# Patient Record
Sex: MALE | Race: WHITE | NOT HISPANIC OR LATINO | Employment: UNEMPLOYED | URBAN - METROPOLITAN AREA
[De-identification: names, ages, dates, MRNs, and addresses within clinical notes are randomized per-mention and may not be internally consistent; named-entity substitution may affect disease eponyms.]

---

## 2014-07-25 LAB — CRC RECOMMENDATION EXT: NORMAL

## 2017-01-17 ENCOUNTER — HISTORICAL (OUTPATIENT)
Dept: LAB | Facility: HOSPITAL | Age: 62
End: 2017-01-17

## 2017-01-18 ENCOUNTER — HISTORICAL (OUTPATIENT)
Dept: ADMINISTRATIVE | Facility: HOSPITAL | Age: 62
End: 2017-01-18

## 2017-01-24 ENCOUNTER — HISTORICAL (OUTPATIENT)
Dept: CARDIOLOGY | Facility: CLINIC | Age: 62
End: 2017-01-24

## 2017-01-30 ENCOUNTER — HISTORICAL (OUTPATIENT)
Dept: RADIOLOGY | Facility: HOSPITAL | Age: 62
End: 2017-01-30

## 2017-03-04 ENCOUNTER — HISTORICAL (OUTPATIENT)
Dept: SLEEP MEDICINE | Facility: HOSPITAL | Age: 62
End: 2017-03-04

## 2017-03-31 ENCOUNTER — HISTORICAL (OUTPATIENT)
Dept: SLEEP MEDICINE | Facility: HOSPITAL | Age: 62
End: 2017-03-31

## 2017-04-06 ENCOUNTER — HISTORICAL (OUTPATIENT)
Dept: ADMINISTRATIVE | Facility: HOSPITAL | Age: 62
End: 2017-04-06

## 2017-10-16 ENCOUNTER — HISTORICAL (OUTPATIENT)
Dept: CARDIOLOGY | Facility: CLINIC | Age: 62
End: 2017-10-16

## 2017-10-16 LAB
ABS NEUT (OLG): 3.31 X10(3)/MCL (ref 2.1–9.2)
ALBUMIN SERPL-MCNC: 3.4 GM/DL (ref 3.4–5)
ALBUMIN/GLOB SERPL: 1 RATIO (ref 1–2)
ALP SERPL-CCNC: 73 UNIT/L (ref 45–117)
ALT SERPL-CCNC: 36 UNIT/L (ref 12–78)
AST SERPL-CCNC: 21 UNIT/L (ref 15–37)
BASOPHILS # BLD AUTO: 0.04 X10(3)/MCL
BASOPHILS NFR BLD AUTO: 1 % (ref 0–1)
BILIRUB SERPL-MCNC: 0.7 MG/DL (ref 0.2–1)
BILIRUBIN DIRECT+TOT PNL SERPL-MCNC: 0.2 MG/DL
BILIRUBIN DIRECT+TOT PNL SERPL-MCNC: 0.5 MG/DL
BUN SERPL-MCNC: 25 MG/DL (ref 7–18)
CALCIUM SERPL-MCNC: 8.9 MG/DL (ref 8.5–10.1)
CHLORIDE SERPL-SCNC: 108 MMOL/L (ref 98–107)
CHOLEST SERPL-MCNC: 117 MG/DL
CHOLEST/HDLC SERPL: 4 {RATIO} (ref 0–5)
CO2 SERPL-SCNC: 30 MMOL/L (ref 21–32)
CREAT SERPL-MCNC: 1.2 MG/DL (ref 0.6–1.3)
EOSINOPHIL # BLD AUTO: 0.2 X10(3)/MCL
EOSINOPHIL NFR BLD AUTO: 3 % (ref 0–5)
ERYTHROCYTE [DISTWIDTH] IN BLOOD BY AUTOMATED COUNT: 12.9 % (ref 11.5–14.5)
GLOBULIN SER-MCNC: 3.5 GM/ML (ref 2.3–3.5)
GLUCOSE SERPL-MCNC: 105 MG/DL (ref 74–106)
HCT VFR BLD AUTO: 45.1 % (ref 40–51)
HDLC SERPL-MCNC: 29 MG/DL
HGB BLD-MCNC: 15.8 GM/DL (ref 13.5–17.5)
IMM GRANULOCYTES # BLD AUTO: 0.03 10*3/UL
IMM GRANULOCYTES NFR BLD AUTO: 0 %
LDLC SERPL CALC-MCNC: 43 MG/DL (ref 0–130)
LYMPHOCYTES # BLD AUTO: 2.03 X10(3)/MCL
LYMPHOCYTES NFR BLD AUTO: 32 % (ref 15–40)
MCH RBC QN AUTO: 31 PG (ref 26–34)
MCHC RBC AUTO-ENTMCNC: 35 GM/DL (ref 31–37)
MCV RBC AUTO: 88.4 FL (ref 80–100)
MONOCYTES # BLD AUTO: 0.71 X10(3)/MCL
MONOCYTES NFR BLD AUTO: 11 % (ref 4–12)
NEUTROPHILS # BLD AUTO: 3.31 X10(3)/MCL
NEUTROPHILS NFR BLD AUTO: 52 X10(3)/MCL
PLATELET # BLD AUTO: 271 X10(3)/MCL (ref 130–400)
PMV BLD AUTO: 9.6 FL (ref 7.4–10.4)
POTASSIUM SERPL-SCNC: 4.2 MMOL/L (ref 3.5–5.1)
PROT SERPL-MCNC: 6.9 GM/DL (ref 6.4–8.2)
RBC # BLD AUTO: 5.1 X10(6)/MCL (ref 4.5–5.9)
SODIUM SERPL-SCNC: 140 MMOL/L (ref 136–145)
T4 FREE SERPL-MCNC: 1.22 NG/DL (ref 0.76–1.46)
TRIGL SERPL-MCNC: 224 MG/DL
TSH SERPL-ACNC: 1.9 MIU/L (ref 0.36–3.74)
VLDLC SERPL CALC-MCNC: 45 MG/DL
WBC # SPEC AUTO: 6.3 X10(3)/MCL (ref 4.5–11)

## 2017-11-14 ENCOUNTER — HISTORICAL (OUTPATIENT)
Dept: ADMINISTRATIVE | Facility: HOSPITAL | Age: 62
End: 2017-11-14

## 2017-11-14 LAB
BUN SERPL-MCNC: 17 MG/DL (ref 7–18)
CALCIUM SERPL-MCNC: 9.7 MG/DL (ref 8.5–10.1)
CHLORIDE SERPL-SCNC: 104 MMOL/L (ref 98–107)
CO2 SERPL-SCNC: 34 MMOL/L (ref 21–32)
CREAT SERPL-MCNC: 1.1 MG/DL (ref 0.6–1.3)
ERYTHROCYTE [DISTWIDTH] IN BLOOD BY AUTOMATED COUNT: 12.7 % (ref 11.5–14.5)
GLUCOSE SERPL-MCNC: 79 MG/DL (ref 74–106)
HCT VFR BLD AUTO: 48.2 % (ref 40–51)
HGB BLD-MCNC: 16.9 GM/DL (ref 13.5–17.5)
MCH RBC QN AUTO: 31 PG (ref 26–34)
MCHC RBC AUTO-ENTMCNC: 35.1 GM/DL (ref 31–37)
MCV RBC AUTO: 88.3 FL (ref 80–100)
PLATELET # BLD AUTO: 322 X10(3)/MCL (ref 130–400)
PMV BLD AUTO: 9.7 FL (ref 7.4–10.4)
POTASSIUM SERPL-SCNC: 4.5 MMOL/L (ref 3.5–5.1)
RBC # BLD AUTO: 5.46 X10(6)/MCL (ref 4.5–5.9)
SODIUM SERPL-SCNC: 143 MMOL/L (ref 136–145)
WBC # SPEC AUTO: 7.4 X10(3)/MCL (ref 4.5–11)

## 2017-11-17 ENCOUNTER — HISTORICAL (OUTPATIENT)
Dept: SURGERY | Facility: HOSPITAL | Age: 62
End: 2017-11-17

## 2017-11-17 LAB — POTASSIUM SERPL-SCNC: 3.8 MMOL/L (ref 3.5–5.1)

## 2018-01-22 ENCOUNTER — HISTORICAL (OUTPATIENT)
Dept: ADMINISTRATIVE | Facility: HOSPITAL | Age: 63
End: 2018-01-22

## 2018-01-22 LAB
APPEARANCE, UA: CLEAR
BACTERIA #/AREA URNS AUTO: ABNORMAL /[HPF]
BILIRUB UR QL STRIP: NEGATIVE
COLOR UR: ABNORMAL
GLUCOSE (UA): NORMAL
HGB UR QL STRIP: NEGATIVE
HYALINE CASTS #/AREA URNS LPF: ABNORMAL /[LPF]
KETONES UR QL STRIP: NEGATIVE
LEUKOCYTE ESTERASE UR QL STRIP: NEGATIVE
NITRITE UR QL STRIP: NEGATIVE
PH UR STRIP: 5.5 [PH] (ref 4.5–8)
PROT UR QL STRIP: NEGATIVE
PSA SERPL-MCNC: 0.9 NG/ML
RBC #/AREA URNS AUTO: ABNORMAL /[HPF]
SP GR UR STRIP: 1 (ref 1–1.03)
SQUAMOUS #/AREA URNS LPF: <1 /[LPF]
UROBILINOGEN UR STRIP-ACNC: NORMAL
WBC #/AREA URNS AUTO: ABNORMAL /HPF

## 2018-01-24 LAB — FINAL CULTURE: NO GROWTH

## 2018-02-21 ENCOUNTER — HISTORICAL (OUTPATIENT)
Dept: SURGERY | Facility: HOSPITAL | Age: 63
End: 2018-02-21

## 2018-02-21 LAB
BUN SERPL-MCNC: 16 MG/DL (ref 7–18)
CALCIUM SERPL-MCNC: 8.7 MG/DL (ref 8.5–10.1)
CHLORIDE SERPL-SCNC: 105 MMOL/L (ref 98–107)
CO2 SERPL-SCNC: 30 MMOL/L (ref 21–32)
CREAT SERPL-MCNC: 1.2 MG/DL (ref 0.6–1.3)
ERYTHROCYTE [DISTWIDTH] IN BLOOD BY AUTOMATED COUNT: 12.9 % (ref 11.5–14.5)
GLUCOSE SERPL-MCNC: 109 MG/DL (ref 74–106)
HCT VFR BLD AUTO: 44.9 % (ref 40–51)
HGB BLD-MCNC: 15.8 GM/DL (ref 13.5–17.5)
MCH RBC QN AUTO: 31 PG (ref 26–34)
MCHC RBC AUTO-ENTMCNC: 35.2 GM/DL (ref 31–37)
MCV RBC AUTO: 88 FL (ref 80–100)
PLATELET # BLD AUTO: 303 X10(3)/MCL (ref 130–400)
PMV BLD AUTO: 9.2 FL (ref 7.4–10.4)
POTASSIUM SERPL-SCNC: 3.9 MMOL/L (ref 3.5–5.1)
RBC # BLD AUTO: 5.1 X10(6)/MCL (ref 4.5–5.9)
SODIUM SERPL-SCNC: 141 MMOL/L (ref 136–145)
WBC # SPEC AUTO: 7.6 X10(3)/MCL (ref 4.5–11)

## 2018-02-26 ENCOUNTER — HISTORICAL (OUTPATIENT)
Dept: SURGERY | Facility: HOSPITAL | Age: 63
End: 2018-02-26

## 2018-02-26 LAB
BUN SERPL-MCNC: 15 MG/DL (ref 7–18)
CALCIUM SERPL-MCNC: 9.4 MG/DL (ref 8.5–10.1)
CHLORIDE SERPL-SCNC: 105 MMOL/L (ref 98–107)
CO2 SERPL-SCNC: 29 MMOL/L (ref 21–32)
CREAT SERPL-MCNC: 1.1 MG/DL (ref 0.6–1.3)
ERYTHROCYTE [DISTWIDTH] IN BLOOD BY AUTOMATED COUNT: 13.1 % (ref 11.5–14.5)
GLUCOSE SERPL-MCNC: 96 MG/DL (ref 74–106)
HCT VFR BLD AUTO: 45.4 % (ref 40–51)
HGB BLD-MCNC: 15.9 GM/DL (ref 13.5–17.5)
MCH RBC QN AUTO: 30.6 PG (ref 26–34)
MCHC RBC AUTO-ENTMCNC: 35 GM/DL (ref 31–37)
MCV RBC AUTO: 87.3 FL (ref 80–100)
PLATELET # BLD AUTO: 314 X10(3)/MCL (ref 130–400)
PMV BLD AUTO: 9.2 FL (ref 7.4–10.4)
POTASSIUM SERPL-SCNC: 3.9 MMOL/L (ref 3.5–5.1)
RBC # BLD AUTO: 5.2 X10(6)/MCL (ref 4.5–5.9)
SODIUM SERPL-SCNC: 141 MMOL/L (ref 136–145)
WBC # SPEC AUTO: 8.1 X10(3)/MCL (ref 4.5–11)

## 2018-03-27 ENCOUNTER — HISTORICAL (OUTPATIENT)
Dept: ADMINISTRATIVE | Facility: HOSPITAL | Age: 63
End: 2018-03-27

## 2018-03-28 ENCOUNTER — HISTORICAL (OUTPATIENT)
Dept: INTERNAL MEDICINE | Facility: CLINIC | Age: 63
End: 2018-03-28

## 2018-03-28 LAB
CHOLEST SERPL-MCNC: 80 MG/DL
CHOLEST/HDLC SERPL: 3.2 {RATIO} (ref 0–5)
ERYTHROCYTE [DISTWIDTH] IN BLOOD BY AUTOMATED COUNT: 12.7 % (ref 11.5–14.5)
HCT VFR BLD AUTO: 45.2 % (ref 40–51)
HDLC SERPL-MCNC: 25 MG/DL
HGB BLD-MCNC: 15.7 GM/DL (ref 13.5–17.5)
LDLC SERPL CALC-MCNC: 13 MG/DL (ref 0–130)
MCH RBC QN AUTO: 31.2 PG (ref 26–34)
MCHC RBC AUTO-ENTMCNC: 34.7 GM/DL (ref 31–37)
MCV RBC AUTO: 89.7 FL (ref 80–100)
PLATELET # BLD AUTO: 334 X10(3)/MCL (ref 130–400)
PMV BLD AUTO: 9.5 FL (ref 7.4–10.4)
RBC # BLD AUTO: 5.04 X10(6)/MCL (ref 4.5–5.9)
TRIGL SERPL-MCNC: 209 MG/DL
VLDLC SERPL CALC-MCNC: 42 MG/DL
WBC # SPEC AUTO: 7.6 X10(3)/MCL (ref 4.5–11)

## 2018-04-03 ENCOUNTER — HISTORICAL (OUTPATIENT)
Dept: SURGERY | Facility: HOSPITAL | Age: 63
End: 2018-04-03

## 2018-04-03 LAB — POTASSIUM SERPL-SCNC: 3.6 MMOL/L (ref 3.5–5.1)

## 2018-05-24 ENCOUNTER — HISTORICAL (OUTPATIENT)
Dept: ADMINISTRATIVE | Facility: HOSPITAL | Age: 63
End: 2018-05-24

## 2018-05-24 LAB
BUN SERPL-MCNC: 22 MG/DL (ref 7–18)
CALCIUM SERPL-MCNC: 9.1 MG/DL (ref 8.5–10.1)
CHLORIDE SERPL-SCNC: 102 MMOL/L (ref 98–107)
CO2 SERPL-SCNC: 30 MMOL/L (ref 21–32)
CREAT SERPL-MCNC: 1.2 MG/DL (ref 0.6–1.3)
CREAT/UREA NIT SERPL: 18
ERYTHROCYTE [DISTWIDTH] IN BLOOD BY AUTOMATED COUNT: 12.2 % (ref 11.5–14.5)
GLUCOSE SERPL-MCNC: 67 MG/DL (ref 74–106)
HCT VFR BLD AUTO: 49.7 % (ref 40–51)
HGB BLD-MCNC: 16.8 GM/DL (ref 13.5–17.5)
MCH RBC QN AUTO: 30.3 PG (ref 26–34)
MCHC RBC AUTO-ENTMCNC: 33.8 GM/DL (ref 31–37)
MCV RBC AUTO: 89.7 FL (ref 80–100)
PLATELET # BLD AUTO: 281 X10(3)/MCL (ref 130–400)
PMV BLD AUTO: 9.6 FL (ref 7.4–10.4)
POTASSIUM SERPL-SCNC: 3.7 MMOL/L (ref 3.5–5.1)
RBC # BLD AUTO: 5.54 X10(6)/MCL (ref 4.5–5.9)
SODIUM SERPL-SCNC: 140 MMOL/L (ref 136–145)
WBC # SPEC AUTO: 6.2 X10(3)/MCL (ref 4.5–11)

## 2018-05-29 ENCOUNTER — HISTORICAL (OUTPATIENT)
Dept: SURGERY | Facility: HOSPITAL | Age: 63
End: 2018-05-29

## 2018-05-29 LAB — POTASSIUM SERPL-SCNC: 4.5 MMOL/L (ref 3.5–5.1)

## 2018-07-27 ENCOUNTER — HISTORICAL (OUTPATIENT)
Dept: CARDIOLOGY | Facility: CLINIC | Age: 63
End: 2018-07-27

## 2018-07-27 LAB
ABS NEUT (OLG): 3.76 X10(3)/MCL (ref 2.1–9.2)
ALBUMIN SERPL-MCNC: 3.6 GM/DL (ref 3.4–5)
ALBUMIN/GLOB SERPL: 1 RATIO (ref 1–2)
ALP SERPL-CCNC: 81 UNIT/L (ref 45–117)
ALT SERPL-CCNC: 30 UNIT/L (ref 12–78)
AST SERPL-CCNC: 21 UNIT/L (ref 15–37)
BASOPHILS # BLD AUTO: 0.06 X10(3)/MCL
BASOPHILS NFR BLD AUTO: 1 %
BILIRUB SERPL-MCNC: 0.8 MG/DL (ref 0.2–1)
BILIRUBIN DIRECT+TOT PNL SERPL-MCNC: 0.2 MG/DL
BILIRUBIN DIRECT+TOT PNL SERPL-MCNC: 0.6 MG/DL
BUN SERPL-MCNC: 16 MG/DL (ref 7–18)
CALCIUM SERPL-MCNC: 9 MG/DL (ref 8.5–10.1)
CHLORIDE SERPL-SCNC: 102 MMOL/L (ref 98–107)
CHOLEST SERPL-MCNC: 86 MG/DL
CHOLEST/HDLC SERPL: 3.1 {RATIO} (ref 0–5)
CO2 SERPL-SCNC: 32 MMOL/L (ref 21–32)
CREAT SERPL-MCNC: 1.4 MG/DL (ref 0.6–1.3)
EOSINOPHIL # BLD AUTO: 0.28 X10(3)/MCL
EOSINOPHIL NFR BLD AUTO: 4 %
ERYTHROCYTE [DISTWIDTH] IN BLOOD BY AUTOMATED COUNT: 13.2 % (ref 11.5–14.5)
GLOBULIN SER-MCNC: 3.8 GM/ML (ref 2.3–3.5)
GLUCOSE SERPL-MCNC: 107 MG/DL (ref 74–106)
HCT VFR BLD AUTO: 48.4 % (ref 40–51)
HDLC SERPL-MCNC: 28 MG/DL
HGB BLD-MCNC: 16.7 GM/DL (ref 13.5–17.5)
IMM GRANULOCYTES # BLD AUTO: 0.01 10*3/UL
IMM GRANULOCYTES NFR BLD AUTO: 0 %
LDLC SERPL CALC-MCNC: 29 MG/DL (ref 0–130)
LYMPHOCYTES # BLD AUTO: 2.12 X10(3)/MCL
LYMPHOCYTES NFR BLD AUTO: 31 % (ref 13–40)
MCH RBC QN AUTO: 30.3 PG (ref 26–34)
MCHC RBC AUTO-ENTMCNC: 34.5 GM/DL (ref 31–37)
MCV RBC AUTO: 87.7 FL (ref 80–100)
MONOCYTES # BLD AUTO: 0.64 X10(3)/MCL
MONOCYTES NFR BLD AUTO: 9 % (ref 4–12)
NEUTROPHILS # BLD AUTO: 3.76 X10(3)/MCL
NEUTROPHILS NFR BLD AUTO: 55 X10(3)/MCL
PLATELET # BLD AUTO: 303 X10(3)/MCL (ref 130–400)
PMV BLD AUTO: 9.1 FL (ref 7.4–10.4)
POTASSIUM SERPL-SCNC: 4.3 MMOL/L (ref 3.5–5.1)
PROT SERPL-MCNC: 7.4 GM/DL (ref 6.4–8.2)
RBC # BLD AUTO: 5.52 X10(6)/MCL (ref 4.5–5.9)
SODIUM SERPL-SCNC: 138 MMOL/L (ref 136–145)
TRIGL SERPL-MCNC: 146 MG/DL
VLDLC SERPL CALC-MCNC: 29 MG/DL
WBC # SPEC AUTO: 6.9 X10(3)/MCL (ref 4.5–11)

## 2018-09-26 ENCOUNTER — HISTORICAL (OUTPATIENT)
Dept: CARDIOLOGY | Facility: CLINIC | Age: 63
End: 2018-09-26

## 2018-09-26 LAB
ALBUMIN SERPL-MCNC: 3.3 GM/DL (ref 3.4–5)
ALBUMIN/GLOB SERPL: 1 RATIO (ref 1–2)
ALP SERPL-CCNC: 82 UNIT/L (ref 45–117)
ALT SERPL-CCNC: 28 UNIT/L (ref 12–78)
AST SERPL-CCNC: 21 UNIT/L (ref 15–37)
BILIRUB SERPL-MCNC: 0.5 MG/DL (ref 0.2–1)
BILIRUBIN DIRECT+TOT PNL SERPL-MCNC: 0.1 MG/DL
BILIRUBIN DIRECT+TOT PNL SERPL-MCNC: 0.4 MG/DL
BUN SERPL-MCNC: 14 MG/DL (ref 7–18)
CALCIUM SERPL-MCNC: 8.6 MG/DL (ref 8.5–10.1)
CHLORIDE SERPL-SCNC: 101 MMOL/L (ref 98–107)
CHOLEST SERPL-MCNC: 89 MG/DL
CHOLEST/HDLC SERPL: 3.2 {RATIO} (ref 0–5)
CO2 SERPL-SCNC: 32 MMOL/L (ref 21–32)
CREAT SERPL-MCNC: 1.2 MG/DL (ref 0.6–1.3)
GLOBULIN SER-MCNC: 3.9 GM/ML (ref 2.3–3.5)
GLUCOSE SERPL-MCNC: 102 MG/DL (ref 74–106)
HDLC SERPL-MCNC: 28 MG/DL
LDLC SERPL CALC-MCNC: 8 MG/DL (ref 0–130)
POTASSIUM SERPL-SCNC: 4 MMOL/L (ref 3.5–5.1)
PROT SERPL-MCNC: 7.2 GM/DL (ref 6.4–8.2)
SODIUM SERPL-SCNC: 137 MMOL/L (ref 136–145)
TRIGL SERPL-MCNC: 267 MG/DL
VLDLC SERPL CALC-MCNC: 53 MG/DL

## 2019-02-06 ENCOUNTER — HISTORICAL (OUTPATIENT)
Dept: ADMINISTRATIVE | Facility: HOSPITAL | Age: 64
End: 2019-02-06

## 2019-02-06 LAB
BUN SERPL-MCNC: 19 MG/DL (ref 7–18)
CALCIUM SERPL-MCNC: 9.2 MG/DL (ref 8.5–10.1)
CHLORIDE SERPL-SCNC: 104 MMOL/L (ref 98–107)
CO2 SERPL-SCNC: 29 MMOL/L (ref 21–32)
CREAT SERPL-MCNC: 1 MG/DL (ref 0.6–1.3)
CREAT/UREA NIT SERPL: 19
GLUCOSE SERPL-MCNC: 94 MG/DL (ref 74–106)
POTASSIUM SERPL-SCNC: 4.4 MMOL/L (ref 3.5–5.1)
SODIUM SERPL-SCNC: 138 MMOL/L (ref 136–145)

## 2019-02-21 ENCOUNTER — HISTORICAL (OUTPATIENT)
Dept: RADIOLOGY | Facility: HOSPITAL | Age: 64
End: 2019-02-21

## 2019-03-25 ENCOUNTER — HISTORICAL (OUTPATIENT)
Dept: CARDIOLOGY | Facility: CLINIC | Age: 64
End: 2019-03-25

## 2019-03-25 LAB
ALBUMIN SERPL-MCNC: 3.7 GM/DL (ref 3.4–5)
ALBUMIN/GLOB SERPL: 1 RATIO (ref 1.1–2)
ALP SERPL-CCNC: 82 UNIT/L (ref 45–117)
ALT SERPL-CCNC: 43 UNIT/L (ref 12–78)
AST SERPL-CCNC: 42 UNIT/L (ref 15–37)
BILIRUB SERPL-MCNC: 0.6 MG/DL (ref 0.2–1)
BILIRUBIN DIRECT+TOT PNL SERPL-MCNC: 0.2 MG/DL
BILIRUBIN DIRECT+TOT PNL SERPL-MCNC: 0.4 MG/DL
BUN SERPL-MCNC: 14 MG/DL (ref 7–18)
CALCIUM SERPL-MCNC: 9 MG/DL (ref 8.5–10.1)
CHLORIDE SERPL-SCNC: 110 MMOL/L (ref 98–107)
CHOLEST SERPL-MCNC: 92 MG/DL
CHOLEST/HDLC SERPL: 3.1 {RATIO} (ref 0–5)
CO2 SERPL-SCNC: 27 MMOL/L (ref 21–32)
CREAT SERPL-MCNC: 1.1 MG/DL (ref 0.6–1.3)
GLOBULIN SER-MCNC: 3.7 GM/ML (ref 2.3–3.5)
GLUCOSE SERPL-MCNC: 111 MG/DL (ref 74–106)
HDLC SERPL-MCNC: 30 MG/DL
LDLC SERPL CALC-MCNC: 29 MG/DL (ref 0–130)
POTASSIUM SERPL-SCNC: 4.1 MMOL/L (ref 3.5–5.1)
PROT SERPL-MCNC: 7.4 GM/DL (ref 6.4–8.2)
SODIUM SERPL-SCNC: 142 MMOL/L (ref 136–145)
TRIGL SERPL-MCNC: 163 MG/DL
VLDLC SERPL CALC-MCNC: 33 MG/DL

## 2019-08-05 ENCOUNTER — HISTORICAL (OUTPATIENT)
Dept: ADMINISTRATIVE | Facility: HOSPITAL | Age: 64
End: 2019-08-05

## 2019-08-05 LAB
ABS NEUT (OLG): 3.6 X10(3)/MCL (ref 2.1–9.2)
ALBUMIN SERPL-MCNC: 3.4 GM/DL (ref 3.4–5)
ALBUMIN/GLOB SERPL: 0.9 RATIO (ref 1.1–2)
ALP SERPL-CCNC: 87 UNIT/L (ref 45–117)
ALT SERPL-CCNC: 32 UNIT/L (ref 12–78)
AST SERPL-CCNC: 27 UNIT/L (ref 15–37)
BASOPHILS # BLD AUTO: 0.05 X10(3)/MCL
BASOPHILS NFR BLD AUTO: 1 %
BILIRUB SERPL-MCNC: 0.9 MG/DL (ref 0.2–1)
BILIRUBIN DIRECT+TOT PNL SERPL-MCNC: 0.2 MG/DL
BILIRUBIN DIRECT+TOT PNL SERPL-MCNC: 0.7 MG/DL
BUN SERPL-MCNC: 17 MG/DL (ref 7–18)
CALCIUM SERPL-MCNC: 8.6 MG/DL (ref 8.5–10.1)
CHLORIDE SERPL-SCNC: 108 MMOL/L (ref 98–107)
CHOLEST SERPL-MCNC: 91 MG/DL
CHOLEST/HDLC SERPL: 3 {RATIO} (ref 0–5)
CO2 SERPL-SCNC: 27 MMOL/L (ref 21–32)
CREAT SERPL-MCNC: 1.3 MG/DL (ref 0.6–1.3)
EOSINOPHIL # BLD AUTO: 0.32 X10(3)/MCL
EOSINOPHIL NFR BLD AUTO: 4 %
ERYTHROCYTE [DISTWIDTH] IN BLOOD BY AUTOMATED COUNT: 12.7 % (ref 11.5–14.5)
GLOBULIN SER-MCNC: 3.7 GM/ML (ref 2.3–3.5)
GLUCOSE SERPL-MCNC: 102 MG/DL (ref 74–106)
HCT VFR BLD AUTO: 47.1 % (ref 40–51)
HDLC SERPL-MCNC: 30 MG/DL
HGB BLD-MCNC: 16 GM/DL (ref 13.5–17.5)
IMM GRANULOCYTES # BLD AUTO: 0.01 10*3/UL
IMM GRANULOCYTES NFR BLD AUTO: 0 %
LDLC SERPL CALC-MCNC: 19 MG/DL (ref 0–130)
LYMPHOCYTES # BLD AUTO: 2.7 X10(3)/MCL
LYMPHOCYTES NFR BLD AUTO: 35 % (ref 13–40)
MCH RBC QN AUTO: 30.1 PG (ref 26–34)
MCHC RBC AUTO-ENTMCNC: 34 GM/DL (ref 31–37)
MCV RBC AUTO: 88.7 FL (ref 80–100)
MONOCYTES # BLD AUTO: 1.05 X10(3)/MCL
MONOCYTES NFR BLD AUTO: 14 % (ref 0–24)
NEUTROPHILS # BLD AUTO: 3.6 X10(3)/MCL
NEUTROPHILS NFR BLD AUTO: 47 X10(3)/MCL
PLATELET # BLD AUTO: 280 X10(3)/MCL (ref 130–400)
PMV BLD AUTO: 8.9 FL (ref 7.4–10.4)
POTASSIUM SERPL-SCNC: 3.9 MMOL/L (ref 3.5–5.1)
PROT SERPL-MCNC: 7.1 GM/DL (ref 6.4–8.2)
RBC # BLD AUTO: 5.31 X10(6)/MCL (ref 4.5–5.9)
SODIUM SERPL-SCNC: 139 MMOL/L (ref 136–145)
TRIGL SERPL-MCNC: 208 MG/DL
VLDLC SERPL CALC-MCNC: 42 MG/DL
WBC # SPEC AUTO: 7.7 X10(3)/MCL (ref 4.5–11)

## 2020-10-27 ENCOUNTER — HISTORICAL (OUTPATIENT)
Dept: ADMINISTRATIVE | Facility: HOSPITAL | Age: 65
End: 2020-10-27

## 2020-10-27 LAB
BUN SERPL-MCNC: 12 MG/DL (ref 8.4–25.7)
CALCIUM SERPL-MCNC: 9.5 MG/DL (ref 8.8–10)
CHLORIDE SERPL-SCNC: 104 MMOL/L (ref 98–107)
CO2 SERPL-SCNC: 30 MMOL/L (ref 23–31)
CREAT SERPL-MCNC: 0.99 MG/DL (ref 0.73–1.18)
CREAT/UREA NIT SERPL: 12
GLUCOSE SERPL-MCNC: 84 MG/DL (ref 82–115)
POTASSIUM SERPL-SCNC: 3.9 MMOL/L (ref 3.5–5.1)
SODIUM SERPL-SCNC: 138 MMOL/L (ref 136–145)

## 2020-10-29 ENCOUNTER — HISTORICAL (OUTPATIENT)
Dept: SURGERY | Facility: HOSPITAL | Age: 65
End: 2020-10-29

## 2021-02-25 ENCOUNTER — HISTORICAL (OUTPATIENT)
Dept: RADIOLOGY | Facility: HOSPITAL | Age: 66
End: 2021-02-25

## 2021-03-02 ENCOUNTER — HISTORICAL (OUTPATIENT)
Dept: ADMINISTRATIVE | Facility: HOSPITAL | Age: 66
End: 2021-03-02

## 2021-03-02 LAB
ABS NEUT (OLG): 3.22 X10(3)/MCL (ref 2.1–9.2)
APTT PPP: 28.9 SECOND(S) (ref 23.3–37)
BASOPHILS # BLD AUTO: 0 X10(3)/MCL (ref 0–0.2)
BASOPHILS NFR BLD AUTO: 0 %
BUN SERPL-MCNC: 9.3 MG/DL (ref 8.4–25.7)
CALCIUM SERPL-MCNC: 9.2 MG/DL (ref 8.8–10)
CHLORIDE SERPL-SCNC: 103 MMOL/L (ref 98–107)
CO2 SERPL-SCNC: 28 MMOL/L (ref 23–31)
CREAT SERPL-MCNC: 0.88 MG/DL (ref 0.73–1.18)
CREAT/UREA NIT SERPL: 11
EOSINOPHIL # BLD AUTO: 0.2 X10(3)/MCL (ref 0–0.9)
EOSINOPHIL NFR BLD AUTO: 4 %
ERYTHROCYTE [DISTWIDTH] IN BLOOD BY AUTOMATED COUNT: 13.9 % (ref 11.5–14.5)
GLUCOSE SERPL-MCNC: 144 MG/DL (ref 82–115)
HCT VFR BLD AUTO: 45.3 % (ref 40–51)
HGB BLD-MCNC: 15.1 GM/DL (ref 13.5–17.5)
IMM GRANULOCYTES # BLD AUTO: 0.02 10*3/UL
IMM GRANULOCYTES NFR BLD AUTO: 0 %
INR PPP: 1.03 (ref 0.9–1.2)
LYMPHOCYTES # BLD AUTO: 2.7 X10(3)/MCL (ref 0.6–4.6)
LYMPHOCYTES NFR BLD AUTO: 38 %
MCH RBC QN AUTO: 30.7 PG (ref 26–34)
MCHC RBC AUTO-ENTMCNC: 33.3 GM/DL (ref 31–37)
MCV RBC AUTO: 92.1 FL (ref 80–100)
MONOCYTES # BLD AUTO: 0.8 X10(3)/MCL (ref 0.1–1.3)
MONOCYTES NFR BLD AUTO: 11 %
NEUTROPHILS # BLD AUTO: 3.22 X10(3)/MCL (ref 2.1–9.2)
NEUTROPHILS NFR BLD AUTO: 46 %
PLATELET # BLD AUTO: 285 X10(3)/MCL (ref 130–400)
PMV BLD AUTO: 9.5 FL (ref 7.4–10.4)
POTASSIUM SERPL-SCNC: 4.3 MMOL/L (ref 3.5–5.1)
PROTHROMBIN TIME: 13.1 SECOND(S) (ref 11.9–14.4)
RBC # BLD AUTO: 4.92 X10(6)/MCL (ref 4.5–5.9)
SODIUM SERPL-SCNC: 141 MMOL/L (ref 136–145)
WBC # SPEC AUTO: 6.9 X10(3)/MCL (ref 4.5–11)

## 2021-03-15 ENCOUNTER — HISTORICAL (OUTPATIENT)
Dept: CARDIOLOGY | Facility: HOSPITAL | Age: 66
End: 2021-03-15

## 2021-03-15 LAB
ALBUMIN SERPL-MCNC: 3.6 GM/DL (ref 3.4–4.8)
ALBUMIN/GLOB SERPL: 1.2 RATIO (ref 1.1–2)
ALP SERPL-CCNC: 63 UNIT/L (ref 40–150)
ALT SERPL-CCNC: 33 UNIT/L (ref 0–55)
AST SERPL-CCNC: 31 UNIT/L (ref 5–34)
BILIRUB SERPL-MCNC: 0.6 MG/DL
BILIRUBIN DIRECT+TOT PNL SERPL-MCNC: 0.3 MG/DL (ref 0–0.5)
BILIRUBIN DIRECT+TOT PNL SERPL-MCNC: 0.3 MG/DL (ref 0–0.8)
BUN SERPL-MCNC: 12.6 MG/DL (ref 8.4–25.7)
CALCIUM SERPL-MCNC: 8.8 MG/DL (ref 8.8–10)
CHLORIDE SERPL-SCNC: 104 MMOL/L (ref 98–107)
CHOLEST SERPL-MCNC: 92 MG/DL
CHOLEST/HDLC SERPL: 4 {RATIO} (ref 0–5)
CO2 SERPL-SCNC: 30 MMOL/L (ref 23–31)
CREAT SERPL-MCNC: 0.89 MG/DL (ref 0.73–1.18)
EST CREAT CLEARANCE SER (OHS): 82.34 ML/MIN
GLOBULIN SER-MCNC: 3.1 GM/DL (ref 2.4–3.5)
GLUCOSE SERPL-MCNC: 109 MG/DL (ref 82–115)
HDLC SERPL-MCNC: 26 MG/DL (ref 35–60)
LDLC SERPL CALC-MCNC: 38 MG/DL (ref 50–140)
POTASSIUM SERPL-SCNC: 4.4 MMOL/L (ref 3.5–5.1)
PROT SERPL-MCNC: 6.7 GM/DL (ref 5.8–7.6)
SODIUM SERPL-SCNC: 141 MMOL/L (ref 136–145)
TRIGL SERPL-MCNC: 140 MG/DL (ref 34–140)
VLDLC SERPL CALC-MCNC: 28 MG/DL

## 2021-04-14 ENCOUNTER — HISTORICAL (OUTPATIENT)
Dept: RADIOLOGY | Facility: HOSPITAL | Age: 66
End: 2021-04-14

## 2021-07-14 ENCOUNTER — HISTORICAL (OUTPATIENT)
Dept: RESPIRATORY THERAPY | Facility: HOSPITAL | Age: 66
End: 2021-07-14

## 2022-02-28 ENCOUNTER — HISTORICAL (OUTPATIENT)
Dept: OPHTHALMOLOGY | Facility: CLINIC | Age: 67
End: 2022-02-28

## 2022-02-28 LAB
CHOLEST SERPL-MCNC: 112 MG/DL
CHOLEST/HDLC SERPL: 3 {RATIO} (ref 0–5)
HDLC SERPL-MCNC: 36 MG/DL (ref 35–60)
LDLC SERPL CALC-MCNC: 40 MG/DL (ref 50–140)
TRIGL SERPL-MCNC: 180 MG/DL (ref 34–140)
VLDLC SERPL CALC-MCNC: 36 MG/DL

## 2022-04-10 ENCOUNTER — HISTORICAL (OUTPATIENT)
Dept: ADMINISTRATIVE | Facility: HOSPITAL | Age: 67
End: 2022-04-10

## 2022-04-25 VITALS
SYSTOLIC BLOOD PRESSURE: 136 MMHG | BODY MASS INDEX: 36.85 KG/M2 | DIASTOLIC BLOOD PRESSURE: 75 MMHG | OXYGEN SATURATION: 97 % | WEIGHT: 263.25 LBS | HEIGHT: 71 IN

## 2022-04-30 NOTE — OP NOTE
* Final Report *  Document Contains Addenda  Operative Report OS     Patient:   Doug Faye             MRN: [id number]            FIN: [id number]               Age:   62 years     Sex:  Male     :  1955   Associated Diagnoses:   None   Author:   Olaf Decker MD      DATE OF SURGERY:19    PRIMARY SURGEON: Olaf Decker MD    STAFF SURGEON:  Jcarlos Siddiqui MD    PREOPERATIVE DIAGNOSIS: Occult Bleb Leak, left eye    POSTOPERATIVE DIAGNOSIS:  Same status post procedure.    OPERATION: Bleb Revision OS    Anesthesia:  General IV.  Complications:  None.  Estimated blood loss:  Less than 1 cc.    INDICATION FOR PROCEDURE:  The patient was evaluated in clinic and noted to have decreased intraocular pressure with hypotony maculopathy.  Options were discussed with the patient and family and patient elected for surgical intervention.  Risks, benefits and alternatives were explained.  Informed consent was obtained from the patient in clinic.    PROCEDURE IN DETAIL:  The patient was brought into the OR and laid in supine position.  General IV anesthesia was undertaken without complication.  The lids and the eye were prepped in usual manner for intraocular surgery.  A wire lid speculum was placed in the operative eye for adequate exposure to the surgical site.      The superior limbal border of the conjunctival bleb was noted to be pallorous and thin. A paracentesis was made temporally and Healon injected into the anterior chamber to deepen and stabilize the anterior chamber. Blunt wescotts with 0.12 foreceps were then used to do a limited peritomy superiorly. The flap was inspected and noted to have two loose temporal nylon sutures. The two sutures were incised and removed, and two replacement tight 10-0 nylon sutures were placed in simple interrupted fashion. Next the conjunctiva was anchored at the two peritomy borders with 8-0 vicryl simple intterupted sutures, one nasally and one temporally,  with good conjunctival approximation. Next, two 8-0 vicryl sutures placed in horizontal mattress fashion were placed at the limbus to reapproximate conjunctiva and limbs.  The IOP was judged to be physiologic.  The wounds were watertight. Vigamox and pred forte gtts placed into eye, and pressure patch and shield placed.  The patient left the operating room in stable condition, having tolerated the surgery well.        Addendum by Jcarlos Siddiqui MD on February 21, 2018 14:26 CST (Verified)  I was scrubbed in and performed parts of the procedure myself.  CPT 71834    Result type: Ophthalmology Office/Clinic Note  Result date: February 21, 2018 14:11 CST  Result status: Modified  Result title: Operative Report OS  Performed by: Olaf Decker MD on February 21, 2018 14:19 CST  Verified by: Olaf Decker MD on February 21, 2018 14:19 CST  Encounter info: [id number], [facility], Day Surgery, 2/21/2018 - 2/21/2018    Doc has been moved by HIM specialist to the correct doc type.

## 2022-04-30 NOTE — H&P
* Final Report *  Document Contains Addenda  Pre OP H&P     Patient:   Doug Faye             MRN: [id number]            FIN: [id number]               Age:   62 years     Sex:  Male     :  1955   Associated Diagnoses:   None   Author:   Olaf Decker MD      HPI: Patient presents with low IOP in the left eye    ROS:   Constitutional negative  Eye see HPI  ENMT negative  Respiratory negative  Cardiovascular negative  Gastrointestinal negative  Genitourinary negative  Hema/Lymph negative  Endocrine negative  Immunologic negative  Musculoskeletal negative  Integumentary negative  Neurologic negative  All Other ROS negative     PE:  General NAD  Eye per HP  Neck normal ROM  Respiratory normal RR  Cardiovascular Dual HR, normal rate and rhythm  Gastrointestinal no tenderness to palpation  Musculoskeletal normal ROM  Neurologic no focal neuro deficits    A/P:  Hypotony Maculopathy  - POM#3 S/P Trab OS (2017)  - Patient needs bleb revision due to excessive filtration causing hyoptony maculopathy  - Plan: to OR today 18 for bleb revision OS  - discussed risks of vision loss and no improvement of vision with patient. Wishes to proceed.          Addendum by Jcarlos Siddiqui MD on 2018 13:45 CST (Verified)  patient for trab revision OS today    Result type: Ophthalmology Office/Clinic Note  Result date: 2018 12:21 CST  Result status: Modified  Result title: Pre OP H&P  Performed by: Olaf Decker MD on 2018 12:22 CST  Verified by: Olaf Decker MD on 2018 12:22 CST  Encounter info: [id number], [facility], Day Surgery, 2018 - 2018    Doc has been moved by HIM specialist to the correct doc type.

## 2022-04-30 NOTE — PROGRESS NOTES
Patient:   Doug Faye             MRN: [id number]            FIN: [id number]               Age:   64 years     Sex:  Male     :  1955   Associated Diagnoses:   None   Author:   Jcarlos Siddiqui MD      Surgeon: Artur  Assistant: Or   Date: 10/29/2020  Preop dx: 1. visually significant cataract 2. COAG OS  Postop dx: same  Procedure: 1. CE c IOL 2. Ahmed valve (FP-8 inferiorly) OS  Anesthesia: LMA  EBL: none  Complications: none  Detail:    After informed consent was obtained, all risks and benefits were explained in full detail and the patient was  taken to the operative suite and placed in supine position on the operative table.  The eye was prepped and  draped and the speculum placed.    A paracentesis was created on the left and right hand side.  Lidocaine 1% MPF was injected into the anterior chamber.  Amvisc was instilled into the anterior chamber and the 2.5 mm keratome was used to create a temporal incision.  The cystotome needle was used to create a capsulorrhexis and it was completed using the utrata forceps.  BSS  was then used to hydrodissect and hydrodilineate the nucleus.  Once the nucleus was mobile it was removed   using the phacoemulsification handpiece.  The cortex was then removed with the I/A handpiece.  Amvisc was   re-instilled into the anterior chamber and the B&L envista IOL was injected into the capuslar bag and   dialed into place.  A 10-0 nylon suture was placed at the wound and the knot buried.    At this point, a 6-0 vicryl traction suture was placed inferiorly and the eye supraducted.  A peritomy was carried out   over the infero/temporal conjunctiva.  Cautery was maintained with bipolar.  A scleral flap was fashioned and the  valve was primed and placed into position.  The butterfly needle was placed under the flap and the tube advanced  into the anterior chamber.  The scleral flap was closed over the tube with two 10-0 nylon sutures and the tube was  secured to the  sclera with another 10-0 nylon suture.  The conjunctiva was then closed using interrupted 8-0 vicryl  sutures.      There were no complications and the patient tolerated the procedure well.  The speculum was removed and a   patch and shield were applied.  The patient was then taken to the recovery room in stable condition.    I performed the entire procedure personally.    CPT: 77112, 12010

## 2022-04-30 NOTE — H&P
Patient:   Doug Faye             MRN: [id number]            FIN: [id number]               Age:   62 years     Sex:  Male     :  1955   Associated Diagnoses:   None   Author:   Octavio Gill MD      HPI: PT here for revision of Trabeculectomy OS. Denies changes in health or medications since the patient was last seen in clinic.     ROS: Negative x 10   Gen: AxOx3  CV: RRR  Lungs: CTAB  GI: Soft, normal bowel sounds  Skin: Warm and dry    VAsc: OS CF  Ta: 3 (last clinic note)    SLE  L/L: MGD OU, ptosis left upper lid  C/S: OS trace injection, sutures superiorly, leaking bleb superiorly, nylon suture intact  K: PEEs OU  AC: OS formed, no IK touch centrally, quiet  I: no NVI   L: 2+NSC with 1-2+ CC changes    See previous notes for prior glaucoma testing    Assessment/Plan  1. s/p trab revision OS with scleral patch graft 4/3/18  - Due to persistent bleb leak with resultant hypotony with maculopathy  - s/p trab (17), trab revision x3 (18, 18, 4/3/18)  - s/p AC re-inflation with Healon at slit lamp 18  - S/p Low temp cautery to site at slit lamp with placement of 6-0 nylon suture at slit lamp with pressure patch 2 d ago  - No B/L/S, no eye rubbing, eye shield while asleep  - Will need refraction when vision/pressure stable  - Plan to OR for Trabeculectomy Revision today, 18.  RBA discussed.  Consent reviewed.        RTC POD #1

## 2022-04-30 NOTE — OP NOTE
Patient:   Doug Faye             MRN: [id number]            FIN: [id number]               Age:   62 years     Sex:  Male     :  1955   Associated Diagnoses:   None   Author:   Lisa Curtis MD      SURGEON: Lisa Curtis MD    ASSISTANT: Jcarols Siddiqui MD    ATTENDING: Jcarlos Siddiqui MD    DATE OF SURGERY: 4/3/18    PROCEDURES: Trabeculectomy revision with scleral patch graft, left eye    IMPLANT: Scleral patch graft    ANESTHESIA: MAC with topical tetracaine, subconjunctival lidocaine with epinephrine, and retrobulbar block    COMPLICATIONS: None    ESTIMATED BLOOD LOSS: None    INDICATIONS:  The patient has a history of painless progressive visual loss secondary to hypotony follow trabeculectomy for glaucoma. After discussion of the risks, benefits and alternatives to cataract surgery, including, but not limited to, the risk of infection, retinal detachment, need for additional surgery, loss of vision and loss of the eye, the patient voiced good understanding and wished to proceed. Informed consent was obtained.    DESCRIPTION OF PROCEDURE:  The patient was brought to the operating room and placed in supine position. After adequate anestheia was achieved, a retrobulbar block with bupivacaine 0.75% and lidocaine without ephinephrine 2% was administered. The eye was prepped and draped in sterile fashion using 5% Betadine. A sterile lid speculum placed in the palpebral fissure. A 6-0 Vicryl traction suture was placed through the superior cornea, and the eye was infraducted and the suture tails clamped to the drape. A paracentesis incision was created, through which viscoelastic was used to fill the anterior chamber. Alphonse scissors were used to release the bleb at the superior limbus. Hemostasis was maintained with bipolar electrocautery. 10-0 nylon suture in a figure 8 configuration was used to secure the scleral patch graft over the existing scleral window.. The conjunctiva was closed  securely using simple interrupted 8-0 Vicryl sutures. The traction suture was cut arnd removed. The lid speculum was removed under direct visualization. Drops of Vigamox and Pred-Forte were instilled, and an eye patch and shield were placed over the eye. The patient tolerated the procedure well without complications. He was taken to the recovery room in stable condition and instructed to follow up in the eye clinic the following morning.

## 2022-04-30 NOTE — H&P
* Final Report *  Document Contains Addenda  Ophthalmic Examination Visit *     Patient:   Doug Faye             MRN: [id number]            FIN: [id number]               Age:   62 years     Sex:  Male     :  1955   Associated Diagnoses:   None   Author:   Devaughn Whaley MD      Chief Complaint   CC: follow-up      History of Present Illness   Patient here for follow-up examination.      Review of Systems   Constitutional:  No fever, No chills, No sweats.    Eye:  No icterus, No blurring, No double vision.    Respiratory:  No shortness of breath, No cough, No sputum production.    Cardiovascular:  No chest pain, No palpitations, No syncope.    Gastrointestinal:  No nausea, No vomiting, No abdominal pain.    Genitourinary:  No dysuria, No hematuria, No urethral discharge.    Immunologic:  Not immunocompromised, No recurrent fevers, No malaise.    Integumentary:  No rash, No pruritus, No breakdown.    Neurologic:  Alert and oriented X4.    Psychiatric:  No anxiety, No depression, No hallucinations.    ROS reviewed as documented in chart      Health Status   Allergies:    Allergic Reactions (All)  Severity Not Documented  Penicillins- Passed out.  Canceled/Inactive Reactions (All)  No Known Medication Allergies,    Allergies (1) Active Reaction  penicillins Passed out     Current medications:  (Selected)   Outpatient Medications  Ordered  Versed: 5 mg, form: Soln, IV Slow, Titrate, first dose 14 10:09:00 CDT, Titrated slowly as instructed by procedure MD, to provide conscious sedation., 1,023,846  meperidine: 50 mg, form: Injection, IV Slow, Titrate, first dose 14 10:09:00 CDT, titrated slowly as instructed by procedure MD, to provide conscious sedation., 1,023,846  mitomycin 0.4 mg/mL Ophth. Soln. (0.04%): 5 mg, form: Inj-Ophth, Intraocular, Once, first dose 17 6:00:00 CST, stop date 17 6:00:00 CST, 1,023,851  Prescriptions  Prescribed  Coreg 12.5 mg oral tablet: 12.5 mg = 1  tab(s), Oral, BID, # 60 tab(s), 6 Refill(s), Pharmacy: [id number]  Pred Forte 1% ophthalmic suspension: 1 drop(s), Eye-Left, QID, X 30 day(s), # 10 mL, 1 Refill(s), Pharmacy: [facility] [id number]  amlodipine 5 mg oral tablet: 5 mg = 1 tab(s), Oral, Daily, # 30 tab(s), 6 Refill(s), Pharmacy: [facility] [address]  aspirin 325 mg oral tablet: 325 mg = 1 tab(s), Oral, Daily, # 30 tab(s), 6 Refill(s), other reason (Rx)  atorvastatin 20 mg oral tablet: 20 mg = 1 tab(s), Oral, Daily, # 30 tab(s), 6 Refill(s), Pharmacy: [facility] [address]  brimonidine ophthalmic 0.15% solution: 1 drop(s), Eye-Both, q8hr, # 15 mL, 11 Refill(s)  brinzolamide 1% ophthalmic suspension: 1 drop(s), OPTH, TID, # 10 mL, 11 Refill(s)  dorzolamide 2% ophthalmic solution: 1 drop(s), Eye-Left, TID, # 10 mL, 3 Refill(s)  latanoprost 0.005% ophthalmic solution: 1 drop(s), Eye-Both, Once a day (at bedtime), # 2.5 mL, 11 Refill(s)  prednisoLONE acetate 1% ophthalmic suspension: See Instructions, 1 drop(s) Eye-Left times a day, # 10 mL, 1 Refill(s), Pharmacy: [facility]  Documented Medications  Documented  ALBUTEROL    NEB 0.083%:   ATROPINE SUL SOL 1% OP:   AZOPT        JAZMIN 1% OP: 1 drop(s), OPTH, BID  Dulcolax Stool Softener 100 mg oral capsule: 100 mg = 1 cap(s), Oral, Daily, PRN PRN for constipation, 0 Refill(s)  Fish Oil oral capsule: 2 cap(s), Oral, Daily, 0 Refill(s)  KETOROLAC    SOL 0.5%:   LISINOP/HCTZ TAB 20-12.5:   PREDNISONE   TAB 20M mg = 1 tab(s), Oral, Daily  PREDNISONE   TAB 5MG:   RANITIDINE   TAB 150M mg = 1 tab(s), Oral, BID  Sandoz Lisinopril HCT 12.5 mg-20 mg oral tablet: 2 tablets, Oral, Daily, 0 Refill(s)  Tobradex 0.3%-0.1% ophthalmic ointment: 0.5 in, Eye-Left, Once a day (at bedtime), 0 Refill(s)  Vitamin D3 2000 intl units oral tablet: 2,000 IntUnit = 1 tab(s), Oral, Daily, 0 Refill(s)  moxifloxacin 0.5% ophthalmic solution: 1 drop(s), Eye-Left, QID, # 3 mL, 0 Refill(s)  omeprazole 20 mg  oral DR capsule: 20 mg = 1 cap(s), Oral, Daily, # 30 cap(s), 0 Refill(s),    Home Medications (25) Active  ALBUTEROL    NEB 0.083%   amlodipine 5 mg oral tablet 5 mg = 1 tab(s), Oral, Daily  aspirin 325 mg oral tablet 325 mg = 1 tab(s), Oral, Daily  atorvastatin 20 mg oral tablet 20 mg = 1 tab(s), Oral, Daily  ATROPINE SUL SOL 1% OP   AZOPT        JAZMIN 1% OP 1 drop(s), OPTH, BID  brimonidine ophthalmic 0.15% solution 1 drop(s), Eye-Both, q8hr  brinzolamide 1% ophthalmic suspension 1 drop(s), OPTH, TID  Coreg 12.5 mg oral tablet 12.5 mg = 1 tab(s), Oral, BID  dorzolamide 2% ophthalmic solution 1 drop(s), Eye-Left, TID  Dulcolax Stool Softener 100 mg oral capsule 100 mg = 1 cap(s), PRN, Oral, Daily  Fish Oil oral capsule 2 cap(s), Oral, Daily  KETOROLAC    SOL 0.5%   latanoprost 0.005% ophthalmic solution 1 drop(s), Eye-Both, Once a day (at bedtime)  LISINOP/HCTZ TAB 20-12.5   moxifloxacin 0.5% ophthalmic solution 1 drop(s), Eye-Left, QID  omeprazole 20 mg oral DR capsule 20 mg = 1 cap(s), Oral, Daily  Pred Forte 1% ophthalmic suspension 1 drop(s), Eye-Left, QID  prednisoLONE acetate 1% ophthalmic suspension See Instructions  PREDNISONE   TAB 20MG 20 mg = 1 tab(s), Oral, Daily  PREDNISONE   TAB 5MG   RANITIDINE   TAB 150MG 150 mg = 1 tab(s), Oral, BID  Sandoz Lisinopril HCT 12.5 mg-20 mg oral tablet 2 tablets, Oral, Daily  Tobradex 0.3%-0.1% ophthalmic ointment 0.5 in, Eye-Left, Once a day (at bedtime)  Vitamin D3 2000 intl units oral tablet 2,000 IntUnit = 1 tab(s), Oral, Daily     Problem list:    All Problems  Acid reflux / SNOMED CT 4260132199 / Confirmed  Anxiety / SNOMED CT 19634300 / Confirmed  CAD (coronary atherosclerotic disease) / SNOMED CT [id number] / Confirmed  Cane / SNOMED CT 709027070 / Confirmed  Cataracts, bilateral / SNOMED CT [id number] / Confirmed  Chronic back pain / SNOMED CT 548575895 / Confirmed  Intervertebral disc disorder with  radiculopathy of lumbar region / SNOMED CT 7923296394 / Confirmed  Glaucoma / SNOMED CT 58349187 / Confirmed  Glaucoma / SNOMED CT 52549713 / Confirmed  Hyperlipidaemia / SNOMED CT 965049175 / Confirmed  Hypertension / SNOMED CT YA04E9J9--[id number] / Confirmed  Lumbar radiculopathy / SNOMED CT 785375033 / Confirmed  Obesity / SNOMED CT 5692804585 / Probable  Panic attacks / SNOMED CT [id number]-LJ0LH127NNB1 / Confirmed  Lumbar herniated disc / SNOMED CT 741393745 / Confirmed  Seasonal allergies / SNOMED CT [id number] / Confirmed  Sleep apnea / SNOMED CT 285509112 / Confirmed  SOB - Shortness of breath / SNOMED CT 465851288 / Confirmed  Lumbar spinal stenosis / SNOMED CT 26993813 / Confirmed  Vision impairment / SNOMED CT G647G6IQ-[id number]-[id number] / Confirmed  Wears glasses / SNOMED CT 116984828 / Confirmed  Resolved: Paroxysmal atrial fibrillation / SNOMED CT 649250453  Canceled: Hyperlipidemia / SNOMED CT 21464248  Canceled: Hypertension / SNOMED CT CR11R7O8--H7X7-D1OC1TH23F08  Canceled: Paroxysmal atrial fibrillation / SNOMED CT 4954794329,    Active Problems (21)  Acid reflux   Anxiety   CAD (coronary atherosclerotic disease)   Cane   Cataracts, bilateral   Chronic back pain   Glaucoma   Glaucoma   Hyperlipidaemia   Hypertension   Intervertebral disc disorder with radiculopathy of lumbar region   Lumbar herniated disc   Lumbar radiculopathy   Lumbar spinal stenosis   Obesity   Panic attacks   Seasonal allergies   Sleep apnea   SOB - Shortness of breath   Vision impairment   Wears glasses         Histories   Past Medical History:    Active  Vision impairment ([id number])  Cataracts, bilateral ([id number])  Hypertension (XZ46L4Q9--J0U4-J4MA1JH99Q02)  CAD (coronary atherosclerotic disease) ([id number])  Glaucoma (73128615)  Wears glasses  (285195325)  Seasonal allergies ([id number])  Sleep apnea (156770427)  SOB - Shortness of breath (280393846)  Acid reflux (1666796587)  Cane (879050658)  Chronic back pain (461402783)  Anxiety (69939429)  Panic attacks (64PG57P7-8U06-2OK5-649C-HD9BE690ZST8)  Resolved  Paroxysmal atrial fibrillation (361622691):  Resolved.   Family History:    Open heart surgery  Father  Metastatic cancer  Sister (sister, )  Hypertension.  Father  Mother  Heart disease.  Father    Father  Mother     Procedure history:    Blebectomy (Left) on 4/3/2018 at 62 Years.  Comments:  4/3/2018 17:55 - Humera Burgess RN  auto-populated from documented surgical case  Blebectomy (Left) on 2018 at 62 Years.  Comments:  2018 15:30 - Tessie Francisco RN  auto-populated from documented surgical case  Trabeculectomy (Left) on 2018 at 62 Years.  Comments:  2018 13:55 - Ml Urrutia RN  auto-populated from documented surgical case  Trabeculectomy (Left) on 2017 at 61 Years.  Comments:  2017 08:43 - Tessie Francisco RN  auto-populated from documented surgical case  34153 - INJ FORAMEN EPIDURAL LUM / SAC 1 LEVEL (Right) on 2017 at 61 Years.  Comments:  2017 10:18 - Aidan Lange RN  auto-populated from documented surgical case  59926 - INJ FORAMEN EPIDURAL LUM / SAC EA ADD'L LEVEL (Right) on 2017 at 61 Years.  Comments:  2017 10:18 - Aidan Lange RN  auto-populated from documented surgical case  Colonoscopy on 2014 at 58 Years.  Comments:  2014 09:47 - Dalia Doyle RN  auto-populated from documented surgical case  Cataract (060514762).  Hernia repair (15988160).  Appendectomy (781564933).   Social History        Social & Psychosocial Habits    Alcohol  2014  Use: Current    Type: Beer    Frequency: 1-2 times per year    Employment/School  2017  Status: Unemployed    Exercise  2014 Risk Assessment: Does not exercise       Comment: None - 03/20/2017 09:59 - Amalia Garcia    Home/Environment  10/21/2016  Lives with: GRAND DAUGTHER, Spouse    Living situation: Home/Independent    Alcohol abuse in household: No    Substance abuse in household: No    Smoker in household: No    Feels unsafe at home: No    Safe place to go: Yes    Family/Friends available to help: Yes    Concern for family members at home: No    Major illness in household: No    Nutrition/Health  10/21/2016  Type of diet: Regular    Caffeine intake amount: COFFEE    Wants to lose weight: No    Sleeping concerns: No    Feels highly stressed: No    Substance Abuse  07/02/2014 Risk Assessment: Denies Substance Abuse    05/22/2015  Use: Never    Tobacco  07/02/2014 Risk Assessment: Denies Tobacco Use    05/22/2015  Use: Never smoker  .        Physical Examination   VS/Measurements      Impression and Plan   VAsc: OS CF  Ta: 3    SLE  L/L: MGD OU, ptosis left upper lid  C/S: OS trace injection, sutures superiorly, leaking bleb superiorly, nylon suture intact  K: PEEs OU  AC: OS formed, no IK touch centrally, quiet  I: no NVI   L: 2+NSC with 1-2+ CC changes    See previous notes for prior glaucoma testing    Assessment/Plan  1. s/p trab revision OS with scleral patch graft 4/3/18  - Due to persistent bleb leak with resultant hypotony with maculopathy  - s/p trab (11/18/17), trab revision x3 (2/21/18, 2/26/18, 4/3/18)  - s/p AC re-inflation with Healon at slit lamp 4/16/18  - S/p Low temp cautery to site at slit lamp with placement of 6-0 nylon suture at slit lamp with pressure patch 2 d ago  - No B/L/S, no eye rubbing, eye shield while asleep  - Will need refraction when vision/pressure stable  - Will schedule for Trabeculectomy Revision on 5/29/18.  RBA discussed.  Consent obtained.    2. Primary Open Angle Glaucoma OD  - IOP stable  - Continue Azopt TID OD, Alphagan P TID OD, latanoprost QHS OD    3. Dry Eye Syndrome  - pt is very symptomatic (describes intermittent  blurred vision that is very bothersome)  - pt has scleral show, recommended AT jean QHS again  - Address again when (1) stable    4. PCIOL OD  - PCO may be VS - pt reports glare while driving at night. Will address after (1) stable    6. NSC OS  - NVS currently -- monitor    7. Ptosis OS  - pt states that he only has noticed it since the surgery  - MRD1 0.5 OS    RTC POD #1     Addendum by Jcarlos Siddiqui MD on May 22, 2018 8:41 CDT (Verified)  patient seen/discussed  needs revision again for persistent leak  all rba explained in full  VERY difficult eye to correct due to original surgery    Result type: Ophthalmology Office/Clinic Note  Result date: May 22, 2018 8:11 CDT  Result status: Modified  Result title: Ophthalmic Examination Visit *  Performed by: Devaughn Whaley MD on May 22, 2018 8:40 CDT  Verified by: Devaughn Whaley MD on May 22, 2018 8:40 CDT  Encounter info: [id number], [facility], Clinic Visit, 5/22/2018 - 5/22/2018    Doc has been moved by HIM specialist.

## 2022-04-30 NOTE — H&P
* Final Report *  Document Contains Addenda  Pre-Op H&P     Patient:   Doug Faye             MRN: [id number]            FIN: [id number]               Age:   62 years     Sex:  Male     :  1955   Associated Diagnoses:   None   Author:   Tereso Manzano MD      Chief Complaint   2018 8:29 CST       s/p Bleb revision OS, c/o burning scratching and tearing OS        History of Present Illness   HPI: Pt states that he is feeling well.      Health Status   Allergies:    Allergic Reactions (All)  Severity Not Documented  Penicillins- Passed out.  Canceled/Inactive Reactions (All)  No Known Medication Allergies,    Allergies (1) Active Reaction  penicillins Passed out     Current medications:  (Selected)   Inpatient Medications  Ordered  Valium 5 mg oral tablet: 10 mg, form: Tab, Oral, Once-Unscheduled, first dose 18 11:43:00 CST  Versed: 5 mg, form: Soln, IV Slow, Titrate, first dose 14 10:09:00 CDT, Titrated slowly as instructed by procedure MD, to provide conscious sedation., [phone]  meperidine: 50 mg, form: Injection, IV Slow, Titrate, first dose 14 10:09:00 CDT, titrated slowly as instructed by procedure MD, to provide conscious sedation., 1,023,846  mitomycin 0.4 mg/mL Ophth. Soln. (0.04%): 5 mg, form: Inj-Ophth, Intraocular, Once, first dose 17 6:00:00 CST, stop date 17 6:00:00 CST, 1,023,851  Prescriptions  Prescribed  Coreg 12.5 mg oral tablet: 12.5 mg = 1 tab(s), Oral, BID, # 60 tab(s), 6 Refill(s), Pharmacy: [id number]  Pred Forte 1% ophthalmic suspension: 1 drop(s), Eye-Left, QID, # 10 mL, 0 Refill(s), Pharmacy: [facility]  Vascepa 1 g oral capsule: 2 gm = 2 cap(s), Oral, BID, # 60 cap(s), 6 Refill(s), Pharmacy: [facility]  amlodipine 5 mg oral tablet: 5 mg = 1 tab(s), Oral, Daily, # 30 tab(s), 6 Refill(s), Pharmacy: [facility]  aspirin 325 mg oral tablet: 325 mg = 1 tab(s), Oral, Daily, # 30 tab(s), 6  Refill(s), other reason (Rx)  atorvastatin 20 mg oral tablet: 20 mg = 1 tab(s), Oral, Daily, # 30 tab(s), 6 Refill(s), Pharmacy: [facility]  atropine 1% ophthalmic solution: 1 drop(s), Eye-Left, QID, X 10 day(s), # 5 mL, 3 Refill(s), Pharmacy: [facility]  atropine 1% ophthalmic solution: 1 drop(s), Eye-Left, TID, X 10 day(s), # 5 mL, 1 Refill(s), Pharmacy: [facility]  brimonidine ophthalmic 0.15% solution: 1 drop(s), Eye-Both, q8hr, # 15 mL, 11 Refill(s)  dorzolamide 2% ophthalmic solution: 1 drop(s), Eye-Left, TID, # 10 mL, 3 Refill(s)  ketorolac 10 mg oral tablet: See Instructions, TAKE ONE TABLET BY MOUTH FOUR TIMES A DAY AS NEEDED FOR PAIN, # 50 tab(s), eRx: [facility]  latanoprost 0.005% ophthalmic solution: 1 drop(s), Eye-Both, Once a day (at bedtime), # 2.5 mL, 11 Refill(s)  methocarbamol 750 mg oral tablet: See Instructions, TAKE ONE TABLET BY MOUTH THREE TIMES A DAY FOR 14 DAYS, # 42 tab(s), eRx: [facility] [address] [facility]  prednisONE 20 mg oral tablet: 20 mg = 1 tab(s), Oral, Daily, # 30 tab(s), 0 Refill(s), Pharmacy: [facility]  prednisONE 5 mg oral tablet: 10 mg = 2 tab(s), Oral, Daily, Please take 10 mg (2 pills) daily for 1 week, then take 5 mg (1 pill) daily for 1 week, then stop, # 21 tab(s), 0 Refill(s), Pharmacy: [facility], Patient Verbalizes Understanding  prednisoLONE acetate 1% ophthalmic suspension: See Instructions, 1 drop in the left eye 8 times a day, # 10 mL, 3 Refill(s), Pharmacy: [facility]  traMADol 50 mg oral tablet: 50 mg = 1 tab(s), Oral, q4hr, PRN PRN as needed for pain, # 30 tab(s), 0 Refill(s), called to pharmacy (Rx)  Documented Medications  Documented  Dulcolax Stool Softener 100 mg oral capsule: 100 mg = 1 cap(s), Oral, Daily, PRN PRN for constipation, 0 Refill(s)  Fish Oil oral capsule: 2 cap(s), Oral, Daily, 0 Refill(s)  Sandoz Lisinopril HCT 12.5 mg-20 mg oral tablet: 2 tablets, Oral, Daily, 0  Refill(s)  Tobradex 0.3%-0.1% ophthalmic ointment: 0.5 in, Eye-Left, Once a day (at bedtime), 0 Refill(s)  Vitamin D3 2000 intl units oral tablet: 2,000 IntUnit = 1 tab(s), Oral, Daily, 0 Refill(s)  moxifloxacin 0.5% ophthalmic solution: 1 drop(s), Eye-Left, QID, # 3 mL, 0 Refill(s)  omeprazole 20 mg oral DR capsule: 20 mg = 1 cap(s), Oral, Daily, # 30 cap(s), 0 Refill(s),    Home Medications (24) Active  amlodipine 5 mg oral tablet 5 mg = 1 tab(s), Oral, Daily  aspirin 325 mg oral tablet 325 mg = 1 tab(s), Oral, Daily  atorvastatin 20 mg oral tablet 20 mg = 1 tab(s), Oral, Daily  atropine 1% ophthalmic solution 1 drop(s), Eye-Left, TID  atropine 1% ophthalmic solution 1 drop(s), Eye-Left, QID  brimonidine ophthalmic 0.15% solution 1 drop(s), Eye-Both, q8hr  Coreg 12.5 mg oral tablet 12.5 mg = 1 tab(s), Oral, BID  dorzolamide 2% ophthalmic solution 1 drop(s), Eye-Left, TID  Dulcolax Stool Softener 100 mg oral capsule 100 mg = 1 cap(s), PRN, Oral, Daily  Fish Oil oral capsule 2 cap(s), Oral, Daily  ketorolac 10 mg oral tablet See Instructions  latanoprost 0.005% ophthalmic solution 1 drop(s), Eye-Both, Once a day (at bedtime)  methocarbamol 750 mg oral tablet See Instructions  moxifloxacin 0.5% ophthalmic solution 1 drop(s), Eye-Left, QID  omeprazole 20 mg oral DR capsule 20 mg = 1 cap(s), Oral, Daily  Pred Forte 1% ophthalmic suspension 1 drop(s), Eye-Left, QID  prednisoLONE acetate 1% ophthalmic suspension See Instructions  prednisONE 20 mg oral tablet 20 mg = 1 tab(s), Oral, Daily  prednisONE 5 mg oral tablet 10 mg = 2 tab(s), Oral, Daily  Sandoz Lisinopril HCT 12.5 mg-20 mg oral tablet 2 tablets, Oral, Daily  Tobradex 0.3%-0.1% ophthalmic ointment 0.5 in, Eye-Left, Once a day (at bedtime)  traMADol 50 mg oral tablet 50 mg = 1 tab(s), PRN, Oral, q4hr  Vascepa 1 g oral capsule 2 gm = 2 cap(s), Oral, BID  Vitamin D3 2000 intl units oral tablet 2,000 IntUnit = 1 tab(s), Oral, Daily        Histories   Past Medical  History:    Active  Vision impairment ([id number])  Cataracts, bilateral ([id number])  Hypertension ([id number])  CAD (coronary atherosclerotic disease) ([id number])  Glaucoma (79832219)  Wears glasses (140800915)  Seasonal allergies ([id number])  Sleep apnea (445256784)  SOB - Shortness of breath (235015352)  Acid reflux (0686320643)  Cane (038953613)  Chronic back pain (376967525)  Anxiety (01304450)  Panic attacks ([id number])  Resolved  Paroxysmal atrial fibrillation (726246290):  Resolved.   Family History:    Open heart surgery  Father  Metastatic cancer  Sister (sister, )  Hypertension.  Father  Mother  Heart disease.  Father    Father  Mother     Procedure history:    Trabeculectomy (Left) on 2018 at 62 Years.  Comments:  2018 13:55 - Ml Urrutia RN  auto-populated from documented surgical case  Trabeculectomy (Left) on 2017 at 61 Years.  Comments:  2017 08:43 - Tessie Frnacisco RN  auto-populated from documented surgical case  65117 - INJ FORAMEN EPIDURAL LUM / SAC 1 LEVEL (Right) on 2017 at 61 Years.  Comments:  2017 10:18 - Aidan Lange RN  auto-populated from documented surgical case  94364 - INJ FORAMEN EPIDURAL LUM / SAC EA ADD'L LEVEL (Right) on 2017 at 61 Years.  Comments:  2017 10:18 - Aidan Lange RN  auto-populated from documented surgical case  Colonoscopy on 2014 at 58 Years.  Comments:  2014 09:47 - Dalia Doyle RN  auto-populated from documented surgical case  Cataract ([id number]).  Hernia repair (40967175).  Appendectomy (903772528).   Social History        Social & Psychosocial Habits    Alcohol  2014  Use: Current    Type: Beer    Frequency: 1-2 times per year    Employment/School  2017  Status: Unemployed    Exercise  2014 Risk Assessment:  Does not exercise      Comment: None - 03/20/2017 09:59 - Amalia Garcia    Home/Environment  10/21/2016  Lives with: GRAND DAUGTHER, Spouse    Living situation: Home/Independent    Alcohol abuse in household: No    Substance abuse in household: No    Smoker in household: No    Feels unsafe at home: No    Safe place to go: Yes    Family/Friends available to help: Yes    Concern for family members at home: No    Major illness in household: No    Nutrition/Health  10/21/2016  Type of diet: Regular    Caffeine intake amount: COFFEE    Wants to lose weight: No    Sleeping concerns: No    Feels highly stressed: No    Substance Abuse  07/02/2014 Risk Assessment: Denies Substance Abuse    05/22/2015  Use: Never    Tobacco  07/02/2014 Risk Assessment: Denies Tobacco Use    05/22/2015  Use: Never smoker  .        Physical Examination   Vital Signs   2/26/2018 13:09 CST      24 HR Intake Totals       0 mL    2/26/2018 12:10 CST      Temperature Oral          36.7 DegC                             Temperature Oral (calculated)             98.06 DegF                             Peripheral Pulse Rate     68 bpm                             Respiratory Rate          18 br/min                             SpO2                      96 %                             Systolic Blood Pressure   147 mmHg  HI                             Diastolic Blood Pressure  89 mmHg                             Mean Arterial Pressure, Cuff              108 mmHg                             Blood Pressure Location   Left arm                             Blood Pressure Cuff Size  Adult long        Health Maintenance      Health Maintenance     Pending (in the next year)        OverDue           Aspirin Therapy for CVD Prevention due  07/18/17  and every 1  year(s)        Due            Tetanus Vaccine due  02/26/18  and every 10  year(s)           Zoster Vaccine due  02/26/18  and every 100  year(s)        Postponed            Influenza Vaccine due   09/22/18  and every 1  year(s)        Due In Future            Depression Screening not due until  12/12/18  and every 1  year(s)           Alcohol Misuse Screening not due until  01/22/19  and every 1  year(s)     Satisfied (in the past 1 year)        Satisfied            Alcohol Misuse Screening on  01/22/18.  Satisfied by Destiny Hammond LPN           Blood Pressure Screening on  02/26/18.  Satisfied by Twin Pool RN           Body Mass Index Check on  02/26/18.  Satisfied by Cinthia Ahumada LPN           Depression Screening on  12/12/17.  Satisfied by Mary Sullivan LPN           Diabetes Screening on  02/26/18.  Satisfied by Kaur Timmons           Lipid Screening on  11/29/17.  Satisfied by Ashwin Moulton           Obesity Screening on  02/26/18.  Satisfied by Cinthia Ahumada LPN        Postponed            Influenza Vaccine until  01/22/18.  Recorded for Destiny Hammond LPN  Reason: Postpone due to refusal       PE  Gen: AOx3  HENT: mmm  Eye: bleb leak OS  CV: no SOB  Chest: no increased work of breathing  Abd: soft  Ext: full ROM  Neuro: grossly normal      Impression and Plan   A/P:  --pt seen and examined  -- to OR for bleb revision OS     Addendum by Jcarlos Siddiqui MD on February 26, 2018 14:37 CST (Verified)  patient seen/examined  small buttonhole leak to bleb OS  needs immediate repair    Result type: Ophthalmology Office/Clinic Note  Result date: February 26, 2018 13:29 CST  Result status: Modified  Result title: Pre-Op H&P  Performed by: Tereso Manzano MD on February 26, 2018 13:32 CST  Verified by: Tereso Manzano MD on February 26, 2018 13:32 CST  Encounter info: [id number], [facility], Day Surgery, 2/26/2018 - 2/26/2018    Doc has been moved by HIM specialist to the correct doc type.

## 2022-04-30 NOTE — PROGRESS NOTES
Patient:   Doug Faye             MRN: [id number]            FIN: [id number]               Age:   62 years     Sex:  Male     :  1955   Associated Diagnoses:   None   Author:   Ritika HAMMER, Emily Paredes reviewed: Will discuss with patient during follow up appointment on  2018 at 7:30am.

## 2022-04-30 NOTE — OP NOTE
DATE OF SURGERY:11-17-17    PRIMARY SURGEON: Eyad Philippe MD    STAFF SURGEON:  Halima Mills MD    PREOPERATIVE DIAGNOSIS: Open angle glaucoma left eye    POSTOPERATIVE DIAGNOSIS:  Same status post procedure.    OPERATION: Trabeculectomy with Mitomycin-C left eye    Anesthesia:  General IV.  Complications:  None.  Estimated blood loss:  Less than 1 cc.    INDICATION FOR PROCEDURE:  The patient was evaluated in clinic and noted to have elevated intraocular pressure and progression of visual field despite maximum medical therapy.  Options were discussed with the patient and family and patient elected for surgical intervention.  Risks, benefits and alternatives were explained.  Informed consent was obtained from the patient in clinic.    PROCEDURE IN DETAIL:  The patient was brought into the OR and laid in supine position.  General IV anesthesia was undertaken without complication.  The lids and the eye were prepped in usual manner for intraocular surgery.  A wire lid speculum was placed in the operative eye for adequate exposure to the surgical site.      A 7-0 Vicryl suture was placed in the cornea superiorly and the eye moved inferiorly for exposure to the surgical site.  A small peritomy was made superiorly and a mixture of preservative free lidocaine and epinephrine was injected into the sub Tenon's space for further anesthesia.  The incision was then continued nasally for approximately 5 mm.  The conjunctiva was undermined posteriorly with blunt dissection.  Wet-Field cautery was used for hemostasis.  A 4 x 3 mm partial thickness flap was made in the sclera using a crescent blade.    A paracentesis was made temporally and a small amount of Healon was injected into the anterior chamber. A 25 gauge needle was used to make an entry under the flap. An Express shunt was placed, but did not sit in good position. It was removed and a paracentesis blade used to make a sclerotomy. The Kellp punch was used and  and small amount of iris removed for a surgical iridectomy.   The flap was secured with 4 10-0 nylon sutures. The AC was reinflated with BSS in stable form.  There was a small amount of fluid leakage from the wound which was ideal for this surgery.  The conjunctiva was closed with interrupted and mattress 10-0 nylon suture.  BSS was injected AC and the fluid was noted to efflux into the conjunctiva.  The IOP was judged to be physiologic.  The wounds were watertight.  0.1 cc of MMC (0.4 mg/ml) was injected under the conjunctiva. Postop injections were given and wire lid speculum was removed, TobraDex ointment applied to the operative eye followed by a     pressure patch and shield.  The patient left the operating room in stable condition, having tolerated the surgery well.

## 2022-04-30 NOTE — OP NOTE
Patient:   Doug Faye             MRN: [id number]            FIN: [id number]               Age:   62 years     Sex:  Male     :  1955   Associated Diagnoses:   None   Author:   Devaughn Whaley MD      Preop dx: Bleb leak OS  Postop dx: same  Procedure: Bleb/Trab revision OS, CPT: 99724  Staff: Dr. Siddiqui  Primary Surgeon: Dr. Siddiqui  Assistant: Dr. Whaley  Anesth: General  EBL: <5 cc  Complications: none  Detail:    After informed consent was obtained, the patient was taken to the operative suite and placed in supine  position on the operative table.  General intubation anesthesia was achieved by the Anesthesiology Team.   Attention was turned to the left eye where it was prepped and draped in sterile ophthalmic fashion and speculum was placed.   Fluorescein was instilled into the eye and the conjunctiva was checked for any leaks. The area of previously noted leakage was found and a partial peritomy was done to expose the scleral graft superiorly. The multiple interuppted 10-0 Nylon sutures were utilized to close the sclera overlying the bleb. Additional 8-0 Nylon was used to reapproximate the conjunctiva. Fluorescein strips were utilized to evaluate the eye for bleb leak. Additional 8-0 Nylon and 7-0 Vicryl sutures were utilized until no leak was noted (som negative). The BSS was injected into the AC and the wound was re-evalauted to ensure it was som negative at the end of the case. No other sites of leakage were noted. Viagmox and pred forte drops were instilled into the eye. The patient tolerated the procedure well, no complications ocurred. The patient was extubated and awoken by the Anesthesiology Team. The patient was instructed to follow-up the following day in clinic.

## 2022-04-30 NOTE — OP NOTE
* Final Report *  Document Contains Addenda  Op Note     Patient:   Doug Faye             MRN: [id number]            FIN: [id number]               Age:   62 years     Sex:  Male     :  1955   Associated Diagnoses:   None   Author:   Tereso Manzano MD      Preop dx: Bleb leak OS  Postop dx: same  Procedure: Bleb revision OS  Anesth: local/MAC  EBL: <5 cc  Complications: none  Detail:    After informed consent was obtained, the patient was taken to the operative suite and placed in supine  position on the operative table.  Attention was turned to the left eye where it was prepped and draped in  sterile ophthalmic fashion and speculum was placed. Fluorescein was instilled into the eye and the conjunctiva was checked for any leaks. The area of previously noted leakage was found and the conjunctiva was close with 11-0 nylon. No other sites of leakage were noted. Tobradex jean was placed into the eye. The pt tolerated the procedure well, no complications ocurred. The pt was instructed to follow-up the following do in clinic.     Addendum by Jcarlos Siddiqui MD on 2018 13:48 CST (Verified)  I was scrubbed in for the entire procedure    CPT: 41143    Result type: Ophthalmology Office/Clinic Note  Result date: 2018 13:43 CST  Result status: Modified  Result title: Op Note  Performed by: Tereso Manzano MD on 2018 13:46 CST  Verified by: Tereso Manzano MD on 2018 13:46 CST  Encounter info: [id number], [facility], Clinic Visit, 2018 - 2018    Doc has been moved by HIM specialist to the correct encounter.

## 2022-04-30 NOTE — H&P
Patient:   Doug Faye             MRN: [id number]            FIN: [id number]               Age:   61 years     Sex:  Male     :  1955   Associated Diagnoses:   None   Author:   Eyad Philippe MD      HPI: Patient presents with elevated IOP in the left eye    ROS:   Constitutional negative  Eye see HPI  ENMT negative  Respiratory negative  Cardiovascular negative  Gastrointestinal negative  Genitourinary negative  Hema/Lymph negative  Endocrine negative  Immunologic negative  Musculoskeletal negative  Integumentary negative  Neurologic negative  All Other ROS negative     PE:  General NAD  Eye per HP  Neck normal ROM  Respiratory normal RR  Cardiovascular Dual HR, normal rate and rhythm  Gastrointestinal no tenderness to palpation  Musculoskeletal normal ROM  Neurologic no focal neuro deficits    A/P:  Glaucoma  Plan for surgical correction left eye.   R/B/A explained to patient, all questions answered.

## 2022-05-03 NOTE — HISTORICAL [facility]ER
This is a historical note converted from [facility]. Formatting and pictures may have been removed.  Please reference [facility] for original formatting and attached multimedia. Chief Complaint  Follow up appt  History of Present Illness  62-year-old white male presents for follow-up appointment.? 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 Patient states he is recovered well from these procedures. ?However, today he complains of a three-week history of weak urinary stream, mostly in the mornings. ?However, denies dysuria, hematuria, pelvic pain, fever, chills, nausea, vomiting, etc.  States he has not received his BPA?machine yet.P  He does not need any refills today.  Review of Systems  ????Constitutional: No fever, No chills, No weakness, No fatigue.  ?????Eye: No recent visual problem.  ?????Respiratory: No shortness of breath, No cough, No sputum production, No wheezing.  ?????Cardiovascular: No chest pain, No palpitations, No peripheral edema.  ?????Gastrointestinal: No nausea, No vomiting, No diarrhea, No constipation, No heartburn, No abdominal pain.  ?????Genitourinary: No dysuria.? Weak urinary stream.  ?????Endocrine: No excessive thirst, No polyuria, No cold intolerance, No heat intolerance.  ?????Musculoskeletal: No back pain, No joint pain, No decreased range of motion.  ?????Integumentary: No rash, No pruritus.  ?????Neurologic: Alert and oriented X4, No numbness, No tingling, No headache.  ?????Psychiatric: No anxiety, No depression.  Physical Exam  Vitals & Measurements  T:?36.9? ?C ?(Oral)? HR:?88?(Peripheral)? RR:?18? BP:?135/86?  HT:?180?cm? HT:?180?cm? HT:?180?cm? WT:?119.2?kg? WT:?119.2?kg? WT:?119.2?kg? BMI:?36.79?  General: Alert and oriented, No acute distress.? Obese.  ?????Eye: Pupils are equal, round and reactive to  light, Extraocular movements are intact, Normal conjunctiva.  ?????HENT: Normocephalic, Oral mucosa is moist, No pharyngeal erythema.  ?????Neck: Supple, Non-tender.  ?????Respiratory: Lungs are clear to auscultation, Respirations are non-labored.  ?????Cardiovascular: Normal rate, Regular rhythm, No murmur, Good pulses equal in all extremities, No edema.  ?????Gastrointestinal: Soft, Non-tender, Non-distended, Normal bowel sounds.  ?????Musculoskeletal: Normal range of motion, Normal strength.  ?????Integumentary: Warm, Dry.  ?????Neurologic: Alert, Oriented.  ?????Cognition and Speech: Oriented, Speech clear and coherent.  ?????Psychiatric: Cooperative, Appropriate mood & affect.  Assessment/Plan  Ordered:  CBC w/ Auto Diff, Routine collect, *Est. 07/22/18 3:00:00 CDT, Blood, Order for future visit, *Est. Stop date 07/22/18 3:00:00 CDT, Lab Collect, CAD (coronary artery disease)  Well-controlled hypertension  Mixed hyperlipidemia, 6, month(s), In Approximately, 01/22/18...  Clinic Follow up, *Est. 07/30/18 8:30:00 CDT, Order for future visit, Well-controlled hypertension, [facility]  Comprehensive Metabolic Panel, Routine collect, *Est. 07/22/18 3:00:00 CDT, Blood, Order for future visit, *Est. Stop date 07/22/18 3:00:00 CDT, Lab Collect, CAD (coronary artery disease)  Well-controlled hypertension  Mixed hyperlipidemia, 6, month(s), In Approximately, 01/22/18...  Lipid Panel, Routine collect, *Est. 07/22/18 3:00:00 CDT, Blood, Order for future visit, *Est. Stop date 07/22/18 3:00:00 CDT, Lab Collect, CAD (coronary artery disease)  Well-controlled hypertension  Mixed hyperlipidemia, 6, month(s), In Approximately, 01/22/18...  Prostatic Specific Antigen Screening Test, Routine collect, *Est. 01/22/18 3:00:00 CST, Blood, Order for future visit, *Est. Stop date 01/22/18 3:00:00 CST, Lab Collect, Weak urine stream, On Exactly, 01/22/18 9:28:00 CST  Urinalysis with Microscopic if Indicated, Routine collect,  Urine, Order for future visit, *Est. 01/22/18 3:00:00 CST, *Est. Stop date 01/22/18 3:00:00 CST, Nurse collect, Weak urine stream, On Exactly, 01/22/18 9:28:00 CST  Urine Culture [id number], Routine collect, *Est. 01/22/18 3:00:00 CST, Order for future visit, Urine, Nurse collect, *Est. Stop date 01/22/18 3:00:00 CST, Weak urine stream, On Exactly  ?  1.? Hypertension:?Well controlled?on amlodipine, Coreg, lisinopril and hydrochlorothiazide, no changes today.  2.? Hyperlipidemia:?LDL at goal. Continue atorvastatin.  3.? Coronary artery disease: Management as above. ?Keep scheduled follow-up appointment in cardiology clinic.  4.? Obesity: Advised on lifestyle changes.  5.??Obstructive sleep apnea:?Sleep study revealed severe sleep apnea, BPAP 25/15 cm H20 was recommended.??Patient has been instructed to call his insurance company as he has yet to receive his machine.  6. ?Diastolic dysfunction:?Continue beta blocker,?ACE inhibitor.? Keep scheduled follow-up appointment in cardiology clinic.  7.? Three-week history of weak urinary stream: Check PSA, urinalysis and culture today.? Patient has been instructed to call back in about 3 weeks and let us know?if symptoms have resolved, if not,?will likely start on?Flomax.  ?[1]  Patient voiced understanding.   Problem List/Past Medical History  Ongoing  Acid reflux  Anxiety  CAD (coronary atherosclerotic disease)  Cane  Cataracts, bilateral  Chronic back pain  Glaucoma  Hyperlipidaemia  Hypertension  Intervertebral disc disorder with radiculopathy of lumbar region  Lumbar herniated disc  Lumbar radiculopathy  Lumbar spinal stenosis  Obesity  Panic attacks  Seasonal allergies  Sleep apnea  SOB - Shortness of breath  Vision impairment  Wears glasses  Historical  Paroxysmal atrial fibrillation  Procedure/Surgical History  Excision of Left Iris, Percutaneous Approach (11/17/2017)  Fistulization of sclera for glaucoma; sclerectomy with punch or scissors, with iridectomy  (11/17/2017)  Trabeculectomy (Left) (11/17/2017)  00704 - INJ FORAMEN EPIDURAL LUM / SAC 1 LEVEL (Right) (04/06/2017)  56256 - INJ FORAMEN EPIDURAL LUM / SAC EA ADDL LEVEL (Right) (04/06/2017)  Fluoroscopy of Lumbar Spine using Low Osmolar Contrast (04/06/2017)  Fluoroscopy of Sacrum and Coccyx using Low Osmolar Contrast (04/06/2017)  Injection(s), anesthetic agent and/or steroid, transforaminal epidural, with imaging guidance (fluoroscopy or CT); lumbar or sacral, each additional level (List separately in addition to code for primary procedure) (04/06/2017)  Injection(s), anesthetic agent and/or steroid, transforaminal epidural, with imaging guidance (fluoroscopy or CT); lumbar or sacral, single level (04/06/2017)  Introduction of Anti-inflammatory into Spinal Canal, Percutaneous Approach (04/06/2017)  Introduction of Local Anesthetic into Spinal Canal, Percutaneous Approach (04/06/2017)  Colonoscopy (07/25/2014)  Colonoscopy (07/25/2014)  Colonoscopy, flexible, proximal to splenic flexure; diagnostic, with or without collection of specimen(s) by brushing or washing, with or without colon decompression (separate procedure). (07/25/2014)  Appendectomy  Appendectomy;  Cataract  Hernia repair  Medications  amlodipine 5 mg oral tablet, 5 mg= 1 tab(s), Oral, Daily, 6 refills  aspirin 325 mg oral tablet, 325 mg= 1 tab(s), Oral, Daily, 6 refills  atorvastatin 20 mg oral tablet, 20 mg= 1 tab(s), Oral, Daily, 6 refills  atropine 1% ophthalmic solution, 1 drop(s), Eye-Left, QID  brimonidine ophthalmic 0.15% solution, 1 drop(s), Eye-Both, q8hr, 11 refills  Coreg 12.5 mg oral tablet, 12.5 mg= 1 tab(s), Oral, BID, 6 refills  dorzolamide 2% ophthalmic solution, 1 drop(s), Eye-Left, TID, 3 refills,? ?Not Taking per Prescriber  Dulcolax Stool Softener 100 mg oral capsule, 100 mg= 1 cap(s), Oral, Daily, PRN  Fish Oil oral capsule, 2 cap(s), Oral, Daily  ketorolac 10 mg oral tablet, See Instructions,? ?Not Taking, Completed  Rx  latanoprost 0.005% ophthalmic solution, 1 drop(s), Eye-Both, Once a day (at bedtime), 11 refills  meperidine, 50 mg= 1 mL, IV Slow, Titrate  methocarbamol 750 mg oral tablet, See Instructions,? ?Not taking  mitomycin 0.4 mg/mL Ophth. Soln. (0.04%), 5 mg= 12.5 mL, Intraocular, Once  moxifloxacin 0.5% ophthalmic solution, 1 drop(s), Eye-Left, QID  omeprazole 20 mg oral DR capsule, 20 mg= 1 cap(s), Oral, Daily  Pred Forte 1% ophthalmic suspension, 1 drop(s), Eye-Left, QID  prednisoLONE acetate 1% ophthalmic suspension, See Instructions  prednisONE 20 mg oral tablet, 20 mg= 1 tab(s), Oral, Daily  Sandoz Lisinopril HCT 12.5 mg-20 mg oral tablet, 2 tablets, Oral, Daily  Tobradex 0.3%-0.1% ophthalmic ointment, 0.5 in, Eye-Left, Once a day (at bedtime)  traMADol 50 mg oral tablet, 50 mg= 1 tab(s), Oral, q4hr, PRN,? ?Not taking  Vascepa 1 g oral capsule, 2 gm= 2 cap(s), Oral, BID, 6 refills,? ?Not taking  Versed, 5 mg= 1 mL, IV Slow, Titrate  Vitamin D3 2000 intl units oral tablet, 2000 IntUnit= 1 tab(s), Oral, Daily  Allergies  penicillins?(Passed out)  Social History  Alcohol - 2017  Current, Beer, 1-2 times per year  Employment/School - 2017  Unemployed  Exercise - Does not exercise, 2017  Home/Environment - 2017  Lives with Spouse, GRAND DAUGTHER . Living situation: Home/Independent. Alcohol abuse in household: No. Substance abuse in household: No. Smoker in household: No. Feels unsafe at home: No. Safe place to go: Yes. Family/Friends available for support: Yes. Concern for family members at home: No. Major illness in household: No.  Nutrition/Health - 2017  Regular, Caffeine intake amount: COFFEE. Wants to lose weight: No. Sleeping concerns: No. Feels highly stressed: No.  Substance Abuse - Denies Substance Abuse, 2017  Never  Tobacco - Denies Tobacco Use, 2017  Never smoker  Family History  : Mother and Father.  Heart disease.: Father.  Hypertension.: Mother and  Father.  Metastatic cancer: Sister.  Open heart surgery: Father.     [1]?IM Office Visit Note; Emily Yost MD 07/21/2017 09:53 CDT

## 2022-07-15 ENCOUNTER — OFFICE VISIT (OUTPATIENT)
Dept: CARDIOLOGY | Facility: CLINIC | Age: 67
End: 2022-07-15
Payer: MEDICARE

## 2022-07-15 VITALS
TEMPERATURE: 98 F | RESPIRATION RATE: 16 BRPM | HEART RATE: 80 BPM | DIASTOLIC BLOOD PRESSURE: 82 MMHG | WEIGHT: 263.88 LBS | SYSTOLIC BLOOD PRESSURE: 138 MMHG | OXYGEN SATURATION: 98 % | HEIGHT: 71 IN | BODY MASS INDEX: 36.94 KG/M2

## 2022-07-15 DIAGNOSIS — I10 PRIMARY HYPERTENSION: ICD-10-CM

## 2022-07-15 DIAGNOSIS — R07.89 OTHER CHEST PAIN: ICD-10-CM

## 2022-07-15 DIAGNOSIS — E78.5 HYPERLIPIDEMIA LDL GOAL <70: ICD-10-CM

## 2022-07-15 DIAGNOSIS — R06.09 DOE (DYSPNEA ON EXERTION): ICD-10-CM

## 2022-07-15 DIAGNOSIS — G47.33 OSA (OBSTRUCTIVE SLEEP APNEA): ICD-10-CM

## 2022-07-15 DIAGNOSIS — I25.118 CORONARY ARTERY DISEASE OF NATIVE ARTERY OF NATIVE HEART WITH STABLE ANGINA PECTORIS: Primary | ICD-10-CM

## 2022-07-15 PROCEDURE — 4010F ACE/ARB THERAPY RXD/TAKEN: CPT | Mod: CPTII,,, | Performed by: NURSE PRACTITIONER

## 2022-07-15 PROCEDURE — 3288F PR FALLS RISK ASSESSMENT DOCUMENTED: ICD-10-PCS | Mod: CPTII,,, | Performed by: NURSE PRACTITIONER

## 2022-07-15 PROCEDURE — 3008F BODY MASS INDEX DOCD: CPT | Mod: CPTII,,, | Performed by: NURSE PRACTITIONER

## 2022-07-15 PROCEDURE — 1159F MED LIST DOCD IN RCRD: CPT | Mod: CPTII,,, | Performed by: NURSE PRACTITIONER

## 2022-07-15 PROCEDURE — 1160F PR REVIEW ALL MEDS BY PRESCRIBER/CLIN PHARMACIST DOCUMENTED: ICD-10-PCS | Mod: CPTII,,, | Performed by: NURSE PRACTITIONER

## 2022-07-15 PROCEDURE — 1101F PR PT FALLS ASSESS DOC 0-1 FALLS W/OUT INJ PAST YR: ICD-10-PCS | Mod: CPTII,,, | Performed by: NURSE PRACTITIONER

## 2022-07-15 PROCEDURE — 1126F PR PAIN SEVERITY QUANTIFIED, NO PAIN PRESENT: ICD-10-PCS | Mod: CPTII,,, | Performed by: NURSE PRACTITIONER

## 2022-07-15 PROCEDURE — 99214 PR OFFICE/OUTPT VISIT, EST, LEVL IV, 30-39 MIN: ICD-10-PCS | Mod: S$PBB,,, | Performed by: NURSE PRACTITIONER

## 2022-07-15 PROCEDURE — 4010F PR ACE/ARB THEARPY RXD/TAKEN: ICD-10-PCS | Mod: CPTII,,, | Performed by: NURSE PRACTITIONER

## 2022-07-15 PROCEDURE — 3079F PR MOST RECENT DIASTOLIC BLOOD PRESSURE 80-89 MM HG: ICD-10-PCS | Mod: CPTII,,, | Performed by: NURSE PRACTITIONER

## 2022-07-15 PROCEDURE — 99214 OFFICE O/P EST MOD 30 MIN: CPT | Mod: PBBFAC | Performed by: NURSE PRACTITIONER

## 2022-07-15 PROCEDURE — 1160F RVW MEDS BY RX/DR IN RCRD: CPT | Mod: CPTII,,, | Performed by: NURSE PRACTITIONER

## 2022-07-15 PROCEDURE — 3008F PR BODY MASS INDEX (BMI) DOCUMENTED: ICD-10-PCS | Mod: CPTII,,, | Performed by: NURSE PRACTITIONER

## 2022-07-15 PROCEDURE — 1126F AMNT PAIN NOTED NONE PRSNT: CPT | Mod: CPTII,,, | Performed by: NURSE PRACTITIONER

## 2022-07-15 PROCEDURE — 3288F FALL RISK ASSESSMENT DOCD: CPT | Mod: CPTII,,, | Performed by: NURSE PRACTITIONER

## 2022-07-15 PROCEDURE — 3075F PR MOST RECENT SYSTOLIC BLOOD PRESS GE 130-139MM HG: ICD-10-PCS | Mod: CPTII,,, | Performed by: NURSE PRACTITIONER

## 2022-07-15 PROCEDURE — 3075F SYST BP GE 130 - 139MM HG: CPT | Mod: CPTII,,, | Performed by: NURSE PRACTITIONER

## 2022-07-15 PROCEDURE — 3079F DIAST BP 80-89 MM HG: CPT | Mod: CPTII,,, | Performed by: NURSE PRACTITIONER

## 2022-07-15 PROCEDURE — 1101F PT FALLS ASSESS-DOCD LE1/YR: CPT | Mod: CPTII,,, | Performed by: NURSE PRACTITIONER

## 2022-07-15 PROCEDURE — 99214 OFFICE O/P EST MOD 30 MIN: CPT | Mod: S$PBB,,, | Performed by: NURSE PRACTITIONER

## 2022-07-15 PROCEDURE — 1159F PR MEDICATION LIST DOCUMENTED IN MEDICAL RECORD: ICD-10-PCS | Mod: CPTII,,, | Performed by: NURSE PRACTITIONER

## 2022-07-15 RX ORDER — ATORVASTATIN CALCIUM 20 MG/1
20 TABLET, FILM COATED ORAL DAILY
Qty: 90 TABLET | Refills: 3 | Status: SHIPPED | OUTPATIENT
Start: 2022-07-15 | End: 2023-08-03 | Stop reason: SDUPTHER

## 2022-07-15 RX ORDER — ASPIRIN 81 MG/1
81 TABLET ORAL DAILY
Qty: 90 TABLET | Refills: 3
Start: 2022-07-15 | End: 2023-06-14 | Stop reason: SDUPTHER

## 2022-07-15 RX ORDER — GABAPENTIN 300 MG/1
300 CAPSULE ORAL DAILY
COMMUNITY
Start: 2022-04-24 | End: 2023-02-09

## 2022-07-15 RX ORDER — METHOCARBAMOL 750 MG/1
750 TABLET, FILM COATED ORAL 3 TIMES DAILY
COMMUNITY
Start: 2022-05-31

## 2022-07-15 RX ORDER — TAMSULOSIN HYDROCHLORIDE 0.4 MG/1
1 CAPSULE ORAL DAILY
COMMUNITY
Start: 2022-07-01

## 2022-07-15 RX ORDER — LISINOPRIL AND HYDROCHLOROTHIAZIDE 12.5; 2 MG/1; MG/1
1 TABLET ORAL DAILY
COMMUNITY
Start: 2022-06-27 | End: 2022-07-15 | Stop reason: SDUPTHER

## 2022-07-15 RX ORDER — DULOXETIN HYDROCHLORIDE 60 MG/1
60 CAPSULE, DELAYED RELEASE ORAL DAILY
COMMUNITY
Start: 2022-07-14

## 2022-07-15 RX ORDER — TRAZODONE HYDROCHLORIDE 50 MG/1
50 TABLET ORAL NIGHTLY
COMMUNITY
Start: 2022-07-05

## 2022-07-15 RX ORDER — NITROGLYCERIN 0.4 MG/1
TABLET SUBLINGUAL
COMMUNITY
Start: 2022-07-14 | End: 2022-11-15 | Stop reason: SDUPTHER

## 2022-07-15 RX ORDER — PANTOPRAZOLE SODIUM 40 MG/1
40 TABLET, DELAYED RELEASE ORAL DAILY
COMMUNITY
Start: 2022-06-27

## 2022-07-15 RX ORDER — ATORVASTATIN CALCIUM 20 MG/1
20 TABLET, FILM COATED ORAL DAILY
COMMUNITY
Start: 2022-07-07 | End: 2022-07-15 | Stop reason: SDUPTHER

## 2022-07-15 RX ORDER — CARVEDILOL 6.25 MG/1
6.25 TABLET ORAL 2 TIMES DAILY
Qty: 180 TABLET | Refills: 3 | Status: SHIPPED | OUTPATIENT
Start: 2022-07-15 | End: 2023-06-14 | Stop reason: SDUPTHER

## 2022-07-15 RX ORDER — BRINZOLAMIDE 10 MG/ML
1 SUSPENSION/ DROPS OPHTHALMIC
COMMUNITY
Start: 2021-07-30 | End: 2022-10-05 | Stop reason: SDUPTHER

## 2022-07-15 RX ORDER — CARVEDILOL 6.25 MG/1
TABLET ORAL
COMMUNITY
Start: 2021-09-01 | End: 2022-07-15 | Stop reason: SDUPTHER

## 2022-07-15 RX ORDER — LATANOPROST 50 UG/ML
1 SOLUTION/ DROPS OPHTHALMIC
COMMUNITY
Start: 2021-07-30 | End: 2022-08-05 | Stop reason: SDUPTHER

## 2022-07-15 RX ORDER — BRIMONIDINE TARTRATE 1.5 MG/ML
SOLUTION/ DROPS OPHTHALMIC
COMMUNITY
Start: 2022-01-18 | End: 2022-10-05 | Stop reason: SDUPTHER

## 2022-07-15 RX ORDER — LISINOPRIL AND HYDROCHLOROTHIAZIDE 12.5; 2 MG/1; MG/1
1 TABLET ORAL DAILY
Qty: 90 TABLET | Refills: 3 | Status: SHIPPED | OUTPATIENT
Start: 2022-07-15 | End: 2022-11-15 | Stop reason: SDUPTHER

## 2022-07-15 NOTE — PROGRESS NOTES
CHIEF COMPLAINT:   Chief Complaint   Patient presents with    Follow-up    Shortness of Breath    Chest Pain    Dizziness     Patient here for 3 mth f/u. Pt c/o cp midchest a mth ago. Pt c/o dizziness when standing. Pt c/o dyspnea while at rest and walking. Pt denies palpitations and headaches. No edema noted.                    Review of patient's allergies indicates:   Allergen Reactions    Penicillins      Other reaction(s): Passed out                                          HPI:  Doug Faye 66 y.o. male with a past medical history of nonobstructive CAD-[facility] in 2010 showing non-obstructive CAD (LAD with 20% stenosis), HLD, HTN, obesity, ANGELITA (not currently using a PAP device), and GERD presents for follow up and ongoing care.  Patient completed PFTs in July 2021 which were normal. He completed a Lexiscan stress test on 2/25/2021 which revealed inferior and lateral ischemia. He underwent a subsequent left heart cath on 3/15/2021 that revealed no angiographic evidence of obstructive CAD, only nonobstructive CAD with ectatic RCA. He completed an Echocardiogram on 4.14.21 that revealed an intact ejection fraction of 50 to 55%. (see full report below).     Patient continues to endorse occasional substernal chest pain that he describes as a stabbing sensation that occurs randomly.  He states the episodes are not as frequent as they were in the past.  He states his last episode was approximately 1 month ago and he denies use of sublingual nitroglycerin.  He continues to endorse stable dyspnea on exertion.  He denies palpitations, orthopnea, PND, peripheral edema, or syncope.  He continues to endorse excessive daytime fatigue and abnormal sleep pattern as well as snoring.  He was referred to the sleep lab in March 2021, but states he never received an appointment.  He is requesting another referral to the sleep lab.  He reports compliance with his medications. He continues to use a cane due to chronic back  pain.      PFT testing July 14, 2021:  Normal PFT    TTE 4.14.21  Left ventricular ejection fraction is measured approximately 50 to 55%  Structurally normal mitral valve  Structurally aortic valve is trileaflet  Tricuspid valve is structurally normal  There is mild tricuspid regurgitation with RVSP estimated at 34 mmHg  Pulmonic valve is grossly normal  Pulmonic regurgitation present  Mildly dilated left atrium  Left ventricular ejection fraction is measured approximately 50 to 55%  Mildly dilated right atrium  Normal right ventricular size with preserved RV function    Lexiscan stress test 2/25/2021:  Inferior and lateral ischemia  No scar    Echocardiogram January 24, 2017:  Normal left ventricular cavity with overall normal systolic function, EF measured at approximately 59%    Echocardiogram April 25, 2016:  Normal left ventricular cavity with overall normal systolic function-assessed at 63%. Mild MR. Normal aortic valve. Mild TR. Trace pulmonic regurgitation is present. Mildly dilated left atrium. E/A flow reversal noted. Suggestive of diastolic dysfunction. Mildly enlarged right atrium size. Normal right ventricular size with preserved RV function. No evidence of significant pericardial effusion is noted.    Lexiscan 2/23/16  Impression:  Normal myocardial perfusion study. Negative for ischemia. Mildly  reduced LVEF of 46%                                                                                                                                                                                                                                                                                    Patient Active Problem List   Diagnosis    Coronary artery disease of native artery of native heart with stable angina pectoris    Hyperlipidemia LDL goal <70    Primary hypertension    Other chest pain    QUIROZ (dyspnea on exertion)    ANGELITA (obstructive sleep apnea)     Past Surgical History:   Procedure Laterality  Date    APPENDECTOMY      blebectomy      COLONOSCOPY      HERNIA REPAIR       Social History     Socioeconomic History    Marital status:    Tobacco Use    Smoking status: Never Smoker    Smokeless tobacco: Never Used   Substance and Sexual Activity    Alcohol use: Not Currently    Drug use: Never        Family History   Problem Relation Age of Onset    Hypertension Mother     Heart failure Father     Hypertension Father     Heart failure Sister     Hypertension Sister     Heart attack Sister     Stroke Brother          Current Outpatient Medications:     brimonidine 0.15 % OPTH DROP (ALPHAGAN) 0.15 % ophthalmic solution, Place into both eyes., Disp: , Rfl:     brinzolamide (AZOPT) 1 % ophthalmic suspension, Apply 1 drop to eye., Disp: , Rfl:     DULoxetine (CYMBALTA) 60 MG capsule, Take 60 mg by mouth once daily., Disp: , Rfl:     gabapentin (NEURONTIN) 300 MG capsule, Take 300 mg by mouth once daily., Disp: , Rfl:     latanoprost 0.005 % ophthalmic solution, Apply 1 drop to eye., Disp: , Rfl:     methocarbamoL (ROBAXIN) 750 MG Tab, Take 750 mg by mouth 3 (three) times daily., Disp: , Rfl:     nitroGLYCERIN (NITROSTAT) 0.4 MG SL tablet, Place under the tongue., Disp: , Rfl:     pantoprazole (PROTONIX) 40 MG tablet, Take 40 mg by mouth once daily., Disp: , Rfl:     tamsulosin (FLOMAX) 0.4 mg Cap, Take 1 capsule by mouth once daily., Disp: , Rfl:     traZODone (DESYREL) 50 MG tablet, Take 50 mg by mouth nightly., Disp: , Rfl:     aspirin (ECOTRIN) 81 MG EC tablet, Take 1 tablet (81 mg total) by mouth once daily., Disp: 90 tablet, Rfl: 3    atorvastatin (LIPITOR) 20 MG tablet, Take 1 tablet (20 mg total) by mouth once daily., Disp: 90 tablet, Rfl: 3    carvediloL (COREG) 6.25 MG tablet, Take 1 tablet (6.25 mg total) by mouth 2 (two) times daily., Disp: 180 tablet, Rfl: 3    lisinopriL-hydrochlorothiazide (PRINZIDE,ZESTORETIC) 20-12.5 mg per tablet, Take 1 tablet by mouth once  "daily., Disp: 90 tablet, Rfl: 3     ROS:                                                                                                                                                                             Review of Systems   Constitutional: Negative.    Eyes:        Visual impairment   Respiratory: Positive for shortness of breath.    Cardiovascular: Positive for chest pain.   Gastrointestinal: Negative.    Musculoskeletal: Positive for back pain.   Skin: Negative.    Neurological: Positive for dizziness.   Psychiatric/Behavioral: Negative.         Blood pressure 138/82, pulse 80, temperature 98.1 °F (36.7 °C), resp. rate 16, height 5' 10.98" (1.803 m), weight 119.7 kg (263 lb 14.3 oz), SpO2 98 %.   PE:  Physical Exam  Constitutional:       Appearance: Normal appearance.   HENT:      Head: Normocephalic.   Eyes:      Extraocular Movements: Extraocular movements intact.   Cardiovascular:      Rate and Rhythm: Normal rate and regular rhythm.   Pulmonary:      Effort: Pulmonary effort is normal.   Abdominal:      Palpations: Abdomen is soft.   Musculoskeletal:         General: Normal range of motion.      Cervical back: Neck supple.   Skin:     General: Skin is warm and dry.   Neurological:      General: No focal deficit present.      Mental Status: He is alert and oriented to person, place, and time.   Psychiatric:         Mood and Affect: Mood normal.       ASSESSMENT/PLAN:  Nonobstructive CAD  [facility] - 3/15/2021 that revealed no angiographic evidence of obstructive CAD, only nonobstructive CAD with ectatic RCA (3.15.21)  LVEF -50-55% per TTE 4.14.21  Lexiscan Stress Test 2.25.21: Inferior and lateral ischemia  Reports improvement in CP symptoms - last episode last month  Continue aspirin (decrease to 81mg daily), carvedilol, lisinopril, atorvastatin, and SL Nitro prn CP  Counseled on the importance of consuming a heart healthy diet     QUIROZ - stable   LVEF- 50-55% per TTE 4.14.21  PFTs July 14, 2021 - " normal    HTN  BP at goal - 138/82   Continue current therapy   Counseled on low salt diet     GERD  Management per PCP    ANGELITA  He endorses daytime fatigue/sleepiness, snoring at night, and abnormal sleep pattern   Will send another referral to sleep lab     Hypercholesterolemia  LDL at goal - 40  Continue atorvastatin 20 mg by mouth daily at bedtime  Continue Fish Oil BID  Will repeat FLP at next visit  Counseled on the importance of obtaining a healthy BMI, and consuming a heart healthy, low-cholesterol diet    Chronic Back Pain  Continue with pain management as directed    Refer to sleep lab  [id number] in 4 months  Follow up in Cardiology Clinic in 4 months

## 2022-07-19 ENCOUNTER — HOSPITAL ENCOUNTER (OUTPATIENT)
Dept: RADIOLOGY | Facility: HOSPITAL | Age: 67
Discharge: HOME | End: 2022-07-19
Payer: MEDICARE

## 2022-07-19 DIAGNOSIS — M50.30 DDD (DEGENERATIVE DISC DISEASE), CERVICAL: ICD-10-CM

## 2022-07-19 DIAGNOSIS — G47.33 OSA ON CPAP: Primary | ICD-10-CM

## 2022-07-19 PROCEDURE — 72040 X-RAY EXAM NECK SPINE 2-3 VW: CPT | Mod: TC

## 2022-07-27 DIAGNOSIS — I70.218: Primary | ICD-10-CM

## 2022-08-05 ENCOUNTER — DOCUMENTATION ONLY (OUTPATIENT)
Dept: CARDIOLOGY | Facility: CLINIC | Age: 67
End: 2022-08-05
Payer: MEDICARE

## 2022-08-05 DIAGNOSIS — H40.9 GLAUCOMA, UNSPECIFIED GLAUCOMA TYPE, UNSPECIFIED LATERALITY: Primary | ICD-10-CM

## 2022-08-05 NOTE — PROGRESS NOTES
According to the revised cardiac risk index (Kody Criteria), patient is at < 1% risk for cardiac events for intermediate risk anal fissure surgery.  Okay to hold Aspirin for 5 days prior to surgery, but should resume when safe from a surgical standpoint.

## 2022-08-08 RX ORDER — LATANOPROST 50 UG/ML
1 SOLUTION/ DROPS OPHTHALMIC DAILY
Qty: 5 ML | Refills: 4 | Status: SHIPPED | OUTPATIENT
Start: 2022-08-08 | End: 2022-10-05 | Stop reason: SDUPTHER

## 2022-08-10 DIAGNOSIS — M54.2 CERVICALGIA: Primary | ICD-10-CM

## 2022-08-11 ENCOUNTER — OFFICE VISIT (OUTPATIENT)
Dept: OPHTHALMOLOGY | Facility: CLINIC | Age: 67
End: 2022-08-11
Payer: MEDICARE

## 2022-08-11 VITALS — BODY MASS INDEX: 37.56 KG/M2 | HEIGHT: 70 IN | WEIGHT: 262.38 LBS

## 2022-08-11 DIAGNOSIS — H04.123 DRY EYE SYNDROME OF BOTH EYES: ICD-10-CM

## 2022-08-11 DIAGNOSIS — H40.1123 PRIMARY OPEN ANGLE GLAUCOMA (POAG) OF LEFT EYE, SEVERE STAGE: Primary | ICD-10-CM

## 2022-08-11 DIAGNOSIS — H40.1111 PRIMARY OPEN ANGLE GLAUCOMA (POAG) OF RIGHT EYE, MILD STAGE: ICD-10-CM

## 2022-08-11 PROCEDURE — 99213 OFFICE O/P EST LOW 20 MIN: CPT | Mod: PBBFAC,PO

## 2022-08-11 NOTE — PROGRESS NOTES
Testing    Tmax 28//38  //565    OCT RNFL 12/22/20  OD - 80, yellow S, remainder green  OS - poor quality, red S  OCT RNFL: 3/2017: 77//55 ( S, I red, T borderline, N green)  OCT RNFL: 1/2019 OD: 81 yellow S, otherwise green // unable OS    Gonio: 9/29/2020  OD: Open to   OS: Open to SS/  Gonio 12/2014: Open OU    HVF 24-2: 9/29/2020  OD: 1/10 losses, reliable, early superior arcuate defect  OS: 0/11 losses, total defect  HVF (2/18/15) -- Full OD/IAD OS  HVF 24-2 (3/2017): Full OD/ IAD +progression OS  HVF 24-2 5/13/19 OD only: full and reliable    B-scan OS 7/24/20 - flat and attached, no tears/detachments appreciated Assessment/Plan POAG (primary open-angle glaucoma) H40.1190   Ordered:   Clinic Follow up, *Est. 04/21/22 12:30:00 CDT, Order for future visit, POAG (primary open-angle glaucoma), [facility]     HVF 12/23/21:  OD: few scattered superior defects, grossly full   OS: generalized depression    OCT RNFL 12/23/21:  OD: 74, Red S, Yel I, green N/T  OS: poor quality scan    Assessment/Plan    1. s/p CEIOL + AGV OS 10/29/2020  - tapered (stopped) PF starting 4/22/21 for AC reaction still seen at 12/22/20  - AC rxn today: trace cell OD, no flare  - Continue massage of left eye 3-4x/day for 3-4 seconds    2. Severe POAG OS  - s/p trab with multiple revisions, Due to persistent bleb leak with resultant hypotony with maculopathy s/p trab (11/18/17), trab revision x4 (2/21/18, 2/26/18, 4/3/18, 5/29/18)  - Timolol was discontinued as patient reports it gave him arrhythmias, so will not attempt trial of Timolol    3. Primary Open Angle Glaucoma OD, mild  - IOP stable; in fact likely not glaucomatous due to normal CD and IOP, although high risk  - Continue Alphagan and Brinzolamide TID, Latanoprost QHS  - Good IOP today - can consider SLT in the future if becomes uncontrolled  - 12/23/21: HVF and OCT stable    4. Ptosis OS  - Pt states that he only has noticed it since the surgery  - MRD1 0.5  OS  - Refer to Dr. Boyle after resolution of OS surgery    5. PCO s/p PCIOL OD  - doing well with lens  - PCO inf/nasal    6. Dry Eye Syndrome  - Continue AT 4-6 times a day, ointment at night PRN, WC/LS  - Severe limiting factor to refraction today; very unreliable refraction; not giving prescription  - Excess glaucoma drops in right eye may be contributing factor    7. Optic atrophy OS  - May be secondary to the hypotony in 2017/2018    DW Dr. Siddiqui    RTC 4 months OCT RNFL and HVF

## 2022-08-20 ENCOUNTER — PROCEDURE VISIT (OUTPATIENT)
Dept: SLEEP MEDICINE | Facility: HOSPITAL | Age: 67
End: 2022-08-20
Attending: NURSE PRACTITIONER
Payer: MEDICARE

## 2022-08-20 DIAGNOSIS — G47.33 OSA ON CPAP: ICD-10-CM

## 2022-09-07 ENCOUNTER — HOSPITAL ENCOUNTER (OUTPATIENT)
Dept: RADIOLOGY | Facility: HOSPITAL | Age: 67
Discharge: HOME | End: 2022-09-07
Attending: NURSE PRACTITIONER
Payer: MEDICARE

## 2022-09-07 DIAGNOSIS — I70.218: ICD-10-CM

## 2022-09-07 PROCEDURE — 93922 UPR/L XTREMITY ART 2 LEVELS: CPT

## 2022-09-09 LAB
LEFT ABI: 1.1
LEFT ARM BP: 133 MMHG
LEFT DORSALIS PEDIS: 135 MMHG
LEFT POSTERIOR TIBIAL: 146 MMHG
LEFT TBI: 0.81
LEFT TOE PRESSURE: 108 MMHG
RIGHT ABI: 1.17
RIGHT ARM BP: 127 MMHG
RIGHT DORSALIS PEDIS: 138 MMHG
RIGHT POSTERIOR TIBIAL: 156 MMHG
RIGHT TBI: 1.1
RIGHT TOE PRESSURE: 146 MMHG

## 2022-09-12 DIAGNOSIS — G47.33 OSA ON CPAP: Primary | ICD-10-CM

## 2022-10-05 ENCOUNTER — TELEPHONE (OUTPATIENT)
Dept: OPHTHALMOLOGY | Facility: CLINIC | Age: 67
End: 2022-10-05
Payer: MEDICARE

## 2022-10-05 DIAGNOSIS — H40.1123 PRIMARY OPEN ANGLE GLAUCOMA (POAG) OF LEFT EYE, SEVERE STAGE: Primary | ICD-10-CM

## 2022-10-05 DIAGNOSIS — H40.9 GLAUCOMA, UNSPECIFIED GLAUCOMA TYPE, UNSPECIFIED LATERALITY: ICD-10-CM

## 2022-10-05 RX ORDER — BRINZOLAMIDE 10 MG/ML
1 SUSPENSION/ DROPS OPHTHALMIC 3 TIMES DAILY
Qty: 10 ML | Refills: 5 | Status: SHIPPED | OUTPATIENT
Start: 2022-10-05 | End: 2024-01-11 | Stop reason: SDUPTHER

## 2022-10-05 RX ORDER — LATANOPROST 50 UG/ML
1 SOLUTION/ DROPS OPHTHALMIC DAILY
Qty: 2.5 ML | Refills: 5 | Status: SHIPPED | OUTPATIENT
Start: 2022-10-05 | End: 2024-01-11 | Stop reason: SDUPTHER

## 2022-10-05 RX ORDER — BRIMONIDINE TARTRATE 1.5 MG/ML
1 SOLUTION/ DROPS OPHTHALMIC 3 TIMES DAILY
Qty: 10 ML | Refills: 5 | Status: SHIPPED | OUTPATIENT
Start: 2022-10-05 | End: 2023-10-03 | Stop reason: SDUPTHER

## 2022-10-05 NOTE — TELEPHONE ENCOUNTER
----- Message from Esther Tobias sent at 10/5/2022  1:26 PM CDT -----  Regarding: Refill  Contact: [phone]  [facility] pharmacy called for a refill for eyedrops.  [facility] - [phone]    Thank you :)

## 2022-10-18 ENCOUNTER — PROCEDURE VISIT (OUTPATIENT)
Dept: SLEEP MEDICINE | Facility: HOSPITAL | Age: 67
End: 2022-10-18
Attending: NURSE PRACTITIONER
Payer: MEDICARE

## 2022-10-18 DIAGNOSIS — G47.33 OSA ON CPAP: ICD-10-CM

## 2022-10-18 PROCEDURE — 95811 POLYSOM 6/>YRS CPAP 4/> PARM: CPT

## 2022-11-04 ENCOUNTER — HOSPITAL ENCOUNTER (EMERGENCY)
Facility: HOSPITAL | Age: 67
Discharge: HOME | End: 2022-11-04
Attending: EMERGENCY MEDICINE
Payer: MEDICARE

## 2022-11-04 VITALS
SYSTOLIC BLOOD PRESSURE: 147 MMHG | RESPIRATION RATE: 18 BRPM | OXYGEN SATURATION: 98 % | DIASTOLIC BLOOD PRESSURE: 83 MMHG | HEIGHT: 71 IN | BODY MASS INDEX: 34.72 KG/M2 | WEIGHT: 248 LBS | TEMPERATURE: 98 F | HEART RATE: 82 BPM

## 2022-11-04 DIAGNOSIS — K52.9 GASTROENTERITIS: Primary | ICD-10-CM

## 2022-11-04 DIAGNOSIS — R19.7 DIARRHEA: ICD-10-CM

## 2022-11-04 LAB
ALBUMIN SERPL-MCNC: 3.9 GM/DL (ref 3.4–4.8)
ALBUMIN/GLOB SERPL: 1 RATIO (ref 1.1–2)
ALP SERPL-CCNC: 78 UNIT/L (ref 40–150)
ALT SERPL-CCNC: 23 UNIT/L (ref 0–55)
APPEARANCE UR: CLEAR
AST SERPL-CCNC: 26 UNIT/L (ref 5–34)
BACTERIA #/AREA URNS AUTO: ABNORMAL /HPF
BASOPHILS # BLD AUTO: 0.02 X10(3)/MCL (ref 0–0.2)
BASOPHILS NFR BLD AUTO: 0.2 %
BILIRUB UR QL STRIP.AUTO: NEGATIVE MG/DL
BILIRUBIN DIRECT+TOT PNL SERPL-MCNC: 1 MG/DL
BUN SERPL-MCNC: 15.1 MG/DL (ref 8.4–25.7)
CALCIUM SERPL-MCNC: 9.9 MG/DL (ref 8.8–10)
CHLORIDE SERPL-SCNC: 105 MMOL/L (ref 98–107)
CO2 SERPL-SCNC: 24 MMOL/L (ref 23–31)
COLOR UR AUTO: YELLOW
CREAT SERPL-MCNC: 1.1 MG/DL (ref 0.73–1.18)
EOSINOPHIL # BLD AUTO: 0.31 X10(3)/MCL (ref 0–0.9)
EOSINOPHIL NFR BLD AUTO: 2.6 %
ERYTHROCYTE [DISTWIDTH] IN BLOOD BY AUTOMATED COUNT: 12.8 % (ref 11.5–17)
GFR SERPLBLD CREATININE-BSD FMLA CKD-EPI: >60 MLS/MIN/1.73/M2
GLOBULIN SER-MCNC: 4.1 GM/DL (ref 2.4–3.5)
GLUCOSE SERPL-MCNC: 85 MG/DL (ref 82–115)
GLUCOSE UR QL STRIP.AUTO: NORMAL MG/DL
HCT VFR BLD AUTO: 48.6 % (ref 42–52)
HGB BLD-MCNC: 16.9 GM/DL (ref 14–18)
HYALINE CASTS #/AREA URNS LPF: ABNORMAL /LPF
IMM GRANULOCYTES # BLD AUTO: 0.05 X10(3)/MCL (ref 0–0.04)
IMM GRANULOCYTES NFR BLD AUTO: 0.4 %
KETONES UR QL STRIP.AUTO: NEGATIVE MG/DL
LEUKOCYTE ESTERASE UR QL STRIP.AUTO: NEGATIVE UNIT/L
LYMPHOCYTES # BLD AUTO: 2.15 X10(3)/MCL (ref 0.6–4.6)
LYMPHOCYTES NFR BLD AUTO: 17.8 %
MCH RBC QN AUTO: 31.1 PG (ref 27–31)
MCHC RBC AUTO-ENTMCNC: 34.8 MG/DL (ref 33–36)
MCV RBC AUTO: 89.5 FL (ref 80–94)
MONOCYTES # BLD AUTO: 1.45 X10(3)/MCL (ref 0.1–1.3)
MONOCYTES NFR BLD AUTO: 12 %
MUCOUS THREADS URNS QL MICRO: ABNORMAL /LPF
NEUTROPHILS # BLD AUTO: 8.1 X10(3)/MCL (ref 2.1–9.2)
NEUTROPHILS NFR BLD AUTO: 67 %
NITRITE UR QL STRIP.AUTO: NEGATIVE
NRBC BLD AUTO-RTO: 0 %
PH UR STRIP.AUTO: 5.5 [PH]
PLATELET # BLD AUTO: 363 X10(3)/MCL (ref 130–400)
PMV BLD AUTO: 8.9 FL (ref 7.4–10.4)
POTASSIUM SERPL-SCNC: 3.4 MMOL/L (ref 3.5–5.1)
PROT SERPL-MCNC: 8 GM/DL (ref 5.8–7.6)
PROT UR QL STRIP.AUTO: ABNORMAL MG/DL
RBC # BLD AUTO: 5.43 X10(6)/MCL (ref 4.7–6.1)
RBC #/AREA URNS AUTO: ABNORMAL /HPF
RBC UR QL AUTO: NEGATIVE UNIT/L
SODIUM SERPL-SCNC: 141 MMOL/L (ref 136–145)
SP GR UR STRIP.AUTO: 1.03
SQUAMOUS #/AREA URNS LPF: ABNORMAL /HPF
UROBILINOGEN UR STRIP-ACNC: NORMAL MG/DL
WBC # SPEC AUTO: 12.1 X10(3)/MCL (ref 4.5–11.5)
WBC #/AREA URNS AUTO: ABNORMAL /HPF

## 2022-11-04 PROCEDURE — 99284 EMERGENCY DEPT VISIT MOD MDM: CPT | Mod: 25

## 2022-11-04 PROCEDURE — 96361 HYDRATE IV INFUSION ADD-ON: CPT

## 2022-11-04 PROCEDURE — 96374 THER/PROPH/DIAG INJ IV PUSH: CPT

## 2022-11-04 PROCEDURE — 85025 COMPLETE CBC W/AUTO DIFF WBC: CPT | Performed by: NURSE PRACTITIONER

## 2022-11-04 PROCEDURE — 96372 THER/PROPH/DIAG INJ SC/IM: CPT | Performed by: NURSE PRACTITIONER

## 2022-11-04 PROCEDURE — 80053 COMPREHEN METABOLIC PANEL: CPT | Performed by: NURSE PRACTITIONER

## 2022-11-04 PROCEDURE — 81001 URINALYSIS AUTO W/SCOPE: CPT | Performed by: NURSE PRACTITIONER

## 2022-11-04 PROCEDURE — 25000003 PHARM REV CODE 250: Performed by: NURSE PRACTITIONER

## 2022-11-04 PROCEDURE — 63600175 PHARM REV CODE 636 W HCPCS: Performed by: NURSE PRACTITIONER

## 2022-11-04 RX ORDER — METOCLOPRAMIDE 10 MG/1
10 TABLET ORAL EVERY 6 HOURS PRN
Qty: 16 TABLET | Refills: 0 | Status: SHIPPED | OUTPATIENT
Start: 2022-11-04 | End: 2022-11-04 | Stop reason: ALTCHOICE

## 2022-11-04 RX ORDER — LOPERAMIDE HYDROCHLORIDE 2 MG/1
2 CAPSULE ORAL 4 TIMES DAILY PRN
Qty: 20 CAPSULE | Refills: 0 | Status: SHIPPED | OUTPATIENT
Start: 2022-11-04 | End: 2022-11-14

## 2022-11-04 RX ORDER — ONDANSETRON 4 MG/1
4 TABLET, ORALLY DISINTEGRATING ORAL EVERY 8 HOURS PRN
Qty: 9 TABLET | Refills: 0 | Status: SHIPPED | OUTPATIENT
Start: 2022-11-04 | End: 2022-11-07

## 2022-11-04 RX ORDER — ONDANSETRON 2 MG/ML
4 INJECTION INTRAMUSCULAR; INTRAVENOUS
Status: COMPLETED | OUTPATIENT
Start: 2022-11-04 | End: 2022-11-04

## 2022-11-04 RX ORDER — METOCLOPRAMIDE HYDROCHLORIDE 5 MG/ML
10 INJECTION INTRAMUSCULAR; INTRAVENOUS
Status: COMPLETED | OUTPATIENT
Start: 2022-11-04 | End: 2022-11-04

## 2022-11-04 RX ADMIN — METOCLOPRAMIDE 10 MG: 5 INJECTION, SOLUTION INTRAMUSCULAR; INTRAVENOUS at 02:11

## 2022-11-04 RX ADMIN — ONDANSETRON 4 MG: 2 INJECTION INTRAMUSCULAR; INTRAVENOUS at 04:11

## 2022-11-04 RX ADMIN — SODIUM CHLORIDE 1000 ML: 9 INJECTION, SOLUTION INTRAVENOUS at 04:11

## 2022-11-04 NOTE — DISCHARGE INSTRUCTIONS
Increase fluid intake, clear liquids x 12 hours. Advance diet slowly.   Increase fiber in your diet.  Follow up with your primary care physician in 3-5 days for follow up evaluation.

## 2022-11-04 NOTE — ED PROVIDER NOTES
Encounter Date: 11/4/2022       History     Chief Complaint   Patient presents with    Diarrhea     Diarrhea for 3 weeks     Pt is a 66 y.o. male who presents to the [facility] ED complaining of abdominal cramping, nausea, and diarrhea. Symptoms x 1 week per pt. Denies chest pain, SOB, fever, cough, or loss of bowel or bladder control. Pt with HX of GERD, Anxiety, and CAD. Voicing concerns that he may have contracted food poisoning after eating reheated food prior to start of symptoms.    Review of patient's allergies indicates:   Allergen Reactions    Penicillins      Other reaction(s): Passed out     Past Medical History:   Diagnosis Date    Acid reflux     Anxiety     Coronary artery disease     Glaucoma     Hyperlipidemia     Hypertension     Obesity     Panic attacks     Sleep apnea     Vision impairment      Past Surgical History:   Procedure Laterality Date    APPENDECTOMY      blebectomy      COLONOSCOPY      HERNIA REPAIR       Family History   Problem Relation Age of Onset    Hypertension Mother     Heart failure Father     Hypertension Father     Heart failure Sister     Hypertension Sister     Heart attack Sister     Stroke Brother      Social History     Tobacco Use    Smoking status: Never    Smokeless tobacco: Never   Substance Use Topics    Alcohol use: Not Currently    Drug use: Never     Review of Systems   Constitutional:  Negative for chills, diaphoresis, fatigue and fever.   HENT:  Negative for facial swelling, postnasal drip, rhinorrhea, sinus pressure, sinus pain, sore throat and trouble swallowing.    Respiratory:  Negative for cough, chest tightness, shortness of breath and wheezing.    Cardiovascular:  Negative for chest pain, palpitations and leg swelling.   Gastrointestinal:  Positive for abdominal pain, diarrhea and nausea. Negative for vomiting.   Genitourinary:  Negative for dysuria, flank pain, hematuria and urgency.   Musculoskeletal:  Negative for arthralgias, back pain and myalgias.    Skin:  Negative for color change and rash.   Neurological:  Negative for dizziness, syncope, weakness and headaches.   Hematological:  Does not bruise/bleed easily.   All other systems reviewed and are negative.    Physical Exam     Initial Vitals [11/04/22 1259]   BP Pulse Resp Temp SpO2   (!) 154/90 78 18 98.1 °F (36.7 °C) 98 %      MAP       --         Physical Exam    Nursing note and vitals reviewed.  Constitutional: Vital signs are normal. He appears well-developed and well-nourished.   HENT:   Head: Normocephalic.   Nose: Nose normal.   Mouth/Throat: Oropharynx is clear and moist.   Eyes: Conjunctivae and EOM are normal. Pupils are equal, round, and reactive to light.   Neck: Neck supple.   Normal range of motion.  Cardiovascular:  Normal rate, regular rhythm, normal heart sounds and intact distal pulses.           Pulmonary/Chest: Effort normal and breath sounds normal. No respiratory distress. He has no wheezes. He has no rhonchi. He has no rales. He exhibits no tenderness.   Abdominal: Abdomen is soft and flat. Bowel sounds are normal. There is generalized abdominal tenderness. There is no rebound, no guarding, no tenderness at McBurney's point and negative Blanchard's sign.   Musculoskeletal:         General: Normal range of motion.      Cervical back: Normal range of motion and neck supple.     Neurological: He is alert and oriented to person, place, and time. He has normal strength.   Skin: Skin is warm and dry. Capillary refill takes less than 2 seconds.   Psychiatric: He has a normal mood and affect. His behavior is normal. Judgment and thought content normal.       ED Course   Procedures  Labs Reviewed   COMPREHENSIVE METABOLIC PANEL - Abnormal; Notable for the following components:       Result Value    Potassium Level 3.4 (*)     Protein Total 8.0 (*)     Globulin 4.1 (*)     Albumin/Globulin Ratio 1.0 (*)     All other components within normal limits   URINALYSIS, REFLEX TO URINE CULTURE -  Abnormal; Notable for the following components:    Protein, UA Trace (*)     Mucous, UA Trace (*)     All other components within normal limits   CBC WITH DIFFERENTIAL - Abnormal; Notable for the following components:    WBC 12.1 (*)     MCH 31.1 (*)     Mono # 1.45 (*)     IG# 0.05 (*)     All other components within normal limits   CBC W/ AUTO DIFFERENTIAL    Narrative:     The following orders were created for panel order CBC auto differential.  Procedure                               Abnormality         Status                     ---------                               -----------         ------                     CBC with Differential[[id number]]        Abnormal            Final result                 Please view results for these tests on the individual orders.   EXTRA TUBES    Narrative:     The following orders were created for panel order EXTRA TUBES.  Procedure                               Abnormality         Status                     ---------                               -----------         ------                     Light Blue Top Hold[[id number]]                              In process                 Red Top Hold[[id number]]                                     In process                   Please view results for these tests on the individual orders.   LIGHT BLUE TOP HOLD   RED TOP HOLD          Imaging Results              X-Ray Abdomen Flat And Erect (Final result)  Result time 11/04/22 14:24:45      Final result by Juan Flood MD (11/04/22 14:24:45)                   Impression:      Air-fluid levels on upright imaging likely represent mild ileus or enteritis.      Electronically signed by: Juan Flood  Date:    11/04/2022  Time:    14:24               Narrative:    EXAMINATION:  XR ABDOMEN FLAT AND ERECT    CLINICAL HISTORY:  Diarrhea, unspecified    TECHNIQUE:  Flat and upright imaging of the abdomen    COMPARISON:  None    FINDINGS:  Air-fluid levels on upright imaging likely represent  mild ileus or enteritis.                                       Medications   sodium chloride 0.9% bolus 1,000 mL (1,000 mLs Intravenous New Bag 11/4/22 1604)   metoclopramide HCl injection 10 mg (10 mg Intramuscular Given 11/4/22 1445)   ondansetron injection 4 mg (4 mg Intravenous Given 11/4/22 1621)     Medical Decision Making:   Differential Diagnosis:   Gastroenteritis  Diarrhea  Clinical Tests:   Lab Tests: Ordered and Reviewed  Radiological Study: Reviewed and Ordered  ED Management:  4:26 PM Reassessed patient at this time. Reports condition has improved. Discussed with patient all pertinent ED information and results. Discussed diagnosis and treatment plan with patient. Follow up instructions and return to ED instruction have been given. All questions and concerns were addressed at this time. Patient voices understanding of information and instructions. Patient is comfortable with plan and discharge. Patient is stable for discharge.                           Clinical Impression:   Final diagnoses:  [R19.7] Diarrhea  [K52.9] Gastroenteritis (Primary)        ED Disposition Condition    Discharge Stable          ED Prescriptions       Medication Sig Dispense Start Date End Date Auth. Provider    metoclopramide HCl (REGLAN) 10 MG tablet  (Status: Discontinued) Take 1 tablet (10 mg total) by mouth every 6 (six) hours as needed (nausea). 16 tablet 11/4/2022 11/4/2022 SHAN Schrader Jr.    loperamide (IMODIUM) 2 mg capsule Take 1 capsule (2 mg total) by mouth 4 (four) times daily as needed for Diarrhea. 20 capsule 11/4/2022 11/14/2022 SHAN Schrader Jr.    ondansetron (ZOFRAN-ODT) 4 MG TbDL Take 1 tablet (4 mg total) by mouth every 8 (eight) hours as needed (Nausea). 9 tablet 11/4/2022 11/7/2022 SHAN Schrader Jr.          Follow-up Information       Follow up With Specialties Details Why Contact [facility] [place] Sleep Medicine In 1 week  [address] [facility]  [place] [address]  [phone]      [facility] Emergency Medicine In 3 days As needed, If symptoms worsen [address] [facility] [address] [phone]             Mathew Phillips Jr., FNP  11/04/22 5052

## 2022-11-13 ENCOUNTER — HOSPITAL ENCOUNTER (EMERGENCY)
Facility: HOSPITAL | Age: 67
Discharge: HOME | End: 2022-11-13
Attending: STUDENT IN AN ORGANIZED HEALTH CARE EDUCATION/TRAINING PROGRAM
Payer: MEDICARE

## 2022-11-13 VITALS
TEMPERATURE: 98 F | RESPIRATION RATE: 18 BRPM | BODY MASS INDEX: 34.59 KG/M2 | HEART RATE: 75 BPM | OXYGEN SATURATION: 97 % | DIASTOLIC BLOOD PRESSURE: 85 MMHG | SYSTOLIC BLOOD PRESSURE: 146 MMHG | WEIGHT: 248 LBS

## 2022-11-13 DIAGNOSIS — K52.9 COLITIS: Primary | ICD-10-CM

## 2022-11-13 DIAGNOSIS — K85.90 ACUTE PANCREATITIS, UNSPECIFIED COMPLICATION STATUS, UNSPECIFIED PANCREATITIS TYPE: ICD-10-CM

## 2022-11-13 DIAGNOSIS — R11.2 NAUSEA AND VOMITING, UNSPECIFIED VOMITING TYPE: ICD-10-CM

## 2022-11-13 DIAGNOSIS — R19.7 DIARRHEA, UNSPECIFIED TYPE: ICD-10-CM

## 2022-11-13 LAB
ALBUMIN SERPL-MCNC: 3.3 GM/DL (ref 3.4–4.8)
ALBUMIN/GLOB SERPL: 1.1 RATIO (ref 1.1–2)
ALP SERPL-CCNC: 70 UNIT/L (ref 40–150)
ALT SERPL-CCNC: 26 UNIT/L (ref 0–55)
APPEARANCE UR: CLEAR
AST SERPL-CCNC: 28 UNIT/L (ref 5–34)
BACTERIA #/AREA URNS AUTO: ABNORMAL /HPF
BASOPHILS # BLD AUTO: 0.03 X10(3)/MCL (ref 0–0.2)
BASOPHILS NFR BLD AUTO: 0.3 %
BILIRUB UR QL STRIP.AUTO: NEGATIVE MG/DL
BILIRUBIN DIRECT+TOT PNL SERPL-MCNC: 0.5 MG/DL
BUN SERPL-MCNC: 8.7 MG/DL (ref 8.4–25.7)
CALCIUM SERPL-MCNC: 9.2 MG/DL (ref 8.8–10)
CHLORIDE SERPL-SCNC: 109 MMOL/L (ref 98–107)
CO2 SERPL-SCNC: 26 MMOL/L (ref 23–31)
COLOR UR AUTO: YELLOW
CREAT SERPL-MCNC: 0.85 MG/DL (ref 0.73–1.18)
EOSINOPHIL # BLD AUTO: 0.35 X10(3)/MCL (ref 0–0.9)
EOSINOPHIL NFR BLD AUTO: 3.5 %
ERYTHROCYTE [DISTWIDTH] IN BLOOD BY AUTOMATED COUNT: 12.9 % (ref 11.5–17)
GFR SERPLBLD CREATININE-BSD FMLA CKD-EPI: >60 MLS/MIN/1.73/M2
GLOBULIN SER-MCNC: 2.9 GM/DL (ref 2.4–3.5)
GLUCOSE SERPL-MCNC: 106 MG/DL (ref 82–115)
GLUCOSE UR QL STRIP.AUTO: NORMAL MG/DL
HCT VFR BLD AUTO: 39.5 % (ref 42–52)
HGB BLD-MCNC: 13.5 GM/DL (ref 14–18)
HYALINE CASTS #/AREA URNS LPF: ABNORMAL /LPF
IMM GRANULOCYTES # BLD AUTO: 0.04 X10(3)/MCL (ref 0–0.04)
IMM GRANULOCYTES NFR BLD AUTO: 0.4 %
KETONES UR QL STRIP.AUTO: NEGATIVE MG/DL
LACTATE SERPL-SCNC: 2.5 MMOL/L (ref 0.5–2.2)
LEUKOCYTE ESTERASE UR QL STRIP.AUTO: NEGATIVE UNIT/L
LIPASE SERPL-CCNC: 137 U/L
LYMPHOCYTES # BLD AUTO: 1.95 X10(3)/MCL (ref 0.6–4.6)
LYMPHOCYTES NFR BLD AUTO: 19.4 %
MCH RBC QN AUTO: 30.7 PG (ref 27–31)
MCHC RBC AUTO-ENTMCNC: 34.2 MG/DL (ref 33–36)
MCV RBC AUTO: 89.8 FL (ref 80–94)
MONOCYTES # BLD AUTO: 1.22 X10(3)/MCL (ref 0.1–1.3)
MONOCYTES NFR BLD AUTO: 12.1 %
MUCOUS THREADS URNS QL MICRO: ABNORMAL /LPF
NEUTROPHILS # BLD AUTO: 6.5 X10(3)/MCL (ref 2.1–9.2)
NEUTROPHILS NFR BLD AUTO: 64.3 %
NITRITE UR QL STRIP.AUTO: NEGATIVE
NRBC BLD AUTO-RTO: 0 %
PH UR STRIP.AUTO: 5.5 [PH]
PLATELET # BLD AUTO: 331 X10(3)/MCL (ref 130–400)
PMV BLD AUTO: 9.1 FL (ref 7.4–10.4)
POTASSIUM SERPL-SCNC: 4.5 MMOL/L (ref 3.5–5.1)
PROT SERPL-MCNC: 6.2 GM/DL (ref 5.8–7.6)
PROT UR QL STRIP.AUTO: ABNORMAL MG/DL
RBC # BLD AUTO: 4.4 X10(6)/MCL (ref 4.7–6.1)
RBC #/AREA URNS AUTO: ABNORMAL /HPF
RBC UR QL AUTO: NEGATIVE UNIT/L
SODIUM SERPL-SCNC: 144 MMOL/L (ref 136–145)
SP GR UR STRIP.AUTO: 1.02
SPERM URNS QL MICRO: ABNORMAL /HPF
SQUAMOUS #/AREA URNS LPF: ABNORMAL /HPF
TROPONIN I SERPL-MCNC: <0.01 NG/ML (ref 0–0.04)
UROBILINOGEN UR STRIP-ACNC: NORMAL MG/DL
WBC # SPEC AUTO: 10.1 X10(3)/MCL (ref 4.5–11.5)
WBC #/AREA URNS AUTO: ABNORMAL /HPF

## 2022-11-13 PROCEDURE — 99285 EMERGENCY DEPT VISIT HI MDM: CPT | Mod: 25

## 2022-11-13 PROCEDURE — 84484 ASSAY OF TROPONIN QUANT: CPT | Performed by: STUDENT IN AN ORGANIZED HEALTH CARE EDUCATION/TRAINING PROGRAM

## 2022-11-13 PROCEDURE — 96375 TX/PRO/DX INJ NEW DRUG ADDON: CPT

## 2022-11-13 PROCEDURE — 96361 HYDRATE IV INFUSION ADD-ON: CPT

## 2022-11-13 PROCEDURE — 80053 COMPREHEN METABOLIC PANEL: CPT | Performed by: STUDENT IN AN ORGANIZED HEALTH CARE EDUCATION/TRAINING PROGRAM

## 2022-11-13 PROCEDURE — 83690 ASSAY OF LIPASE: CPT | Performed by: STUDENT IN AN ORGANIZED HEALTH CARE EDUCATION/TRAINING PROGRAM

## 2022-11-13 PROCEDURE — 25500020 PHARM REV CODE 255

## 2022-11-13 PROCEDURE — 63600175 PHARM REV CODE 636 W HCPCS: Performed by: STUDENT IN AN ORGANIZED HEALTH CARE EDUCATION/TRAINING PROGRAM

## 2022-11-13 PROCEDURE — 25000003 PHARM REV CODE 250: Performed by: STUDENT IN AN ORGANIZED HEALTH CARE EDUCATION/TRAINING PROGRAM

## 2022-11-13 PROCEDURE — 96374 THER/PROPH/DIAG INJ IV PUSH: CPT

## 2022-11-13 PROCEDURE — 96360 HYDRATION IV INFUSION INIT: CPT | Mod: 59

## 2022-11-13 PROCEDURE — 85025 COMPLETE CBC W/AUTO DIFF WBC: CPT | Performed by: STUDENT IN AN ORGANIZED HEALTH CARE EDUCATION/TRAINING PROGRAM

## 2022-11-13 PROCEDURE — 93005 ELECTROCARDIOGRAM TRACING: CPT

## 2022-11-13 PROCEDURE — 83605 ASSAY OF LACTIC ACID: CPT | Performed by: STUDENT IN AN ORGANIZED HEALTH CARE EDUCATION/TRAINING PROGRAM

## 2022-11-13 PROCEDURE — 81001 URINALYSIS AUTO W/SCOPE: CPT | Performed by: STUDENT IN AN ORGANIZED HEALTH CARE EDUCATION/TRAINING PROGRAM

## 2022-11-13 PROCEDURE — 36415 COLL VENOUS BLD VENIPUNCTURE: CPT | Performed by: STUDENT IN AN ORGANIZED HEALTH CARE EDUCATION/TRAINING PROGRAM

## 2022-11-13 RX ORDER — METRONIDAZOLE 500 MG/1
500 TABLET ORAL 3 TIMES DAILY
Qty: 30 TABLET | Refills: 0 | Status: SHIPPED | OUTPATIENT
Start: 2022-11-13 | End: 2022-11-23

## 2022-11-13 RX ORDER — HYDROCODONE BITARTRATE AND ACETAMINOPHEN 5; 325 MG/1; MG/1
1 TABLET ORAL EVERY 6 HOURS PRN
Qty: 10 TABLET | Refills: 0 | Status: SHIPPED | OUTPATIENT
Start: 2022-11-13 | End: 2022-11-13 | Stop reason: CLARIF

## 2022-11-13 RX ORDER — HYDROCODONE BITARTRATE AND ACETAMINOPHEN 5; 325 MG/1; MG/1
1 TABLET ORAL EVERY 6 HOURS PRN
Qty: 10 TABLET | Refills: 0 | Status: SHIPPED | OUTPATIENT
Start: 2022-11-13 | End: 2022-11-13 | Stop reason: SDUPTHER

## 2022-11-13 RX ORDER — CIPROFLOXACIN 500 MG/1
500 TABLET ORAL 2 TIMES DAILY
Qty: 20 TABLET | Refills: 0 | Status: SHIPPED | OUTPATIENT
Start: 2022-11-13 | End: 2022-11-23

## 2022-11-13 RX ORDER — ONDANSETRON 2 MG/ML
4 INJECTION INTRAMUSCULAR; INTRAVENOUS
Status: COMPLETED | OUTPATIENT
Start: 2022-11-13 | End: 2022-11-13

## 2022-11-13 RX ORDER — ONDANSETRON 4 MG/1
4 TABLET, ORALLY DISINTEGRATING ORAL EVERY 6 HOURS PRN
Qty: 14 TABLET | Refills: 0 | Status: SHIPPED | OUTPATIENT
Start: 2022-11-13 | End: 2023-02-09

## 2022-11-13 RX ADMIN — SODIUM CHLORIDE 1000 ML: 9 INJECTION, SOLUTION INTRAVENOUS at 07:11

## 2022-11-13 RX ADMIN — IOHEXOL 100 ML: 350 INJECTION, SOLUTION INTRAVENOUS at 06:11

## 2022-11-13 RX ADMIN — SODIUM CHLORIDE 1000 ML: 9 INJECTION, SOLUTION INTRAVENOUS at 05:11

## 2022-11-13 RX ADMIN — ONDANSETRON 4 MG: 2 INJECTION INTRAMUSCULAR; INTRAVENOUS at 05:11

## 2022-11-13 RX ADMIN — ONDANSETRON 4 MG: 2 INJECTION INTRAMUSCULAR; INTRAVENOUS at 08:11

## 2022-11-14 NOTE — ED PROVIDER NOTES
Encounter Date: 11/13/2022       History     Chief Complaint   Patient presents with    Vomiting     N/v/d x3 weeks, seen here two fridays ago and told he had a stomach virus.      66-year-old male presents to ED for persistent nausea vomiting diarrhea and lower abdominal discomfort. was seen several weeks ago and discharged. states his symptoms have persisted since.  Intermittent nausea vomiting which he states depends on the time of day.  States he wake up in the morning and is okay however by the afternoon he becomes uncomfortable.  Reporting a bilateral lower abdominal discomfort. denies any dysuria or hematuria. endorsed improving diarrhea but still present.  No fevers or chills.  Otherwise no abdominal pain.  No worsening abdominal distention, no new back or flank pain.  has chronic back pain, unchanged.  No trauma.  No other complaints or concerns at this time.    Review of patient's allergies indicates:   Allergen Reactions    Penicillins      Other reaction(s): Passed out     Past Medical History:   Diagnosis Date    Acid reflux     Anxiety     Coronary artery disease     Glaucoma     Hyperlipidemia     Hypertension     Obesity     Panic attacks     Sleep apnea     Vision impairment      Past Surgical History:   Procedure Laterality Date    APPENDECTOMY      blebectomy      COLONOSCOPY      HERNIA REPAIR       Family History   Problem Relation Age of Onset    Hypertension Mother     Heart failure Father     Hypertension Father     Heart failure Sister     Hypertension Sister     Heart attack Sister     Stroke Brother      Social History     Tobacco Use    Smoking status: Never    Smokeless tobacco: Never   Substance Use Topics    Alcohol use: Not Currently    Drug use: Never     Review of Systems   Constitutional:  Negative for chills and fever.   HENT:  Negative for congestion, rhinorrhea and sore throat.    Eyes:  Negative for pain, discharge and itching.   Respiratory:   Negative for chest tightness and shortness of breath.    Cardiovascular:  Negative for chest pain and palpitations.   Gastrointestinal:  Positive for abdominal pain, diarrhea, nausea and vomiting. Negative for abdominal distention, anal bleeding, blood in stool and constipation.   Genitourinary:  Negative for dysuria, flank pain, frequency and hematuria.   Musculoskeletal:  Positive for back pain. Negative for myalgias and neck pain.        Chronic, unchanged.   Skin:  Negative for color change and rash.   Neurological:  Negative for dizziness, weakness and headaches.   Psychiatric/Behavioral:  Negative for confusion. The patient is not hyperactive.      Physical Exam     Initial Vitals [11/13/22 1659]   BP Pulse Resp Temp SpO2   (!) 160/92 66 18 97.6 °F (36.4 °C) 100 %      MAP       --         Physical Exam    Constitutional: He is not diaphoretic. No distress.   HENT:   Head: Normocephalic and atraumatic.   Eyes: Conjunctivae and EOM are normal. Pupils are equal, round, and reactive to light.   Neck: Neck supple. No tracheal deviation present.   Normal range of motion.  Cardiovascular:  Normal rate, regular rhythm and normal heart sounds.           Pulmonary/Chest: Breath sounds normal. No respiratory distress.   Abdominal: Abdomen is soft. There is abdominal tenderness in the right lower quadrant, suprapubic area and left lower quadrant.   Mild abdominal discomfort to deep palpation. No active N/V.   No right CVA tenderness.  No left CVA tenderness. There is no rebound, no guarding, no tenderness at McBurney's point and negative Blanchard's sign. negative Rovsing's sign  Musculoskeletal:         General: No tenderness. Normal range of motion.      Cervical back: Normal range of motion and neck supple.     Neurological: He is alert and oriented to person, place, and time. He has normal strength. GCS score is 15. GCS eye subscore is 4. GCS verbal subscore is 5. GCS motor subscore is 6.   Skin: Skin is warm and dry.  Capillary refill takes less than 2 seconds. No rash noted.   Psychiatric: He has a normal mood and affect. His behavior is normal. Judgment and thought content normal.       ED Course   Procedures  Labs Reviewed   COMPREHENSIVE METABOLIC PANEL - Abnormal; Notable for the following components:       Result Value    Chloride 109 (*)     Albumin Level 3.3 (*)     All other components within normal limits   LIPASE - Abnormal; Notable for the following components:    Lipase Level 137 (*)     All other components within normal limits   LACTIC ACID, PLASMA - Abnormal; Notable for the following components:    Lactic Acid Level 2.5 (*)     All other components within normal limits   URINALYSIS, REFLEX TO URINE CULTURE - Abnormal; Notable for the following components:    Protein, UA 1+ (*)     Squamous Epithelial Cells, UA Trace (*)     Mucous, UA Trace (*)     Sperm, UA Many (*)     Hyaline Casts, UA 0-2 (*)     All other components within normal limits   CBC WITH DIFFERENTIAL - Abnormal; Notable for the following components:    RBC 4.40 (*)     Hgb 13.5 (*)     Hct 39.5 (*)     All other components within normal limits   TROPONIN I - Normal   CBC W/ AUTO DIFFERENTIAL    Narrative:     The following orders were created for panel order CBC auto differential.  Procedure                               Abnormality         Status                     ---------                               -----------         ------                     CBC with Differential[[id number]]        Abnormal            Final result                 Please view results for these tests on the individual orders.   EXTRA TUBES    Narrative:     The following orders were created for panel order EXTRA TUBES.  Procedure                               Abnormality         Status                     ---------                               -----------         ------                     Light Blue Top Hold[[id number]]                              In process                    Please view results for these tests on the individual orders.   LIGHT BLUE TOP HOLD     EKG Readings: (Independently Interpreted)   Initial Reading: No STEMI. Rhythm: Normal Sinus Rhythm. Ectopy: No Ectopy. Conduction: Normal. Axis: Normal. Clinical Impression: Normal Sinus Rhythm   ECG Results              EKG 12-lead (In process)  Result time 11/13/22 17:48:58      In process by Interface, Lab In [facility] (11/13/22 17:48:58)                   Narrative:    Test Reason : R07.9,    Vent. Rate : 066 BPM     Atrial Rate : 066 BPM     P-R Int : 136 ms          QRS Dur : 094 ms      QT Int : 384 ms       P-R-T Axes : 038 018 036 degrees     QTc Int : 402 ms    Normal sinus rhythm  Nonspecific ST abnormality  Abnormal ECG  No previous ECGs available    Referred By: [email or web address]   SELF           Confirmed By:                                   Imaging Results              CT Abdomen Pelvis With Contrast (Final result)  Result time 11/13/22 19:58:54      Final result by Juan Flood MD (11/13/22 19:58:54)                   Impression:      There are mild air-fluid levels throughout the colon with no gross inflammatory change. There is mild mucosal hyperemia. Mild colitis or ileus is suspected.      Electronically signed by: Juan Flood  Date:    11/13/2022  Time:    19:58               Narrative:    EXAMINATION:  CT ABDOMEN PELVIS WITH CONTRAST    CLINICAL HISTORY:  Abdominal pain, acute, nonlocalized;    TECHNIQUE:  Multidetector IV contrast enhanced axial CT images of the abdomen and pelvis were obtained with coronal and sagittal reconstructions.    Automatic exposure control was utilized to reduce the patient's radiation dose.    DLP= 829    COMPARISON:  No prior imaging available for comparison.    FINDINGS:  01. HEPATOBILIARY: No focal hepatic lesion is identified, The gallbladder is normal.    02. SPLEEN: Normal    03. PANCREAS: No focal masses or ductal dilatation.    04. ADRENALS: No adrenal nodules.    05.  KIDNEYS: The right kidney demonstrates no stone, hydronephrosis, or hydroureter. No focal mass identified. The left kidney demonstrates no stone, hydronephrosis, or hydroureter. No focal mass identified.    06. LYMPHADENOPATHY/RETROPERITONEUM: There is no retroperitoneal lymphadenopathy. The abdominal aorta is normal in course and caliber.    07. BOWEL: No evidence of appendiceal inflammation.  There are mild air-fluid levels throughout the colon with no gross inflammatory change.  There is mild mucosal hyperemia.  Mild colitis or ileus is suspected.    08. PELVIC VISCERA: Normal. No pelvic mass.    09. PELVIC LYMPH NODES: No lymphadenopathy.    10. PERITONEUM/ABDOMINAL WALL: No ascites or implant.    11. SKELETAL: No aggressive appearing lytic/blastic lesion. No acute fractures, subluxations or dislocations.    12. LUNG BASES: The visualized lungs are unremarkable.                                       Medications   sodium chloride 0.9% bolus 1,000 mL (0 mLs Intravenous Stopped 11/13/22 1830)   ondansetron injection 4 mg (4 mg Intravenous Given 11/13/22 1732)   iohexoL (OMNIPAQUE 350) 350 mg iodine/mL injection (100 mLs  Given 11/13/22 1800)   sodium chloride 0.9% bolus 1,000 mL (0 mLs Intravenous Stopped 11/13/22 2016)   ondansetron injection 4 mg (4 mg Intravenous Given 11/13/22 2045)     Medical Decision Making:   Clinical Tests:   Lab Tests: Reviewed and Ordered  Radiological Study: Ordered and Reviewed  Medical Tests: Reviewed and Ordered  ED Management:  66-year-old male presents to ED for persistent nausea vomiting with improving diarrhea and some intermittent lower abdominal pain.  On arrival vitals stable.  In no acute distress.  No active nausea or vomiting during exam.  Has very mild lower abdominal discomfort to palpation.  No rebound guarding or rigidity.  Otherwise exam unremarkable.  Has large baseline abdomen that does not appear acutely distended.  No positive fluid wave. labs and workup were  significant for elevated lipase >100, elevated lactic 2+ with otherwise normal urine, no leukocytosis and normal kidney function.  Abdomen ultimately CT'd that was neg acute besides ileus versus mild colitis.  Patient felt significantly better after fluid boluses in department and antiemetic.  Patient is most concerned about his nausea which was controlled using Zofran. Pt has pain medication at home and declined further pain meds.  Will provide antibiotic coverage for colitis.  Since vitals stable, physical exam benign and pt tolerating fluids will discharge home at this time.  Is to follow up closely in the outpatient setting.  Very strict return precautions provided and patient is ultimately stable for release. (Twila)           ED Course as of 11/14/22 0134   Sun Nov 13, 2022 2035 Initially unable to print Norco secondary to prescription paper being out. Then patient states he has [place] 10's at home and does not want an additional prescription. Canceled script. [RZ]      ED Course User Index  [RZ] Tello Mattson MD                 Clinical Impression:   Final diagnoses:  [K52.9] Colitis (Primary)  [K85.90] Acute pancreatitis, unspecified complication status, unspecified pancreatitis type  [R11.2] Nausea and vomiting, unspecified vomiting type  [R19.7] Diarrhea, unspecified type        ED Disposition Condition    Discharge Stable          ED Prescriptions       Medication Sig Dispense Start Date End Date Auth. Provider    ciprofloxacin HCl (CIPRO) 500 MG tablet Take 1 tablet (500 mg total) by mouth 2 (two) times daily. for 10 days 20 tablet 11/13/2022 11/23/2022 Tello Mattson MD    metroNIDAZOLE (FLAGYL) 500 MG tablet Take 1 tablet (500 mg total) by mouth 3 (three) times daily. for 10 days 30 tablet 11/13/2022 11/23/2022 Tello Mattson MD    HYDROcodone-acetaminophen (NORCO) 5-325 mg per tablet  (Status: Discontinued) Take 1 tablet by mouth every 6 (six) hours as needed for Pain. 10 tablet 11/13/2022 11/13/2022  Tello Mattson MD    ondansetron (ZOFRAN-ODT) 4 MG TbDL Take 1 tablet (4 mg total) by mouth every 6 (six) hours as needed (nausea). 14 tablet 11/13/2022 -- Tello Mattson MD    HYDROcodone-acetaminophen (NORCO) 5-325 mg per tablet  (Status: Discontinued) Take 1 tablet by mouth every 6 (six) hours as needed for Pain. 10 tablet 11/13/2022 11/13/2022 Tello Mattson MD          Follow-up Information       Follow up With Specialties Details Why Contact [facility] [place] Sleep Medicine Schedule an appointment as soon as possible for a visit in 3 days  [address] [facility] [address]  [phone]      [facility] Emergency Medicine  As needed, If symptoms worsen [address] [address] [phone]             Tello Mattson MD  11/14/22 0145

## 2022-11-15 ENCOUNTER — OFFICE VISIT (OUTPATIENT)
Dept: CARDIOLOGY | Facility: CLINIC | Age: 67
End: 2022-11-15
Payer: MEDICARE

## 2022-11-15 VITALS
BODY MASS INDEX: 36.02 KG/M2 | DIASTOLIC BLOOD PRESSURE: 75 MMHG | HEIGHT: 71 IN | SYSTOLIC BLOOD PRESSURE: 135 MMHG | RESPIRATION RATE: 18 BRPM | OXYGEN SATURATION: 99 % | HEART RATE: 70 BPM | TEMPERATURE: 98 F | WEIGHT: 257.25 LBS

## 2022-11-15 DIAGNOSIS — R06.09 DOE (DYSPNEA ON EXERTION): ICD-10-CM

## 2022-11-15 DIAGNOSIS — R07.89 OTHER CHEST PAIN: Primary | ICD-10-CM

## 2022-11-15 DIAGNOSIS — I10 PRIMARY HYPERTENSION: ICD-10-CM

## 2022-11-15 DIAGNOSIS — E78.5 HYPERLIPIDEMIA LDL GOAL <70: ICD-10-CM

## 2022-11-15 DIAGNOSIS — I25.118 CORONARY ARTERY DISEASE OF NATIVE ARTERY OF NATIVE HEART WITH STABLE ANGINA PECTORIS: ICD-10-CM

## 2022-11-15 DIAGNOSIS — G47.33 OSA (OBSTRUCTIVE SLEEP APNEA): ICD-10-CM

## 2022-11-15 PROCEDURE — 3008F PR BODY MASS INDEX (BMI) DOCUMENTED: ICD-10-PCS | Mod: CPTII,,, | Performed by: NURSE PRACTITIONER

## 2022-11-15 PROCEDURE — 3075F PR MOST RECENT SYSTOLIC BLOOD PRESS GE 130-139MM HG: ICD-10-PCS | Mod: CPTII,,, | Performed by: NURSE PRACTITIONER

## 2022-11-15 PROCEDURE — 3078F PR MOST RECENT DIASTOLIC BLOOD PRESSURE < 80 MM HG: ICD-10-PCS | Mod: CPTII,,, | Performed by: NURSE PRACTITIONER

## 2022-11-15 PROCEDURE — 1159F PR MEDICATION LIST DOCUMENTED IN MEDICAL RECORD: ICD-10-PCS | Mod: CPTII,,, | Performed by: NURSE PRACTITIONER

## 2022-11-15 PROCEDURE — 1126F AMNT PAIN NOTED NONE PRSNT: CPT | Mod: CPTII,,, | Performed by: NURSE PRACTITIONER

## 2022-11-15 PROCEDURE — 1160F RVW MEDS BY RX/DR IN RCRD: CPT | Mod: CPTII,,, | Performed by: NURSE PRACTITIONER

## 2022-11-15 PROCEDURE — 3288F FALL RISK ASSESSMENT DOCD: CPT | Mod: CPTII,,, | Performed by: NURSE PRACTITIONER

## 2022-11-15 PROCEDURE — 1101F PR PT FALLS ASSESS DOC 0-1 FALLS W/OUT INJ PAST YR: ICD-10-PCS | Mod: CPTII,,, | Performed by: NURSE PRACTITIONER

## 2022-11-15 PROCEDURE — 99215 OFFICE O/P EST HI 40 MIN: CPT | Mod: PBBFAC | Performed by: NURSE PRACTITIONER

## 2022-11-15 PROCEDURE — 1101F PT FALLS ASSESS-DOCD LE1/YR: CPT | Mod: CPTII,,, | Performed by: NURSE PRACTITIONER

## 2022-11-15 PROCEDURE — 3075F SYST BP GE 130 - 139MM HG: CPT | Mod: CPTII,,, | Performed by: NURSE PRACTITIONER

## 2022-11-15 PROCEDURE — 1126F PR PAIN SEVERITY QUANTIFIED, NO PAIN PRESENT: ICD-10-PCS | Mod: CPTII,,, | Performed by: NURSE PRACTITIONER

## 2022-11-15 PROCEDURE — 1159F MED LIST DOCD IN RCRD: CPT | Mod: CPTII,,, | Performed by: NURSE PRACTITIONER

## 2022-11-15 PROCEDURE — 99213 PR OFFICE/OUTPT VISIT, EST, LEVL III, 20-29 MIN: ICD-10-PCS | Mod: S$PBB,,, | Performed by: NURSE PRACTITIONER

## 2022-11-15 PROCEDURE — 4010F ACE/ARB THERAPY RXD/TAKEN: CPT | Mod: CPTII,,, | Performed by: NURSE PRACTITIONER

## 2022-11-15 PROCEDURE — 99213 OFFICE O/P EST LOW 20 MIN: CPT | Mod: S$PBB,,, | Performed by: NURSE PRACTITIONER

## 2022-11-15 PROCEDURE — 3008F BODY MASS INDEX DOCD: CPT | Mod: CPTII,,, | Performed by: NURSE PRACTITIONER

## 2022-11-15 PROCEDURE — 4010F PR ACE/ARB THEARPY RXD/TAKEN: ICD-10-PCS | Mod: CPTII,,, | Performed by: NURSE PRACTITIONER

## 2022-11-15 PROCEDURE — 3078F DIAST BP <80 MM HG: CPT | Mod: CPTII,,, | Performed by: NURSE PRACTITIONER

## 2022-11-15 PROCEDURE — 3288F PR FALLS RISK ASSESSMENT DOCUMENTED: ICD-10-PCS | Mod: CPTII,,, | Performed by: NURSE PRACTITIONER

## 2022-11-15 PROCEDURE — 1160F PR REVIEW ALL MEDS BY PRESCRIBER/CLIN PHARMACIST DOCUMENTED: ICD-10-PCS | Mod: CPTII,,, | Performed by: NURSE PRACTITIONER

## 2022-11-15 RX ORDER — NITROGLYCERIN 0.4 MG/1
0.4 TABLET SUBLINGUAL
COMMUNITY
Start: 2021-11-03 | End: 2022-11-15 | Stop reason: SDUPTHER

## 2022-11-15 RX ORDER — GABAPENTIN 600 MG/1
600 TABLET ORAL 3 TIMES DAILY
COMMUNITY
Start: 2022-08-05

## 2022-11-15 RX ORDER — HYDROCODONE BITARTRATE AND ACETAMINOPHEN 10; 325 MG/1; MG/1
1 TABLET ORAL
COMMUNITY
Start: 2021-12-01

## 2022-11-15 RX ORDER — LISINOPRIL AND HYDROCHLOROTHIAZIDE 12.5; 2 MG/1; MG/1
TABLET ORAL
COMMUNITY
Start: 2021-11-22 | End: 2022-11-15 | Stop reason: SDUPTHER

## 2022-11-15 RX ORDER — DOCUSATE SODIUM 100 MG/1
1 CAPSULE, LIQUID FILLED ORAL
COMMUNITY
Start: 2021-12-01

## 2022-11-15 RX ORDER — LISINOPRIL AND HYDROCHLOROTHIAZIDE 12.5; 2 MG/1; MG/1
1 TABLET ORAL DAILY
Qty: 90 TABLET | Refills: 3 | Status: SHIPPED | OUTPATIENT
Start: 2022-11-15 | End: 2023-11-16 | Stop reason: SDUPTHER

## 2022-11-15 RX ORDER — BRIMONIDINE TARTRATE 1.5 MG/ML
1 SOLUTION/ DROPS OPHTHALMIC
COMMUNITY
Start: 2021-07-30 | End: 2024-01-11

## 2022-11-15 RX ORDER — NITROGLYCERIN 0.4 MG/1
0.4 TABLET SUBLINGUAL EVERY 5 MIN PRN
Qty: 25 TABLET | Refills: 6 | Status: SHIPPED | OUTPATIENT
Start: 2022-11-15 | End: 2023-11-16 | Stop reason: SDUPTHER

## 2022-11-15 NOTE — PATIENT INSTRUCTIONS
Complete FLP - order previously placed   Follow up in Cardiology Clinic in 4 months  Follow up with PCP as directed

## 2022-11-15 NOTE — PROGRESS NOTES
"CHIEF COMPLAINT:   Chief Complaint   Patient presents with    Follow-up     4 mos f/u     Chest Pain     Chest pain took nitro 3 nights ago                Review of patient's allergies indicates:   Allergen Reactions    Penicillins      Other reaction(s): Passed out                                          HPI:  Doug Faye 66 y.o. male with a past medical history of nonobstructive CAD-[facility] in 2010 showing non-obstructive CAD (LAD with 20% stenosis), HLD, HTN, obesity, ANGELITA (not currently using a PAP device), and GERD presents for 4 month follow up and ongoing care. Patient completed PFTs in July 2021 which were normal. He completed a Lexiscan stress test on 2/25/2021 which revealed inferior and lateral ischemia. He underwent a subsequent left heart cath on 3/15/2021 that revealed no angiographic evidence of obstructive CAD, only nonobstructive CAD with ectatic RCA. He completed an Echocardiogram on 4.14.21 that revealed an intact ejection fraction of 50 to 55%. (see full report below).     Patient reports occasional episodes of chest pain with most recent episode about 2-3 days ago. Chest pain was left sided and described as "crampy" which was relieved with SL Nitro. Patient endorses stable, occasional QUIROZ. Patient denies palpitations, orthopnea, PND, syncope, and bilateral lower extremity edema. Patient performed sleep study and is waiting on approval for a BiPAP by insurance. He continues to use a cane due to chronic back pain. Of note, patient was recently diagnosed with acute gastritis, acute colitis,  and acute pancreatitis and is currently being treated with antibiotics.     PFT testing July 14, 2021:  Normal PFT    TTE 4.14.21  Left ventricular ejection fraction is measured approximately 50 to 55%  Structurally normal mitral valve  Structurally aortic valve is trileaflet  Tricuspid valve is structurally normal  There is mild tricuspid regurgitation with RVSP estimated at 34 mmHg  Pulmonic valve is grossly " normal  Pulmonic regurgitation present  Mildly dilated left atrium  Left ventricular ejection fraction is measured approximately 50 to 55%  Mildly dilated right atrium  Normal right ventricular size with preserved RV function    Lexiscan stress test 2/25/2021:  Inferior and lateral ischemia  No scar    Echocardiogram January 24, 2017:  Normal left ventricular cavity with overall normal systolic function, EF measured at approximately 59%    Echocardiogram April 25, 2016:  Normal left ventricular cavity with overall normal systolic function-assessed at 63%. Mild MR. Normal aortic valve. Mild TR. Trace pulmonic regurgitation is present. Mildly dilated left atrium. E/A flow reversal noted. Suggestive of diastolic dysfunction. Mildly enlarged right atrium size. Normal right ventricular size with preserved RV function. No evidence of significant pericardial effusion is noted.    Lexiscan 2/23/16  Impression:  Normal myocardial perfusion study. Negative for ischemia. Mildly  reduced LVEF of 46%       Patient Active Problem List   Diagnosis    Coronary artery disease of native artery of native heart with stable angina pectoris    Hyperlipidemia LDL goal <70    Primary hypertension    Other chest pain    QUIROZ (dyspnea on exertion)    ANGELITA (obstructive sleep apnea)     Past Surgical History:   Procedure Laterality Date    APPENDECTOMY      blebectomy      COLONOSCOPY      HERNIA REPAIR       Social History     Socioeconomic History    Marital status:    Tobacco Use    Smoking status: Never    Smokeless tobacco: Never   Substance and Sexual Activity    Alcohol use: Not Currently    Drug use: Never        Family History   Problem Relation Age of Onset    Hypertension Mother     Heart failure Father     Hypertension Father     Heart failure Sister     Hypertension Sister     Heart attack Sister     Stroke Brother      Current Outpatient Medications:     aspirin (ECOTRIN) 81 MG EC tablet, Take 1 tablet (81 mg total) by mouth  once daily., Disp: 90 tablet, Rfl: 3    atorvastatin (LIPITOR) 20 MG tablet, Take 1 tablet (20 mg total) by mouth once daily., Disp: 90 tablet, Rfl: 3    brimonidine 0.15 % OPTH DROP (ALPHAGAN) 0.15 % ophthalmic solution, Place 1 drop into both eyes 3 (three) times daily., Disp: 10 mL, Rfl: 5    brimonidine 0.15 % OPTH DROP (ALPHAGAN) 0.15 % ophthalmic solution, Apply 1 drop to eye., Disp: , Rfl:     brinzolamide (AZOPT) 1 % ophthalmic suspension, Place 1 drop into both eyes 3 (three) times daily., Disp: 10 mL, Rfl: 5    carvediloL (COREG) 6.25 MG tablet, Take 1 tablet (6.25 mg total) by mouth 2 (two) times daily., Disp: 180 tablet, Rfl: 3    ciprofloxacin HCl (CIPRO) 500 MG tablet, Take 1 tablet (500 mg total) by mouth 2 (two) times daily. for 10 days, Disp: 20 tablet, Rfl: 0    docusate sodium (COLACE) 100 MG capsule, 1 capsule as needed., Disp: , Rfl:     DULoxetine (CYMBALTA) 60 MG capsule, Take 60 mg by mouth once daily., Disp: , Rfl:     gabapentin (NEURONTIN) 300 MG capsule, Take 300 mg by mouth once daily., Disp: , Rfl:     gabapentin (NEURONTIN) 600 MG tablet, Take 600 mg by mouth 3 (three) times daily., Disp: , Rfl:     HYDROcodone-acetaminophen (NORCO)  mg per tablet, 1 tablet as needed., Disp: , Rfl:     latanoprost 0.005 % ophthalmic solution, Place 1 drop into both eyes once daily., Disp: 2.5 mL, Rfl: 5    lisinopriL-hydrochlorothiazide (PRINZIDE,ZESTORETIC) 20-12.5 mg per tablet, Take 1 tablet by mouth once daily., Disp: 90 tablet, Rfl: 3    lisinopriL-hydrochlorothiazide (PRINZIDE,ZESTORETIC) 20-12.5 mg per tablet,  See Instructions, TAKE ONE TABLET BY MOUTH EVERY DAY, # 30 tab(s), 6 Refill(s), Pharmacy: [id number], 180, cm, Height/Length Dosing, 11/03/21 10:07:00 CDT, 119.4, kg, Weight Dosing, 11/03/21 10:07:00 CDT, Disp: , Rfl:     methocarbamoL (ROBAXIN) 750 MG Tab, Take 750 mg by mouth 3 (three) times daily., Disp: , Rfl:     metroNIDAZOLE (FLAGYL) 500 MG tablet, Take 1 tablet  "(500 mg total) by mouth 3 (three) times daily. for 10 days, Disp: 30 tablet, Rfl: 0    nitroGLYCERIN (NITROSTAT) 0.4 MG SL tablet, Place under the tongue., Disp: , Rfl:     nitroGLYCERIN (NITROSTAT) 0.4 MG SL tablet, Place 0.4 mg under the tongue., Disp: , Rfl:     ondansetron (ZOFRAN-ODT) 4 MG TbDL, Take 1 tablet (4 mg total) by mouth every 6 (six) hours as needed (nausea)., Disp: 14 tablet, Rfl: 0    pantoprazole (PROTONIX) 40 MG tablet, Take 40 mg by mouth once daily., Disp: , Rfl:     tamsulosin (FLOMAX) 0.4 mg Cap, Take 1 capsule by mouth once daily., Disp: , Rfl:     traZODone (DESYREL) 50 MG tablet, Take 50 mg by mouth nightly., Disp: , Rfl:      ROS:                                                                                                                                                                             Review of Systems   Constitutional: Negative.    Eyes:         Visual impairment   Respiratory: Negative.     Cardiovascular:  Positive for chest pain.   Gastrointestinal: Negative.    Musculoskeletal: Negative.    Skin: Negative.    Neurological: Negative.    Psychiatric/Behavioral: Negative.        Blood pressure 135/75, pulse 70, temperature 97.7 °F (36.5 °C), resp. rate 18, height 5' 11" (1.803 m), weight 116.7 kg (257 lb 4.4 oz), SpO2 99 %.     PE:  Physical Exam  Constitutional:       Appearance: Normal appearance.   HENT:      Head: Normocephalic.   Eyes:      Extraocular Movements: Extraocular movements intact.   Cardiovascular:      Rate and Rhythm: Normal rate and regular rhythm.   Pulmonary:      Effort: Pulmonary effort is normal.   Abdominal:      Palpations: Abdomen is soft.   Musculoskeletal:         General: Normal range of motion.      Cervical back: Neck supple.   Skin:     General: Skin is warm and dry.   Neurological:      General: No focal deficit present.      Mental Status: He is alert and oriented to person, place, and time.   Psychiatric:         Mood and Affect: Mood " normal.     ASSESSMENT/PLAN:  Nonobstructive CAD  [facility] - 3.15.21 that revealed no angiographic evidence of obstructive CAD, only nonobstructive CAD with ectatic RCA (3.15.21)  LVEF - 50-55% per TTE 4.14.21  Lexiscan Stress Test 2.25.21: Inferior and lateral ischemia  Endorses occasional chest pain relieved with SL Nitro   Continue Aspirin, Carvedilol, Lisinopril, Atorvastatin, and SL Nitro prn CP  Counseled on the importance of consuming a heart healthy diet and exercise as tolerated     QUIROZ - stable   LVEF - 50-55% per TTE 4.14.21  PFTs July 14, 2021 - normal    HTN  BP at goal 135/75  Continue Carvedilol and Lisinopril  Counseled on low salt diet     GERD  Management per PCP    ANGELITA  Awaiting BiPAP approval from insurance     Hypercholesterolemia  LDL at goal - 40 (February 2022) - current order to complete updated FLP in computer   Continue atorvastatin 20 mg and Fish Oil BID  Counseled on the importance of obtaining a healthy BMI, and consuming a heart healthy, low-cholesterol diet    Chronic Back Pain  Continue with pain management as directed    Complete FLP - order previously placed   Follow up in Cardiology Clinic in 4 months  Follow up with PCP as directed

## 2022-12-05 ENCOUNTER — LAB VISIT (OUTPATIENT)
Dept: LAB | Facility: HOSPITAL | Age: 67
End: 2022-12-05
Attending: NURSE PRACTITIONER
Payer: MEDICARE

## 2022-12-05 DIAGNOSIS — E78.5 HYPERLIPIDEMIA LDL GOAL <70: ICD-10-CM

## 2022-12-05 LAB
CHOLEST SERPL-MCNC: 78 MG/DL
CHOLEST/HDLC SERPL: 3 {RATIO} (ref 0–5)
HDLC SERPL-MCNC: 24 MG/DL (ref 35–60)
LDLC SERPL CALC-MCNC: 31 MG/DL (ref 50–140)
TRIGL SERPL-MCNC: 114 MG/DL (ref 34–140)
VLDLC SERPL CALC-MCNC: 23 MG/DL

## 2022-12-05 PROCEDURE — 80061 LIPID PANEL: CPT

## 2022-12-05 PROCEDURE — 36415 COLL VENOUS BLD VENIPUNCTURE: CPT

## 2022-12-09 ENCOUNTER — DOCUMENTATION ONLY (OUTPATIENT)
Dept: PRIMARY CARE CLINIC | Facility: CLINIC | Age: 67
End: 2022-12-09
Payer: MEDICARE

## 2023-02-09 ENCOUNTER — OFFICE VISIT (OUTPATIENT)
Dept: CARDIOLOGY | Facility: CLINIC | Age: 68
End: 2023-02-09
Payer: MEDICARE

## 2023-02-09 VITALS
DIASTOLIC BLOOD PRESSURE: 67 MMHG | SYSTOLIC BLOOD PRESSURE: 113 MMHG | WEIGHT: 251 LBS | BODY MASS INDEX: 35.14 KG/M2 | OXYGEN SATURATION: 97 % | RESPIRATION RATE: 18 BRPM | HEART RATE: 61 BPM | HEIGHT: 71 IN | TEMPERATURE: 98 F

## 2023-02-09 DIAGNOSIS — E78.5 HYPERLIPIDEMIA LDL GOAL <70: ICD-10-CM

## 2023-02-09 DIAGNOSIS — G47.33 OSA TREATED WITH BIPAP: ICD-10-CM

## 2023-02-09 DIAGNOSIS — I25.10 CORONARY ARTERY DISEASE INVOLVING NATIVE CORONARY ARTERY OF NATIVE HEART WITHOUT ANGINA PECTORIS: Primary | ICD-10-CM

## 2023-02-09 DIAGNOSIS — I10 PRIMARY HYPERTENSION: ICD-10-CM

## 2023-02-09 DIAGNOSIS — R06.09 DOE (DYSPNEA ON EXERTION): ICD-10-CM

## 2023-02-09 PROCEDURE — 1160F RVW MEDS BY RX/DR IN RCRD: CPT | Mod: CPTII,,, | Performed by: NURSE PRACTITIONER

## 2023-02-09 PROCEDURE — 1159F PR MEDICATION LIST DOCUMENTED IN MEDICAL RECORD: ICD-10-PCS | Mod: CPTII,,, | Performed by: NURSE PRACTITIONER

## 2023-02-09 PROCEDURE — 3074F PR MOST RECENT SYSTOLIC BLOOD PRESSURE < 130 MM HG: ICD-10-PCS | Mod: CPTII,,, | Performed by: NURSE PRACTITIONER

## 2023-02-09 PROCEDURE — 1101F PR PT FALLS ASSESS DOC 0-1 FALLS W/OUT INJ PAST YR: ICD-10-PCS | Mod: CPTII,,, | Performed by: NURSE PRACTITIONER

## 2023-02-09 PROCEDURE — 1160F PR REVIEW ALL MEDS BY PRESCRIBER/CLIN PHARMACIST DOCUMENTED: ICD-10-PCS | Mod: CPTII,,, | Performed by: NURSE PRACTITIONER

## 2023-02-09 PROCEDURE — 3078F PR MOST RECENT DIASTOLIC BLOOD PRESSURE < 80 MM HG: ICD-10-PCS | Mod: CPTII,,, | Performed by: NURSE PRACTITIONER

## 2023-02-09 PROCEDURE — 99214 OFFICE O/P EST MOD 30 MIN: CPT | Mod: S$PBB,,, | Performed by: NURSE PRACTITIONER

## 2023-02-09 PROCEDURE — 3008F PR BODY MASS INDEX (BMI) DOCUMENTED: ICD-10-PCS | Mod: CPTII,,, | Performed by: NURSE PRACTITIONER

## 2023-02-09 PROCEDURE — 3288F PR FALLS RISK ASSESSMENT DOCUMENTED: ICD-10-PCS | Mod: CPTII,,, | Performed by: NURSE PRACTITIONER

## 2023-02-09 PROCEDURE — 1159F MED LIST DOCD IN RCRD: CPT | Mod: CPTII,,, | Performed by: NURSE PRACTITIONER

## 2023-02-09 PROCEDURE — 3074F SYST BP LT 130 MM HG: CPT | Mod: CPTII,,, | Performed by: NURSE PRACTITIONER

## 2023-02-09 PROCEDURE — 99214 PR OFFICE/OUTPT VISIT, EST, LEVL IV, 30-39 MIN: ICD-10-PCS | Mod: S$PBB,,, | Performed by: NURSE PRACTITIONER

## 2023-02-09 PROCEDURE — 3288F FALL RISK ASSESSMENT DOCD: CPT | Mod: CPTII,,, | Performed by: NURSE PRACTITIONER

## 2023-02-09 PROCEDURE — 1101F PT FALLS ASSESS-DOCD LE1/YR: CPT | Mod: CPTII,,, | Performed by: NURSE PRACTITIONER

## 2023-02-09 PROCEDURE — 3008F BODY MASS INDEX DOCD: CPT | Mod: CPTII,,, | Performed by: NURSE PRACTITIONER

## 2023-02-09 PROCEDURE — 3078F DIAST BP <80 MM HG: CPT | Mod: CPTII,,, | Performed by: NURSE PRACTITIONER

## 2023-02-09 PROCEDURE — 99214 OFFICE O/P EST MOD 30 MIN: CPT | Mod: PBBFAC | Performed by: NURSE PRACTITIONER

## 2023-02-09 NOTE — PROGRESS NOTES
CHIEF COMPLAINT:   Chief Complaint   Patient presents with    3mt f/u with lab.     Pt states external sleep lab wanted him to been seen by cardiologist prior to his next visit with them. He does report that family says he snores less with BiPAP.                Review of patient's allergies indicates:   Allergen Reactions    Penicillins      Other reaction(s): Passed out                                          HPI:  Doug Faye 67 y.o. male with a past medical history of nonobstructive CAD-[facility] in 2010 showing non-obstructive CAD (LAD with 20% stenosis), HLD, HTN, obesity, ANGELITA, and GERD presents for follow up and ongoing care. Patient completed PFTs in July 2021 which were normal. He completed a Lexiscan stress test on 2/25/2021 which revealed inferior and lateral ischemia. He underwent a subsequent left heart cath on 3/15/2021 that revealed no angiographic evidence of obstructive CAD, only nonobstructive CAD with ectatic RCA.  He completed an Echocardiogram on 4.14.21 that revealed an intact ejection fraction of 50 to 55%. (see full report below).     Patient continues to endorse stable dyspnea on exertion and feels it may also be related to his weight.  He denies any recent episodes of chest pain.  He denies palpitations or syncope.  He reports compliance with his medications.  He reports nightly compliance with his BiPAP and feels he is benefitting from use.      PFT testing July 14, 2021:  Normal PFT    TTE 4.14.21  Left ventricular ejection fraction is measured approximately 50 to 55%  Structurally normal mitral valve  Structurally aortic valve is trileaflet  Tricuspid valve is structurally normal  There is mild tricuspid regurgitation with RVSP estimated at 34 mmHg  Pulmonic valve is grossly normal  Pulmonic regurgitation present  Mildly dilated left atrium  Left ventricular ejection fraction is measured approximately 50 to 55%  Mildly dilated right atrium  Normal right ventricular size with preserved RV  function    Lexiscan stress test 2/25/2021:  Inferior and lateral ischemia  No scar    Echocardiogram January 24, 2017:  Normal left ventricular cavity with overall normal systolic function, EF measured at approximately 59%    Echocardiogram April 25, 2016:  Normal left ventricular cavity with overall normal systolic function-assessed at 63%. Mild MR. Normal aortic valve. Mild TR. Trace pulmonic regurgitation is present. Mildly dilated left atrium. E/A flow reversal noted. Suggestive of diastolic dysfunction. Mildly enlarged right atrium size. Normal right ventricular size with preserved RV function. No evidence of significant pericardial effusion is noted.    Lexiscan 2/23/16  Impression:  Normal myocardial perfusion study. Negative for ischemia. Mildly  reduced LVEF of 46%       Patient Active Problem List   Diagnosis    Coronary artery disease of native artery of native heart with stable angina pectoris    Hyperlipidemia LDL goal <70    Primary hypertension    Other chest pain    QUIROZ (dyspnea on exertion)    ANGELITA (obstructive sleep apnea)     Past Surgical History:   Procedure Laterality Date    APPENDECTOMY      blebectomy      COLONOSCOPY  07/25/2014    Dr Yariel Razo    HERNIA REPAIR       Social History     Socioeconomic History    Marital status:    Tobacco Use    Smoking status: Never    Smokeless tobacco: Never   Substance and Sexual Activity    Alcohol use: Not Currently    Drug use: Never        Family History   Problem Relation Age of Onset    Hypertension Mother     Heart failure Father     Hypertension Father     Heart failure Sister     Hypertension Sister     Heart attack Sister     Stroke Brother      Current Outpatient Medications:     aspirin (ECOTRIN) 81 MG EC tablet, Take 1 tablet (81 mg total) by mouth once daily., Disp: 90 tablet, Rfl: 3    atorvastatin (LIPITOR) 20 MG tablet, Take 1 tablet (20 mg total) by mouth once daily., Disp: 90 tablet, Rfl: 3    brimonidine 0.15 % OPTH DROP  (ALPHAGAN) 0.15 % ophthalmic solution, Place 1 drop into both eyes 3 (three) times daily., Disp: 10 mL, Rfl: 5    brimonidine 0.15 % OPTH DROP (ALPHAGAN) 0.15 % ophthalmic solution, Apply 1 drop to eye., Disp: , Rfl:     brinzolamide (AZOPT) 1 % ophthalmic suspension, Place 1 drop into both eyes 3 (three) times daily., Disp: 10 mL, Rfl: 5    carvediloL (COREG) 6.25 MG tablet, Take 1 tablet (6.25 mg total) by mouth 2 (two) times daily., Disp: 180 tablet, Rfl: 3    docusate sodium (COLACE) 100 MG capsule, 1 capsule as needed., Disp: , Rfl:     DULoxetine (CYMBALTA) 60 MG capsule, Take 60 mg by mouth once daily., Disp: , Rfl:     gabapentin (NEURONTIN) 600 MG tablet, Take 600 mg by mouth 3 (three) times daily., Disp: , Rfl:     HYDROcodone-acetaminophen (NORCO)  mg per tablet, 1 tablet as needed., Disp: , Rfl:     latanoprost 0.005 % ophthalmic solution, Place 1 drop into both eyes once daily., Disp: 2.5 mL, Rfl: 5    lisinopriL-hydrochlorothiazide (PRINZIDE,ZESTORETIC) 20-12.5 mg per tablet, Take 1 tablet by mouth once daily., Disp: 90 tablet, Rfl: 3    methocarbamoL (ROBAXIN) 750 MG Tab, Take 750 mg by mouth 3 (three) times daily., Disp: , Rfl:     nitroGLYCERIN (NITROSTAT) 0.4 MG SL tablet, Place 1 tablet (0.4 mg total) under the tongue every 5 (five) minutes as needed for Chest pain., Disp: 25 tablet, Rfl: 6    pantoprazole (PROTONIX) 40 MG tablet, Take 40 mg by mouth once daily., Disp: , Rfl:     tamsulosin (FLOMAX) 0.4 mg Cap, Take 1 capsule by mouth once daily., Disp: , Rfl:     traZODone (DESYREL) 50 MG tablet, Take 50 mg by mouth nightly., Disp: , Rfl:      ROS:                                                                                                                                                                             Review of Systems   Constitutional: Negative.    Eyes:         Visual impairment   Respiratory: Negative.     Cardiovascular: Negative.    Gastrointestinal: Negative.   "  Musculoskeletal: Negative.    Skin: Negative.    Neurological: Negative.    Psychiatric/Behavioral: Negative.        Blood pressure 113/67, pulse 61, temperature 98.1 °F (36.7 °C), resp. rate 18, height 5' 11" (1.803 m), weight 113.9 kg (251 lb), SpO2 97 %.     PE:  Physical Exam  Constitutional:       Appearance: Normal appearance.   HENT:      Head: Normocephalic.   Eyes:      Extraocular Movements: Extraocular movements intact.   Cardiovascular:      Rate and Rhythm: Normal rate and regular rhythm.   Pulmonary:      Effort: Pulmonary effort is normal.   Abdominal:      Palpations: Abdomen is soft.   Musculoskeletal:         General: Normal range of motion.      Cervical back: Neck supple.   Skin:     General: Skin is warm and dry.   Neurological:      General: No focal deficit present.      Mental Status: He is alert and oriented to person, place, and time.   Psychiatric:         Mood and Affect: Mood normal.     ASSESSMENT/PLAN:  Nonobstructive CAD  [facility] - 3.15.21 that revealed no angiographic evidence of obstructive CAD, only nonobstructive CAD with ectatic RCA (3.15.21)  LVEF - 50-55% per TTE 4.14.21  Lexiscan Stress Test 2.25.21: Inferior and lateral ischemia  Denies any recent episodes of chest pain  Continue Aspirin, Carvedilol, Lisinopril, Atorvastatin, and SL Nitro prn CP  Counseled on the importance of consuming a heart healthy diet and exercise as tolerated     QUIROZ - stable   LVEF - 50-55% per TTE 4.14.21  PFTs July 14, 2021 - normal    HTN  BP at goal 113/67  Continue Carvedilol and Lisinopril  Counseled on low salt diet     GERD  Management per PCP    ANGELITA  Patient reports nightly compliance with his BiPAP and feels he is benefitting from use   Recommend continued nightly BiPAP use    Hypercholesterolemia  LDL at goal - 40 (February 2022) - current order to complete updated FLP in computer   Continue atorvastatin 20 mg and Fish Oil BID  Counseled on the importance of obtaining a healthy BMI, and " consuming a heart healthy, low-cholesterol diet    Chronic Back Pain  Continue with pain management as directed    Obesity  Encouraged weight loss through diet and increased activity as tolerated - DASH diet discussed in detail     CMP, FLP in 6 months  Follow up in Cardiology Clinic in 6 months  Follow up with PCP as directed

## 2023-06-12 ENCOUNTER — TELEPHONE (OUTPATIENT)
Dept: CARDIOLOGY | Facility: CLINIC | Age: 68
End: 2023-06-12
Payer: MEDICARE

## 2023-06-12 NOTE — TELEPHONE ENCOUNTER
----- Message from Mariana YatesCullen Tejeda sent at 6/12/2023 10:02 AM CDT -----  Pt would like a nurse to gave him a call because of cp    _______________________________________________  Pt states he is having pain in the center of his chest that feels like cramping/burning. He states the pain lasts from 15-30 min, then he takes nitro. He had the pain mostly at night but is has occurred during the day. He does not  associate the pain with eating/meal times.  He states the pain is relieved with SL nitro x1. [facility]- Feb 2023 with SHAN Mao, [facility]- 08/09/23. Pt advised to go to ED for exam since no sooner appts are available at this time. He verbalizes understanding. Please advise if further action is needed. Thank you.

## 2023-06-13 NOTE — PROGRESS NOTES
CHIEF COMPLAINT:   Chief Complaint   Patient presents with    Follow-up     6 mos f/u chest pain x 3 days and almost felt like it wanted to start a few minutes ago                Review of patient's allergies indicates:   Allergen Reactions    Penicillins      Other reaction(s): Passed out                                          HPI:  Doug Faye 67 y.o. male with a past medical history of nonobstructive CAD-[facility] in 2010 showing non-obstructive CAD (LAD with 20% stenosis), HLD, HTN, obesity, ANGELITA, and GERD who presents for follow up and ongoing care. Patient completed PFTs in July 2021 which were normal. He completed a Lexiscan stress test on 2.25.21 which revealed inferior and lateral ischemia. He underwent a subsequent left heart cath on 3.15.21 that revealed no angiographic evidence of obstructive CAD, only nonobstructive CAD with ectatic RCA.  He completed an Echocardiogram on 4.14.21 that revealed an intact ejection fraction of 50 to 55%. (see full report below).     Today the patient reports recent symptoms of increased chest pain since last seen.  He describes the chest pain as a midsternal chest pressure/aching burning pain, nonradiating that occurs at random, with or without exertion.  He denies any associated or exacerbating symptoms.  He reports the chest pain may relieve on its own but sometimes requires sublingual nitroglycerin.  He endorses stable exertional dyspnea that has not progressed since last seen.  He denies any palpitations, orthopnea, PND, peripheral edema or syncope.  He reports compliance with his current medications and nightly compliance with BiPAP.      Cardiac Testing:  PFT testing July 14, 2021:  Normal PFT    TTE 4.14.21  Left ventricular ejection fraction is measured approximately 50 to 55%  Structurally normal mitral valve  Structurally aortic valve is trileaflet  Tricuspid valve is structurally normal  There is mild tricuspid regurgitation with RVSP estimated at 34 mmHg  Pulmonic  valve is grossly normal  Pulmonic regurgitation present  Mildly dilated left atrium  Left ventricular ejection fraction is measured approximately 50 to 55%  Mildly dilated right atrium  Normal right ventricular size with preserved RV function    Lexiscan stress test 2/25/2021:  Inferior and lateral ischemia  No scar    Echocardiogram January 24, 2017:  Normal left ventricular cavity with overall normal systolic function, EF measured at approximately 59%    Echocardiogram April 25, 2016:  Normal left ventricular cavity with overall normal systolic function-assessed at 63%. Mild MR. Normal aortic valve. Mild TR. Trace pulmonic regurgitation is present. Mildly dilated left atrium. E/A flow reversal noted. Suggestive of diastolic dysfunction. Mildly enlarged right atrium size. Normal right ventricular size with preserved RV function. No evidence of significant pericardial effusion is noted.    Lexiscan 2/23/16  Impression:  Normal myocardial perfusion study. Negative for ischemia. Mildly  reduced LVEF of 46%       Patient Active Problem List   Diagnosis    Coronary artery disease involving native coronary artery of native heart without angina pectoris    Hyperlipidemia LDL goal <70    Primary hypertension    Other chest pain    QUIROZ (dyspnea on exertion)    ANGELITA treated with BiPAP     Past Surgical History:   Procedure Laterality Date    APPENDECTOMY      blebectomy      COLONOSCOPY  07/25/2014    Dr Yariel Razo    HERNIA REPAIR       Social History     Socioeconomic History    Marital status:    Tobacco Use    Smoking status: Never    Smokeless tobacco: Never   Substance and Sexual Activity    Alcohol use: Not Currently    Drug use: Never        Family History   Problem Relation Age of Onset    Hypertension Mother     Heart failure Father     Hypertension Father     Heart failure Sister     Hypertension Sister     Heart attack Sister     Stroke Brother      Current Outpatient Medications:     aspirin (ECOTRIN)  81 MG EC tablet, Take 1 tablet (81 mg total) by mouth once daily., Disp: 90 tablet, Rfl: 3    atorvastatin (LIPITOR) 20 MG tablet, Take 1 tablet (20 mg total) by mouth once daily., Disp: 90 tablet, Rfl: 3    brimonidine 0.15 % OPTH DROP (ALPHAGAN) 0.15 % ophthalmic solution, Place 1 drop into both eyes 3 (three) times daily., Disp: 10 mL, Rfl: 5    brimonidine 0.15 % OPTH DROP (ALPHAGAN) 0.15 % ophthalmic solution, Apply 1 drop to eye., Disp: , Rfl:     brinzolamide (AZOPT) 1 % ophthalmic suspension, Place 1 drop into both eyes 3 (three) times daily., Disp: 10 mL, Rfl: 5    carvediloL (COREG) 6.25 MG tablet, Take 1 tablet (6.25 mg total) by mouth 2 (two) times daily., Disp: 180 tablet, Rfl: 3    docusate sodium (COLACE) 100 MG capsule, 1 capsule as needed., Disp: , Rfl:     DULoxetine (CYMBALTA) 60 MG capsule, Take 60 mg by mouth once daily., Disp: , Rfl:     gabapentin (NEURONTIN) 600 MG tablet, Take 600 mg by mouth 3 (three) times daily., Disp: , Rfl:     HYDROcodone-acetaminophen (NORCO)  mg per tablet, 1 tablet as needed., Disp: , Rfl:     latanoprost 0.005 % ophthalmic solution, Place 1 drop into both eyes once daily., Disp: 2.5 mL, Rfl: 5    lisinopriL-hydrochlorothiazide (PRINZIDE,ZESTORETIC) 20-12.5 mg per tablet, Take 1 tablet by mouth once daily., Disp: 90 tablet, Rfl: 3    methocarbamoL (ROBAXIN) 750 MG Tab, Take 750 mg by mouth 3 (three) times daily., Disp: , Rfl:     nitroGLYCERIN (NITROSTAT) 0.4 MG SL tablet, Place 1 tablet (0.4 mg total) under the tongue every 5 (five) minutes as needed for Chest pain., Disp: 25 tablet, Rfl: 6    pantoprazole (PROTONIX) 40 MG tablet, Take 40 mg by mouth once daily., Disp: , Rfl:     sildenafiL (VIAGRA) 100 MG tablet, sildenafil 100 mg tablet  TAKE ONE TABLET BY MOUTH ONE HALF HOUR PRIOR TO SEX, Disp: , Rfl:     tamsulosin (FLOMAX) 0.4 mg Cap, Take 1 capsule by mouth once daily., Disp: , Rfl:     traZODone (DESYREL) 50 MG tablet, Take 50 mg by mouth nightly.,  "Disp: , Rfl:      ROS:                                                                                                                                                                             Review of Systems   Constitutional: Negative.    Eyes:         Visual impairment   Respiratory:  Positive for shortness of breath.    Cardiovascular:  Positive for chest pain.   Gastrointestinal: Negative.    Musculoskeletal: Negative.    Skin: Negative.    Neurological: Negative.    Psychiatric/Behavioral: Negative.        Blood pressure 139/88, pulse 64, temperature 98.4 °F (36.9 °C), resp. rate 20, height 5' 11" (1.803 m), weight 116 kg (255 lb 11.7 oz), SpO2 100 %.     PE:  Physical Exam  Constitutional:       Appearance: Normal appearance.   HENT:      Head: Normocephalic.   Eyes:      Extraocular Movements: Extraocular movements intact.   Cardiovascular:      Rate and Rhythm: Normal rate and regular rhythm.   Pulmonary:      Effort: Pulmonary effort is normal.   Abdominal:      Palpations: Abdomen is soft.   Musculoskeletal:         General: Normal range of motion.      Cervical back: Neck supple.   Skin:     General: Skin is warm and dry.   Neurological:      General: No focal deficit present.      Mental Status: He is alert and oriented to person, place, and time.   Psychiatric:         Mood and Affect: Mood normal.     ASSESSMENT/PLAN:  Nonobstructive CAD  - C - 3.15.21 that revealed no angiographic evidence of obstructive CAD, only nonobstructive CAD with ectatic RCA (3.15.21)  - LVEF - 50-55% per TTE 4.14.21  - Lexiscan Stress Test 2.25.21: Inferior and lateral ischemia  - Reports intermittent mid sternal chest pressure, aching, and burning   - Continue Aspirin, Carvedilol, Lisinopril, Atorvastatin, and SL Nitro prn CP. Will add Imdur 30 mg once daily due to continued anginal symptoms of note, the patient reports that he is no longer taking the sildenafil.  Instructed patient to notify clinic if he restarted the " medication due to potential for further drops in blood pressure if combined.  - NV in 2-3 weeks for BP and symptom check   - Counseled on the importance of consuming a heart healthy diet and exercise as tolerated   - Will order nuclear stress test for further evaluation of ischemic symptoms.  Patient has known nonobstructive CAD and risk factors of HTN, obesity, HLD and family history of CAD.  Reports unable to walk on treadmill due to severe back pain   - Strict ED precautions  - EKG today - no acute ischemic changes    QUIROZ - stable   - LVEF - 50-55% per TTE 4.14.21  - PFTs July 14, 2021 - normal    HTN  - BP at goal   - Continue Carvedilol and Lisinopril, adding Imdur   - Counseled on low salt diet     GERD  - Management per PCP    ANGELITA  - Patient reports nightly compliance with his BiPAP and feels he is benefitting from use   - Recommend continued nightly BiPAP use    Hypercholesterolemia  - LDL 31, total cholesterol 78  - Continue Atorvastatin 20 MG and fish oil for now.  - Counseled on the importance of obtaining a healthy BMI, and consuming a heart healthy, low-cholesterol diet  - Obtain lipid panel today to reassess.  If total cholesterol remains decreased, will plan to decrease atorvastatin to 10 mg    Chronic Back Pain  - Continue with pain management as directed    Obesity  - Encouraged weight loss through diet and increased activity as tolerated - DASH diet discussed in detail     EKG  Nuclear stress test   Add Imdur 30 mg once daily   Nurse visit in 2-3  weeks for BP and symptom check   Lipid panel today  Follow up in Cardiology Clinic in 3 months or sooner pending results   Follow up with PCP as directed

## 2023-06-14 ENCOUNTER — OFFICE VISIT (OUTPATIENT)
Dept: CARDIOLOGY | Facility: CLINIC | Age: 68
End: 2023-06-14
Payer: MEDICARE

## 2023-06-14 VITALS
RESPIRATION RATE: 20 BRPM | TEMPERATURE: 98 F | SYSTOLIC BLOOD PRESSURE: 139 MMHG | DIASTOLIC BLOOD PRESSURE: 88 MMHG | BODY MASS INDEX: 35.81 KG/M2 | HEIGHT: 71 IN | WEIGHT: 255.75 LBS | OXYGEN SATURATION: 100 % | HEART RATE: 64 BPM

## 2023-06-14 DIAGNOSIS — I10 PRIMARY HYPERTENSION: ICD-10-CM

## 2023-06-14 DIAGNOSIS — I25.10 CORONARY ARTERY DISEASE INVOLVING NATIVE CORONARY ARTERY OF NATIVE HEART WITHOUT ANGINA PECTORIS: Primary | ICD-10-CM

## 2023-06-14 DIAGNOSIS — R07.89 OTHER CHEST PAIN: ICD-10-CM

## 2023-06-14 DIAGNOSIS — I25.118 CORONARY ARTERY DISEASE OF NATIVE ARTERY OF NATIVE HEART WITH STABLE ANGINA PECTORIS: ICD-10-CM

## 2023-06-14 DIAGNOSIS — E78.5 HYPERLIPIDEMIA LDL GOAL <70: ICD-10-CM

## 2023-06-14 DIAGNOSIS — R06.09 DOE (DYSPNEA ON EXERTION): ICD-10-CM

## 2023-06-14 LAB
CHOLEST SERPL-MCNC: 97 MG/DL
CHOLEST/HDLC SERPL: 4 {RATIO} (ref 0–5)
HDLC SERPL-MCNC: 27 MG/DL (ref 35–60)
LDLC SERPL CALC-MCNC: 25 MG/DL (ref 50–140)
TRIGL SERPL-MCNC: 226 MG/DL (ref 34–140)
VLDLC SERPL CALC-MCNC: 45 MG/DL

## 2023-06-14 PROCEDURE — 3008F BODY MASS INDEX DOCD: CPT | Mod: CPTII,,, | Performed by: NURSE PRACTITIONER

## 2023-06-14 PROCEDURE — 36415 COLL VENOUS BLD VENIPUNCTURE: CPT | Performed by: NURSE PRACTITIONER

## 2023-06-14 PROCEDURE — 3288F PR FALLS RISK ASSESSMENT DOCUMENTED: ICD-10-PCS | Mod: CPTII,,, | Performed by: NURSE PRACTITIONER

## 2023-06-14 PROCEDURE — 1126F AMNT PAIN NOTED NONE PRSNT: CPT | Mod: CPTII,,, | Performed by: NURSE PRACTITIONER

## 2023-06-14 PROCEDURE — 99214 OFFICE O/P EST MOD 30 MIN: CPT | Mod: S$PBB,,, | Performed by: NURSE PRACTITIONER

## 2023-06-14 PROCEDURE — 1159F PR MEDICATION LIST DOCUMENTED IN MEDICAL RECORD: ICD-10-PCS | Mod: CPTII,,, | Performed by: NURSE PRACTITIONER

## 2023-06-14 PROCEDURE — 80061 LIPID PANEL: CPT | Performed by: NURSE PRACTITIONER

## 2023-06-14 PROCEDURE — 3075F SYST BP GE 130 - 139MM HG: CPT | Mod: CPTII,,, | Performed by: NURSE PRACTITIONER

## 2023-06-14 PROCEDURE — 1159F MED LIST DOCD IN RCRD: CPT | Mod: CPTII,,, | Performed by: NURSE PRACTITIONER

## 2023-06-14 PROCEDURE — 93005 ELECTROCARDIOGRAM TRACING: CPT

## 2023-06-14 PROCEDURE — 1101F PT FALLS ASSESS-DOCD LE1/YR: CPT | Mod: CPTII,,, | Performed by: NURSE PRACTITIONER

## 2023-06-14 PROCEDURE — 4010F PR ACE/ARB THEARPY RXD/TAKEN: ICD-10-PCS | Mod: CPTII,,, | Performed by: NURSE PRACTITIONER

## 2023-06-14 PROCEDURE — 99215 OFFICE O/P EST HI 40 MIN: CPT | Mod: PBBFAC | Performed by: NURSE PRACTITIONER

## 2023-06-14 PROCEDURE — 3008F PR BODY MASS INDEX (BMI) DOCUMENTED: ICD-10-PCS | Mod: CPTII,,, | Performed by: NURSE PRACTITIONER

## 2023-06-14 PROCEDURE — 1160F RVW MEDS BY RX/DR IN RCRD: CPT | Mod: CPTII,,, | Performed by: NURSE PRACTITIONER

## 2023-06-14 PROCEDURE — 3288F FALL RISK ASSESSMENT DOCD: CPT | Mod: CPTII,,, | Performed by: NURSE PRACTITIONER

## 2023-06-14 PROCEDURE — 3079F DIAST BP 80-89 MM HG: CPT | Mod: CPTII,,, | Performed by: NURSE PRACTITIONER

## 2023-06-14 PROCEDURE — 4010F ACE/ARB THERAPY RXD/TAKEN: CPT | Mod: CPTII,,, | Performed by: NURSE PRACTITIONER

## 2023-06-14 PROCEDURE — 1101F PR PT FALLS ASSESS DOC 0-1 FALLS W/OUT INJ PAST YR: ICD-10-PCS | Mod: CPTII,,, | Performed by: NURSE PRACTITIONER

## 2023-06-14 PROCEDURE — 1126F PR PAIN SEVERITY QUANTIFIED, NO PAIN PRESENT: ICD-10-PCS | Mod: CPTII,,, | Performed by: NURSE PRACTITIONER

## 2023-06-14 PROCEDURE — 3079F PR MOST RECENT DIASTOLIC BLOOD PRESSURE 80-89 MM HG: ICD-10-PCS | Mod: CPTII,,, | Performed by: NURSE PRACTITIONER

## 2023-06-14 PROCEDURE — 3075F PR MOST RECENT SYSTOLIC BLOOD PRESS GE 130-139MM HG: ICD-10-PCS | Mod: CPTII,,, | Performed by: NURSE PRACTITIONER

## 2023-06-14 PROCEDURE — 99214 PR OFFICE/OUTPT VISIT, EST, LEVL IV, 30-39 MIN: ICD-10-PCS | Mod: S$PBB,,, | Performed by: NURSE PRACTITIONER

## 2023-06-14 PROCEDURE — 1160F PR REVIEW ALL MEDS BY PRESCRIBER/CLIN PHARMACIST DOCUMENTED: ICD-10-PCS | Mod: CPTII,,, | Performed by: NURSE PRACTITIONER

## 2023-06-14 RX ORDER — ISOSORBIDE MONONITRATE 30 MG/1
30 TABLET, EXTENDED RELEASE ORAL DAILY
Qty: 30 TABLET | Refills: 11 | Status: SHIPPED | OUTPATIENT
Start: 2023-06-14 | End: 2023-08-03 | Stop reason: SDUPTHER

## 2023-06-14 RX ORDER — CARVEDILOL 6.25 MG/1
6.25 TABLET ORAL 2 TIMES DAILY
Qty: 180 TABLET | Refills: 3 | Status: SHIPPED | OUTPATIENT
Start: 2023-06-14 | End: 2024-06-13

## 2023-06-14 RX ORDER — SILDENAFIL 100 MG/1
TABLET, FILM COATED ORAL
COMMUNITY
End: 2023-06-14

## 2023-06-14 RX ORDER — ASPIRIN 81 MG/1
81 TABLET ORAL DAILY
Qty: 90 TABLET | Refills: 3
Start: 2023-06-14 | End: 2024-06-13

## 2023-06-14 NOTE — PATIENT INSTRUCTIONS
EKG  Nuclear stress test   Add Imdur 30 mg once daily   Nurse visit in 2-3  weeks for BP and symptom check   Lipid panel today  Follow up in Cardiology Clinic in 3 months or sooner pending results   Follow up with PCP as directed

## 2023-07-11 ENCOUNTER — CLINICAL SUPPORT (OUTPATIENT)
Dept: OPHTHALMOLOGY | Facility: CLINIC | Age: 68
End: 2023-07-11
Payer: MEDICARE

## 2023-07-11 VITALS — HEIGHT: 71 IN | BODY MASS INDEX: 35.81 KG/M2 | WEIGHT: 255.75 LBS

## 2023-07-11 DIAGNOSIS — H40.1111 PRIMARY OPEN ANGLE GLAUCOMA (POAG) OF RIGHT EYE, MILD STAGE: ICD-10-CM

## 2023-07-11 DIAGNOSIS — H40.1123 PRIMARY OPEN ANGLE GLAUCOMA (POAG) OF LEFT EYE, SEVERE STAGE: Primary | ICD-10-CM

## 2023-07-11 DIAGNOSIS — H04.123 DRY EYE SYNDROME OF BOTH EYES: ICD-10-CM

## 2023-07-11 DIAGNOSIS — H47.20 OPTIC ATROPHY OF LEFT EYE: ICD-10-CM

## 2023-07-11 PROCEDURE — 92083 EXTENDED VISUAL FIELD XM: CPT | Mod: PBBFAC,PO | Performed by: OPHTHALMOLOGY

## 2023-07-11 PROCEDURE — 99213 OFFICE O/P EST LOW 20 MIN: CPT | Mod: PBBFAC,PO | Performed by: STUDENT IN AN ORGANIZED HEALTH CARE EDUCATION/TRAINING PROGRAM

## 2023-07-11 PROCEDURE — 92083 EXTENDED VISUAL FIELD XM: CPT | Mod: PBBFAC,PO | Performed by: STUDENT IN AN ORGANIZED HEALTH CARE EDUCATION/TRAINING PROGRAM

## 2023-07-11 PROCEDURE — 92133 CPTRZD OPH DX IMG PST SGM ON: CPT | Mod: PBBFAC,PO | Performed by: OPHTHALMOLOGY

## 2023-07-11 PROCEDURE — 92133 CPTRZD OPH DX IMG PST SGM ON: CPT | Mod: PBBFAC,PO | Performed by: STUDENT IN AN ORGANIZED HEALTH CARE EDUCATION/TRAINING PROGRAM

## 2023-07-11 RX ORDER — ERYTHROMYCIN 5 MG/G
OINTMENT OPHTHALMIC NIGHTLY
Qty: 3.5 G | Refills: 2 | Status: SHIPPED | OUTPATIENT
Start: 2023-07-11 | End: 2023-08-10

## 2023-07-11 NOTE — PROGRESS NOTES
[facility] 4 months OCT RNFL and HVF  Last edited by Latricia Sandoval MA on 7/11/2023  8:56 AM.          7/11/23  Assessment /Plan     HVF 24-2 7/11/23  OD: FL 0/10, FP 0%, FN 0%, reliable with possible early SAD but findings do not correlate with OCT  OS: FL 0/14, FP 0%, FN NA, reliable and globally depressed    OCT RNFL 7/11/23  OD: 84, all green   OS: 16, red SN, white IT- poor quality       For exam results, see Encounter Report.        Primary open angle glaucoma (POAG) of left eye, severe stage    Primary open angle glaucoma (POAG) of right eye, mild stage    Dry eye syndrome of both eyes    Optic atrophy of left eye    Other orders  -     fluorescein ophthalmic strip 1 each  -     erythromycin (ROMYCIN) ophthalmic ointment; Place into both eyes every evening.  Dispense: 3.5 g; Refill: 2          Testing     Tmax 28//38  //565    OCT RNFL 12/22/20  OD - 80, yellow S, remainder green  OS - poor quality, red S  OCT RNFL: 3/2017: 77//55 ( S, I red, T borderline, N green)  OCT RNFL: 1/2019 OD: 81 yellow S, otherwise green // unable OS    Gonio: 9/29/2020  OD: Open to   OS: Open to SS/  Gonio 12/2014: Open OU    HVF 24-2: 9/29/2020  OD: 1/10 losses, reliable, early superior arcuate defect  OS: 0/11 losses, total defect  HVF (2/18/15) -- Full OD/IAD OS  HVF 24-2 (3/2017): Full OD/ IAD +progression OS  HVF 24-2 5/13/19 OD only: full and reliable    B-scan OS 7/24/20 - flat and attached, no tears/detachments appreciated Assessment/Plan POAG (primary open-angle glaucoma) H40.1190   Ordered:   Clinic Follow up, *Est. 04/21/22 12:30:00 CDT, Order for future visit, POAG (primary open-angle glaucoma), [facility]      HVF 12/23/21:  OD: few scattered superior defects, grossly full   OS: generalized depression    OCT RNFL 12/23/21:  OD: 74, Red S, Yel I, green N/T  OS: poor quality scan     Assessment/Plan    1. s/p CEIOL + AGV OS 10/29/2020  - tapered (stopped) PF starting 4/22/21 for AC reaction  still seen at 12/22/20  - AC rxn today: trace cell OD, no flare  - Continue massage of left eye 3-4x/day for 3-4 seconds    2. Severe POAG OS  - s/p trab with multiple revisions, Due to persistent bleb leak with resultant hypotony with maculopathy s/p trab (11/18/17), trab revision x4 (2/21/18, 2/26/18, 4/3/18, 5/29/18)  - Timolol was discontinued as patient reports it gave him arrhythmias, so will not attempt trial off Timolol  - Continue Alphagan and Brinzolamide TID, Latanoprost QHS    3. Primary Open Angle Glaucoma OD, mild  - IOP stable; in fact likely not glaucomatous due to normal CD and IOP, although high risk  - Continue Alphagan and Brinzolamide TID, Latanoprost QHS  - Good IOP today - can consider SLT in the future if becomes uncontrolled  - 12/23/21: HVF and OCT stable    4. Ptosis OS  - Pt states that he only has noticed it since the surgery  - MRD1 0.5 OS  - Refer to Dr. Boyle after resolution of OS surgery    5. PCO s/p PCIOL OD  - Patient reporting glare sensitivity OD  - Potentially VS but surface very dry   - treat surface and return for yag cap eval OD if improved    6. Dry Eye Syndrome  - PFATs 6 times a day, WC/LS. Start erythromycin jean OD (ok to also use OU) for dense confluent PEEs   - Excess glaucoma drops in right eye may be contributing factor  - K check 1 week     7. Optic atrophy OS  - May be secondary to the hypotony in 2017/2018    RTC 1 week K check OD and possible yag cap eval OD

## 2023-07-19 ENCOUNTER — CLINICAL SUPPORT (OUTPATIENT)
Dept: OPHTHALMOLOGY | Facility: CLINIC | Age: 68
End: 2023-07-19
Payer: MEDICARE

## 2023-07-19 VITALS — BODY MASS INDEX: 35.81 KG/M2 | WEIGHT: 255.75 LBS | HEIGHT: 71 IN

## 2023-07-19 DIAGNOSIS — H26.491 PCO (POSTERIOR CAPSULAR OPACIFICATION), RIGHT: Primary | ICD-10-CM

## 2023-07-19 PROCEDURE — 66821 AFTER CATARACT LASER SURGERY: CPT | Mod: PBBFAC,PO | Performed by: STUDENT IN AN ORGANIZED HEALTH CARE EDUCATION/TRAINING PROGRAM

## 2023-07-19 PROCEDURE — 99213 OFFICE O/P EST LOW 20 MIN: CPT | Mod: PBBFAC,PO | Performed by: STUDENT IN AN ORGANIZED HEALTH CARE EDUCATION/TRAINING PROGRAM

## 2023-07-19 RX ORDER — DIPHENOXYLATE HYDROCHLORIDE AND ATROPINE SULFATE 2.5; .025 MG/1; MG/1
TABLET ORAL
COMMUNITY

## 2023-07-19 RX ORDER — PREDNISOLONE ACETATE 10 MG/ML
1 SUSPENSION/ DROPS OPHTHALMIC 4 TIMES DAILY
Qty: 5 ML | Refills: 0 | Status: SHIPPED | OUTPATIENT
Start: 2023-07-19 | End: 2023-07-23

## 2023-07-19 RX ORDER — LOPERAMIDE HYDROCHLORIDE 2 MG/1
CAPSULE ORAL
COMMUNITY

## 2023-07-19 RX ORDER — METOCLOPRAMIDE 10 MG/1
TABLET ORAL
COMMUNITY

## 2023-07-19 RX ORDER — SILDENAFIL 100 MG/1
TABLET, FILM COATED ORAL
COMMUNITY
Start: 2023-06-13 | End: 2023-08-03

## 2023-07-19 NOTE — PROGRESS NOTES
HPI    1 week K check OD and possible yag cap eval OD   Last edited by Latricia Sandoval MA on 7/19/2023  8:14 AM.          7/11/23  Assessment /Plan     HVF 24-2 7/11/23  OD: FL 0/10, FP 0%, FN 0%, reliable with possible early SAD but findings do not correlate with OCT  OS: FL 0/14, FP 0%, FN NA, reliable and globally depressed    OCT RNFL 7/11/23  OD: 84, all green   OS: 16, red SN, white IT- poor quality       For exam results, see Encounter Report.        There are no diagnoses linked to this encounter.        Testing     Tmax 28//38  //565    OCT RNFL 12/22/20  OD - 80, yellow S, remainder green  OS - poor quality, red S  OCT RNFL: 3/2017: 77//55 ( S, I red, T borderline, N green)  OCT RNFL: 1/2019 OD: 81 yellow S, otherwise green // unable OS    Gonio: 9/29/2020  OD: Open to   OS: Open to SS/  Gonio 12/2014: Open OU    HVF 24-2: 9/29/2020  OD: 1/10 losses, reliable, early superior arcuate defect  OS: 0/11 losses, total defect  HVF (2/18/15) -- Full OD/IAD OS  HVF 24-2 (3/2017): Full OD/ IAD +progression OS  HVF 24-2 5/13/19 OD only: full and reliable    B-scan OS 7/24/20 - flat and attached, no tears/detachments appreciated Assessment/Plan POAG (primary open-angle glaucoma) H40.1190   Ordered:   Clinic Follow up, *Est. 04/21/22 12:30:00 CDT, Order for future visit, POAG (primary open-angle glaucoma), [facility]      HVF 12/23/21:  OD: few scattered superior defects, grossly full   OS: generalized depression    OCT RNFL 12/23/21:  OD: 74, Red S, Yel I, green N/T  OS: poor quality scan     Assessment/Plan    1. s/p CEIOL + AGV OS 10/29/2020  - tapered (stopped) PF starting 4/22/21 for AC reaction still seen at 12/22/20  - Continue massage of left eye 3-4x/day for 3-4 seconds    2. Severe POAG OS  - s/p trab with multiple revisions, Due to persistent bleb leak with resultant hypotony with maculopathy s/p trab (11/18/17), trab revision x4 (2/21/18, 2/26/18, 4/3/18, 5/29/18)  - Timolol was  discontinued as patient reports it gave him arrhythmias, so will not attempt trial off Timolol  - Continue Alphagan and Brinzolamide TID, Latanoprost QHS    3. Primary Open Angle Glaucoma OD, mild  - IOP stable; in fact likely not glaucomatous due to normal CD and IOP, although high risk  - Continue Alphagan and Brinzolamide TID, Latanoprost QHS  - Good IOP today - can consider SLT in the future if becomes uncontrolled  - 12/23/21: HVF and OCT stable    4. Ptosis OS  - Pt states that he only has noticed it since the surgery  - MRD1 0.5 OS    5. PCO s/p PCIOL OD  - Patient reporting glare sensitivity OD  - Potentially VS but surface very dry   - 7/19/23: pt here for YAG eval. Proliferated epithelial cells w/I visual axis. Discussed RBA of yag, pt amenable, tolerated well. See below    6. Dry Eye Syndrome  - PFATs 6 times a day, WC/LS. Start erythromycin jean OD (ok to also use OU) for dense confluent PEEs   - Excess glaucoma drops in right eye may be contributing factor  - 7/19/23: surface improving, cont as above    7. Optic atrophy OS  - May be secondary to the hypotony in 2017/2018    RTC 1 week DFE OD only    PROCEDURE NOTE: YAG capsulotomy of right eye  - Indication: Visually significant posterior capsule opacification  - Risks, benefits, and alternatives discussed.  Patient voiced understanding, all questions were answered, and pt wished to proceed.  Consent was obtained  - The above indicated pupil was dilated using phenylephrine and tropicamide.   - Iopidine instilled 15-30 minutes prior to procedure  - A time out was performed including patient name, date of birth, and site for procedure  - An Deep lens with goniosol was utilized for procedure.  Laser shots (as below) were used to create a circular pattern to release the posterior capsule with good result   - Power: 3.3 mJ   - Shots: 30   - Total power: 100 mJ  - After the procedure, the eye was rinsed with eye wash.  Pt tolerated tolerated the procedure  well.  - Intraocular pressure checked 30 minutes after procedure, was 18 mmHg  - Patient was given a prescription for PredForte (prednisolone acetate 0.1%) to use in the affected eye QID x 4 days

## 2023-07-20 ENCOUNTER — TELEPHONE (OUTPATIENT)
Dept: OPHTHALMOLOGY | Facility: CLINIC | Age: 68
End: 2023-07-20
Payer: MEDICARE

## 2023-07-20 NOTE — TELEPHONE ENCOUNTER
Post procedure follow up call, Patient states he is doing well, verbalizes understanding of post procedure instructions. Aware to call clinic with any questions or concerns.

## 2023-07-24 ENCOUNTER — HOSPITAL ENCOUNTER (OUTPATIENT)
Dept: RADIOLOGY | Facility: HOSPITAL | Age: 68
Discharge: HOME | End: 2023-07-24
Attending: NURSE PRACTITIONER
Payer: MEDICARE

## 2023-07-24 ENCOUNTER — HOSPITAL ENCOUNTER (OUTPATIENT)
Dept: CARDIOLOGY | Facility: HOSPITAL | Age: 68
Discharge: HOME | End: 2023-07-24
Attending: NURSE PRACTITIONER
Payer: MEDICARE

## 2023-07-24 VITALS
HEIGHT: 71 IN | WEIGHT: 255.75 LBS | HEART RATE: 58 BPM | DIASTOLIC BLOOD PRESSURE: 74 MMHG | SYSTOLIC BLOOD PRESSURE: 123 MMHG | RESPIRATION RATE: 20 BRPM | BODY MASS INDEX: 35.81 KG/M2

## 2023-07-24 DIAGNOSIS — R07.89 OTHER CHEST PAIN: ICD-10-CM

## 2023-07-24 DIAGNOSIS — R06.09 DOE (DYSPNEA ON EXERTION): ICD-10-CM

## 2023-07-24 LAB
CV STRESS BASE HR: 62 BPM
DIASTOLIC BLOOD PRESSURE: 74 MMHG
NUC REST EJECTION FRACTION: 55
NUC STRESS EJECTION FRACTION: 63 %
OHS CV CPX 85 PERCENT MAX PREDICTED HEART RATE MALE: 128
OHS CV CPX ESTIMATED METS: 1
OHS CV CPX MAX PREDICTED HEART RATE: 151
OHS CV CPX PATIENT IS FEMALE: 0
OHS CV CPX PATIENT IS MALE: 1
OHS CV CPX PEAK DIASTOLIC BLOOD PRESSURE: 83 MMHG
OHS CV CPX PEAK HEAR RATE: 80 BPM
OHS CV CPX PEAK RATE PRESSURE PRODUCT: NORMAL
OHS CV CPX PEAK SYSTOLIC BLOOD PRESSURE: 129 MMHG
OHS CV CPX PERCENT MAX PREDICTED HEART RATE ACHIEVED: 53
OHS CV CPX RATE PRESSURE PRODUCT PRESENTING: 7626
STRESS ECHO POST EXERCISE DUR MIN: 0 MINUTES
STRESS ECHO POST EXERCISE DUR SEC: 57 SECONDS
SYSTOLIC BLOOD PRESSURE: 123 MMHG

## 2023-07-24 PROCEDURE — 93017 CV STRESS TEST TRACING ONLY: CPT

## 2023-07-24 PROCEDURE — 63600175 PHARM REV CODE 636 W HCPCS

## 2023-07-24 PROCEDURE — 78452 HT MUSCLE IMAGE SPECT MULT: CPT

## 2023-07-24 RX ORDER — REGADENOSON 0.08 MG/ML
INJECTION, SOLUTION INTRAVENOUS
Status: COMPLETED
Start: 2023-07-24 | End: 2023-07-24

## 2023-07-24 RX ADMIN — REGADENOSON 0.4 MG: 0.08 INJECTION, SOLUTION INTRAVENOUS at 08:07

## 2023-07-26 ENCOUNTER — OFFICE VISIT (OUTPATIENT)
Dept: OPHTHALMOLOGY | Facility: CLINIC | Age: 68
End: 2023-07-26
Payer: MEDICARE

## 2023-07-26 DIAGNOSIS — H40.1111 PRIMARY OPEN ANGLE GLAUCOMA (POAG) OF RIGHT EYE, MILD STAGE: ICD-10-CM

## 2023-07-26 DIAGNOSIS — H26.491 PCO (POSTERIOR CAPSULAR OPACIFICATION), RIGHT: Primary | ICD-10-CM

## 2023-07-26 PROCEDURE — 99213 OFFICE O/P EST LOW 20 MIN: CPT | Mod: PBBFAC,PO | Performed by: STUDENT IN AN ORGANIZED HEALTH CARE EDUCATION/TRAINING PROGRAM

## 2023-07-26 NOTE — PROGRESS NOTES
HPI    RTC 1 week DFE OD only  Patient states that on Sunday he noticed a grey cloud that is in his   vision now   Last edited by Fozia Mohamud MA on 7/26/2023 12:28 PM.        Testing     Tmax 28//38  //565    OCT RNFL 12/22/20  OD - 80, yellow S, remainder green  OS - poor quality, red S  OCT RNFL: 3/2017: 77//55 ( S, I red, T borderline, N green)  OCT RNFL: 1/2019 OD: 81 yellow S, otherwise green // unable OS    Gonio: 9/29/2020  OD: Open to   OS: Open to SS/  Gonio 12/2014: Open OU    HVF 24-2: 9/29/2020  OD: 1/10 losses, reliable, early superior arcuate defect  OS: 0/11 losses, total defect  HVF (2/18/15) -- Full OD/IAD OS  HVF 24-2 (3/2017): Full OD/ IAD +progression OS  HVF 24-2 5/13/19 OD only: full and reliable    B-scan OS 7/24/20 - flat and attached, no tears/detachments appreciated Assessment/Plan POAG (primary open-angle glaucoma) H40.1190   Ordered:   Clinic Follow up, *Est. 04/21/22 12:30:00 CDT, Order for future visit, POAG (primary open-angle glaucoma), [facility]      HVF 12/23/21:  OD: few scattered superior defects, grossly full   OS: generalized depression    OCT RNFL 12/23/21:  OD: 74, Red S, Yel I, green N/T  OS: poor quality scan     Assessment/Plan    1. s/p CEIOL + AGV OS 10/29/2020  - tapered (stopped) PF starting 4/22/21 for AC reaction still seen at 12/22/20  - Continue massage of left eye 3-4x/day for 3-4 seconds    2. Severe POAG OS  - s/p trab with multiple revisions, Due to persistent bleb leak with resultant hypotony with maculopathy s/p trab (11/18/17), trab revision x4 (2/21/18, 2/26/18, 4/3/18, 5/29/18)  - Timolol was discontinued as patient reports it gave him arrhythmias, so will not attempt trial off Timolol  - Continue Alphagan and Brinzolamide TID, Latanoprost QHS    3. Primary Open Angle Glaucoma OD, mild  - IOP stable; in fact likely not glaucomatous due to normal CD and IOP, although high risk  - Continue Alphagan and Brinzolamide TID, Latanoprost  QHS  - Good IOP today - can consider SLT in the future if becomes uncontrolled  - 12/23/21: HVF and OCT stable    4. Ptosis OS  - Pt states that he only has noticed it since the surgery  - MRD1 0.5 OS    5. PCO s/p PCIOL OD  - Patient reporting glare sensitivity OD  - Potentially VS but surface very dry   - 7/19/23: pt here for YAG eval. Proliferated epithelial cells w/I visual axis. Discussed RBA of yag, pt amenable, tolerated well. See below  - 7/26/23: doing well. BCVA 20/25    6. Dry Eye Syndrome  - PFATs 6 times a day, WC/LS. Start erythromycin jean OD (ok to also use OU) for dense confluent PEEs   - Excess glaucoma drops in right eye may be contributing factor  - 7/19/23: surface improving, cont as above    7. Optic atrophy OS  - May be secondary to the hypotony in 2017/2018    8. PVD OD  - RD precautions given    RTC 3 mo IOP

## 2023-08-03 ENCOUNTER — OFFICE VISIT (OUTPATIENT)
Dept: CARDIOLOGY | Facility: CLINIC | Age: 68
End: 2023-08-03
Payer: MEDICARE

## 2023-08-03 VITALS
HEIGHT: 71 IN | TEMPERATURE: 98 F | SYSTOLIC BLOOD PRESSURE: 139 MMHG | DIASTOLIC BLOOD PRESSURE: 85 MMHG | BODY MASS INDEX: 36.11 KG/M2 | RESPIRATION RATE: 18 BRPM | HEART RATE: 76 BPM | WEIGHT: 257.94 LBS | OXYGEN SATURATION: 100 %

## 2023-08-03 DIAGNOSIS — R06.09 DOE (DYSPNEA ON EXERTION): ICD-10-CM

## 2023-08-03 DIAGNOSIS — G47.33 OSA TREATED WITH BIPAP: ICD-10-CM

## 2023-08-03 DIAGNOSIS — R94.39 ABNORMAL STRESS TEST: ICD-10-CM

## 2023-08-03 DIAGNOSIS — I25.118 CORONARY ARTERY DISEASE OF NATIVE ARTERY OF NATIVE HEART WITH STABLE ANGINA PECTORIS: ICD-10-CM

## 2023-08-03 DIAGNOSIS — R07.89 OTHER CHEST PAIN: ICD-10-CM

## 2023-08-03 DIAGNOSIS — I10 PRIMARY HYPERTENSION: ICD-10-CM

## 2023-08-03 DIAGNOSIS — I25.10 CORONARY ARTERY DISEASE INVOLVING NATIVE CORONARY ARTERY OF NATIVE HEART WITHOUT ANGINA PECTORIS: Primary | ICD-10-CM

## 2023-08-03 DIAGNOSIS — E78.5 HYPERLIPIDEMIA LDL GOAL <70: ICD-10-CM

## 2023-08-03 PROCEDURE — 99214 OFFICE O/P EST MOD 30 MIN: CPT | Mod: S$PBB,,, | Performed by: NURSE PRACTITIONER

## 2023-08-03 PROCEDURE — 1160F PR REVIEW ALL MEDS BY PRESCRIBER/CLIN PHARMACIST DOCUMENTED: ICD-10-PCS | Mod: CPTII,,, | Performed by: NURSE PRACTITIONER

## 2023-08-03 PROCEDURE — 3079F PR MOST RECENT DIASTOLIC BLOOD PRESSURE 80-89 MM HG: ICD-10-PCS | Mod: CPTII,,, | Performed by: NURSE PRACTITIONER

## 2023-08-03 PROCEDURE — 4010F ACE/ARB THERAPY RXD/TAKEN: CPT | Mod: CPTII,,, | Performed by: NURSE PRACTITIONER

## 2023-08-03 PROCEDURE — 1160F RVW MEDS BY RX/DR IN RCRD: CPT | Mod: CPTII,,, | Performed by: NURSE PRACTITIONER

## 2023-08-03 PROCEDURE — 3079F DIAST BP 80-89 MM HG: CPT | Mod: CPTII,,, | Performed by: NURSE PRACTITIONER

## 2023-08-03 PROCEDURE — 99215 OFFICE O/P EST HI 40 MIN: CPT | Mod: PBBFAC | Performed by: NURSE PRACTITIONER

## 2023-08-03 PROCEDURE — 3008F BODY MASS INDEX DOCD: CPT | Mod: CPTII,,, | Performed by: NURSE PRACTITIONER

## 2023-08-03 PROCEDURE — 1101F PT FALLS ASSESS-DOCD LE1/YR: CPT | Mod: CPTII,,, | Performed by: NURSE PRACTITIONER

## 2023-08-03 PROCEDURE — 1101F PR PT FALLS ASSESS DOC 0-1 FALLS W/OUT INJ PAST YR: ICD-10-PCS | Mod: CPTII,,, | Performed by: NURSE PRACTITIONER

## 2023-08-03 PROCEDURE — 3288F FALL RISK ASSESSMENT DOCD: CPT | Mod: CPTII,,, | Performed by: NURSE PRACTITIONER

## 2023-08-03 PROCEDURE — 1159F PR MEDICATION LIST DOCUMENTED IN MEDICAL RECORD: ICD-10-PCS | Mod: CPTII,,, | Performed by: NURSE PRACTITIONER

## 2023-08-03 PROCEDURE — 4010F PR ACE/ARB THEARPY RXD/TAKEN: ICD-10-PCS | Mod: CPTII,,, | Performed by: NURSE PRACTITIONER

## 2023-08-03 PROCEDURE — 99214 PR OFFICE/OUTPT VISIT, EST, LEVL IV, 30-39 MIN: ICD-10-PCS | Mod: S$PBB,,, | Performed by: NURSE PRACTITIONER

## 2023-08-03 PROCEDURE — 3075F SYST BP GE 130 - 139MM HG: CPT | Mod: CPTII,,, | Performed by: NURSE PRACTITIONER

## 2023-08-03 PROCEDURE — 3008F PR BODY MASS INDEX (BMI) DOCUMENTED: ICD-10-PCS | Mod: CPTII,,, | Performed by: NURSE PRACTITIONER

## 2023-08-03 PROCEDURE — 3288F PR FALLS RISK ASSESSMENT DOCUMENTED: ICD-10-PCS | Mod: CPTII,,, | Performed by: NURSE PRACTITIONER

## 2023-08-03 PROCEDURE — 1159F MED LIST DOCD IN RCRD: CPT | Mod: CPTII,,, | Performed by: NURSE PRACTITIONER

## 2023-08-03 PROCEDURE — 3075F PR MOST RECENT SYSTOLIC BLOOD PRESS GE 130-139MM HG: ICD-10-PCS | Mod: CPTII,,, | Performed by: NURSE PRACTITIONER

## 2023-08-03 RX ORDER — DIAZEPAM 5 MG/1
5 TABLET ORAL
Status: ACTIVE | OUTPATIENT
Start: 2023-08-03

## 2023-08-03 RX ORDER — ISOSORBIDE MONONITRATE 60 MG/1
60 TABLET, EXTENDED RELEASE ORAL DAILY
Qty: 30 TABLET | Refills: 11 | Status: SHIPPED | OUTPATIENT
Start: 2023-08-03 | End: 2023-11-16

## 2023-08-03 RX ORDER — DIPHENHYDRAMINE HCL 25 MG
25 CAPSULE ORAL
Status: CANCELLED | OUTPATIENT
Start: 2023-08-03

## 2023-08-03 RX ORDER — ATORVASTATIN CALCIUM 20 MG/1
20 TABLET, FILM COATED ORAL DAILY
Qty: 90 TABLET | Refills: 3 | Status: SHIPPED | OUTPATIENT
Start: 2023-08-03 | End: 2024-08-02

## 2023-08-03 RX ORDER — SODIUM CHLORIDE 9 MG/ML
INJECTION, SOLUTION INTRAVENOUS ONCE
Status: CANCELLED | OUTPATIENT
Start: 2023-08-03 | End: 2023-08-03

## 2023-08-03 NOTE — PROGRESS NOTES
CHIEF COMPLAINT:   Chief Complaint   Patient presents with    F/U with stress results.      Pt c/o chest pain/pressure at least qod relieved with NTG x 1.                 Review of patient's allergies indicates:   Allergen Reactions    Penicillins      Other reaction(s): Passed out                                          HPI:  Doug Faye 67 y.o. male with a past medical history of nonobstructive CAD, HLD, HTN, obesity, ANGELITA/BiPAP, and GERD  presents for follow up and results of testing.  At his last clinic visit, the patient endorsed increased chest pain that he described as a mid sternal pressure/burning pain that occurred at random, with or without exertion.  Subsequent nuclear stress test obtained on 7.24.23 was abnormal revealing a moderate-sized, moderate intensity reversible perfusion abnormality consistent with ischemia in RCA territory.  The patient underwent a previous left heart catheterization on 3.15.21 that revealed nonobstructive CAD with ectatic RCA.  Latest echocardiogram completed on 4.14.21 revealed preserved EF of 50-55% (see reports below).     Patient presents today endorsing increased chest pain despite addition of Imdur, stating the chest pain has increased in severity when it previously resolved after a few seconds.  The patient describes the chest pain as a midsternal pressure that occurs at random, every other day and is typically relieved with sublingual nitroglycerin.  He also endorses increased exertional dyspnea, but states he is able to perform his normal ADLs, but states moderate activity induces symptoms.  He denies any palpitations, orthopnea, PND, peripheral edema or syncope.  He remains compliant with his current medications and is tolerating well.  He endorses nightly compliance with CPAP and is benefitting from use.        Cardiac Testing:  Nuclear Stress Test    Abnormal myocardial perfusion scan.    There is a moderate intensity, moderate sized, reversible perfusion  abnormality that is consistent with ischemia in the basal to mid inferolateral wall(s) in the typical distribution of the RCA territory.    There are no other significant perfusion abnormalities.    The gated perfusion images showed an ejection fraction of 55% at rest. The gated perfusion images showed an ejection fraction of 63% post stress.    The patient reported no chest pain during the stress test.    There were no arrhythmias during stress.     On the nuclear stress test the EF is normal.  If the patient has an echo, the EF on the echo is considered more accurate.         The patient has a moderate risk nuclear stress test.      If clinically indicated, consider further evaluation with coronary angiogram.    PFT testing July 14, 2021:  Normal PFT    TTE 4.14.21  Left ventricular ejection fraction is measured approximately 50 to 55%  Structurally normal mitral valve  Structurally aortic valve is trileaflet  Tricuspid valve is structurally normal  There is mild tricuspid regurgitation with RVSP estimated at 34 mmHg  Pulmonic valve is grossly normal  Pulmonic regurgitation present  Mildly dilated left atrium  Left ventricular ejection fraction is measured approximately 50 to 55%  Mildly dilated right atrium  Normal right ventricular size with preserved RV function    Lexiscan stress test 2/25/2021:  Inferior and lateral ischemia  No scar    Echocardiogram January 24, 2017:  Normal left ventricular cavity with overall normal systolic function, EF measured at approximately 59%    Echocardiogram April 25, 2016:  Normal left ventricular cavity with overall normal systolic function-assessed at 63%. Mild MR. Normal aortic valve. Mild TR. Trace pulmonic regurgitation is present. Mildly dilated left atrium. E/A flow reversal noted. Suggestive of diastolic dysfunction. Mildly enlarged right atrium size. Normal right ventricular size with preserved RV function. No evidence of significant pericardial effusion is  noted.    Lexiscan 2/23/16  Impression:  Normal myocardial perfusion study. Negative for ischemia. Mildly  reduced LVEF of 46%       Patient Active Problem List   Diagnosis    Coronary artery disease involving native coronary artery of native heart without angina pectoris    Hyperlipidemia LDL goal <70    Primary hypertension    Other chest pain    QUIROZ (dyspnea on exertion)    ANGELITA treated with BiPAP    PCO (posterior capsular opacification), right    Primary open angle glaucoma (POAG) of right eye, mild stage     Past Surgical History:   Procedure Laterality Date    APPENDECTOMY      blebectomy      COLONOSCOPY  07/25/2014    Dr Yariel Razo    HERNIA REPAIR       Social History     Socioeconomic History    Marital status:    Tobacco Use    Smoking status: Never    Smokeless tobacco: Never   Substance and Sexual Activity    Alcohol use: Not Currently    Drug use: Never        Family History   Problem Relation Age of Onset    Hypertension Mother     Heart failure Father     Hypertension Father     Heart failure Sister     Hypertension Sister     Heart attack Sister     Stroke Brother      Current Outpatient Medications:     aspirin (ECOTRIN) 81 MG EC tablet, Take 1 tablet (81 mg total) by mouth once daily., Disp: 90 tablet, Rfl: 3    brimonidine 0.15 % OPTH DROP (ALPHAGAN) 0.15 % ophthalmic solution, Place 1 drop into both eyes 3 (three) times daily., Disp: 10 mL, Rfl: 5    brimonidine 0.15 % OPTH DROP (ALPHAGAN) 0.15 % ophthalmic solution, Apply 1 drop to eye., Disp: , Rfl:     brinzolamide (AZOPT) 1 % ophthalmic suspension, Place 1 drop into both eyes 3 (three) times daily., Disp: 10 mL, Rfl: 5    carvediloL (COREG) 6.25 MG tablet, Take 1 tablet (6.25 mg total) by mouth 2 (two) times daily., Disp: 180 tablet, Rfl: 3    diphenoxylate-atropine 2.5-0.025 mg (LOMOTIL) 2.5-0.025 mg per tablet, , Disp: , Rfl:     docusate sodium (COLACE) 100 MG capsule, 1 capsule as needed., Disp: , Rfl:     DULoxetine  (CYMBALTA) 60 MG capsule, Take 60 mg by mouth once daily., Disp: , Rfl:     erythromycin (ROMYCIN) ophthalmic ointment, Place into both eyes every evening., Disp: 3.5 g, Rfl: 2    gabapentin (NEURONTIN) 600 MG tablet, Take 600 mg by mouth 3 (three) times daily., Disp: , Rfl:     HYDROcodone-acetaminophen (NORCO)  mg per tablet, 1 tablet as needed., Disp: , Rfl:     isosorbide mononitrate (IMDUR) 30 MG 24 hr tablet, Take 1 tablet (30 mg total) by mouth once daily., Disp: 30 tablet, Rfl: 11    latanoprost 0.005 % ophthalmic solution, Place 1 drop into both eyes once daily., Disp: 2.5 mL, Rfl: 5    lisinopriL-hydrochlorothiazide (PRINZIDE,ZESTORETIC) 20-12.5 mg per tablet, Take 1 tablet by mouth once daily., Disp: 90 tablet, Rfl: 3    methocarbamoL (ROBAXIN) 750 MG Tab, Take 750 mg by mouth 3 (three) times daily., Disp: , Rfl:     nitroGLYCERIN (NITROSTAT) 0.4 MG SL tablet, Place 1 tablet (0.4 mg total) under the tongue every 5 (five) minutes as needed for Chest pain., Disp: 25 tablet, Rfl: 6    pantoprazole (PROTONIX) 40 MG tablet, Take 40 mg by mouth once daily., Disp: , Rfl:     sildenafiL (VIAGRA) 100 MG tablet, TAKE ONE TABLET BY MOUTH ONE HALF HOUR PRIOR TO SEX, Disp: , Rfl:     tamsulosin (FLOMAX) 0.4 mg Cap, Take 1 capsule by mouth once daily., Disp: , Rfl:     traZODone (DESYREL) 50 MG tablet, Take 50 mg by mouth nightly., Disp: , Rfl:     atorvastatin (LIPITOR) 20 MG tablet, Take 1 tablet (20 mg total) by mouth once daily., Disp: 90 tablet, Rfl: 3    loperamide (IMODIUM) 2 mg capsule, , Disp: , Rfl:     metoclopramide HCl (REGLAN) 10 MG tablet, , Disp: , Rfl:      ROS:                                                                                                                                                                             Review of Systems   Constitutional: Negative.    Eyes:         Visual impairment   Respiratory:  Positive for shortness of breath.    Cardiovascular:  Positive for  "chest pain.   Gastrointestinal: Negative.    Musculoskeletal: Negative.    Skin: Negative.    Neurological: Negative.    Psychiatric/Behavioral: Negative.          Blood pressure 139/85, pulse 76, temperature 98.2 °F (36.8 °C), resp. rate 18, height 5' 11" (1.803 m), weight 117 kg (257 lb 15 oz), SpO2 100 %.     PE:  Physical Exam  Constitutional:       Appearance: Normal appearance.   HENT:      Head: Normocephalic.   Eyes:      Extraocular Movements: Extraocular movements intact.   Cardiovascular:      Rate and Rhythm: Normal rate and regular rhythm.   Pulmonary:      Effort: Pulmonary effort is normal.   Abdominal:      Palpations: Abdomen is soft.   Musculoskeletal:         General: Normal range of motion.      Cervical back: Neck supple.   Skin:     General: Skin is warm and dry.   Neurological:      General: No focal deficit present.      Mental Status: He is alert and oriented to person, place, and time.   Psychiatric:         Mood and Affect: Mood normal.       ASSESSMENT/PLAN:  Abnormal Stress Test   Chest pain - typical features  -  moderate intensity, moderate sized, reversible perfusion abnormality that is consistent with ischemia in the basal to mid inferolateral wall(s) in the typical distribution of the RCA territory.(7.24.23)  - Reports continued CP that has increased in severity, now occurring every other day. Relieved with SL nitro prn. Endorses increased QUIROZ  - Continue ASA, Coreg 6.25 mg BID, lisinopril, atorvastatin, and SL nitro prn CP. Increase Imdur to 60 mg due to continued anginal symptoms   - Schedule left heart catheterization.  Patient continues have increased ischemic symptoms despite increase antianginals.  Has known CAD and risk factors of HTN, HLD, obesity and a family history of CAD.  - Discussed risks/benefits alternatives with the patient and they are agreeable to proceed  - Strict ED precautions provided     Nonobstructive CAD  - [facility] - 3.15.21 that revealed nonobstructive CAD " with ectatic RCA (3.15.21)  - LVEF - 50-55% per TTE 4.14.21  - Continue Aspirin, Carvedilol, Lisinopril, Atorvastatin, imdur (increased dose) and SL Nitro prn CP.   - Counseled on the importance of consuming a heart healthy diet and exercise as tolerated     QUIROZ -   - reports increased exertional dyspnea  - LVEF - 50-55% per TTE 4.14.21  - PFTs July 14, 2021 - normal    HTN  - BP at goal   - Continue Carvedilol and Lisinopril, Imdur (increased dose)  - Counseled on low salt diet     GERD  - Management per PCP    ANGELITA/BiPAP  - Patient reports nightly compliance with his BiPAP and feels he is benefitting from use   - Recommend continued nightly BiPAP use    Hypercholesterolemia  - LDL -27-at goal   - Continue Atorvastatin 20 MG and fish oil   - Counseled on the importance of obtaining a healthy BMI, and consuming a heart healthy, low-cholesterol diet    Chronic Back Pain  - Continue with pain management as directed    Obesity  - Encouraged weight loss through diet and increased activity as tolerated - DASH diet discussed in detail     Increase Imdur to 60 mg due to continued anginal symptoms   Schedule left heart catheterization  Follow up in Cardiology Clinic postprocedure  Follow up with PCP as directed  Strict ED precautions

## 2023-08-03 NOTE — H&P (VIEW-ONLY)
CHIEF COMPLAINT:   Chief Complaint   Patient presents with    F/U with stress results.      Pt c/o chest pain/pressure at least qod relieved with NTG x 1.                 Review of patient's allergies indicates:   Allergen Reactions    Penicillins      Other reaction(s): Passed out                                          HPI:  Doug Faye 67 y.o. male with a past medical history of nonobstructive CAD, HLD, HTN, obesity, ANGELITA/BiPAP, and GERD  presents for follow up and results of testing.  At his last clinic visit, the patient endorsed increased chest pain that he described as a mid sternal pressure/burning pain that occurred at random, with or without exertion.  Subsequent nuclear stress test obtained on 7.24.23 was abnormal revealing a moderate-sized, moderate intensity reversible perfusion abnormality consistent with ischemia in RCA territory.  The patient underwent a previous left heart catheterization on 3.15.21 that revealed nonobstructive CAD with ectatic RCA.  Latest echocardiogram completed on 4.14.21 revealed preserved EF of 50-55% (see reports below).     Patient presents today endorsing increased chest pain despite addition of Imdur, stating the chest pain has increased in severity when it previously resolved after a few seconds.  The patient describes the chest pain as a midsternal pressure that occurs at random, every other day and is typically relieved with sublingual nitroglycerin.  He also endorses increased exertional dyspnea, but states he is able to perform his normal ADLs, but states moderate activity induces symptoms.  He denies any palpitations, orthopnea, PND, peripheral edema or syncope.  He remains compliant with his current medications and is tolerating well.  He endorses nightly compliance with CPAP and is benefitting from use.        Cardiac Testing:  Nuclear Stress Test    Abnormal myocardial perfusion scan.    There is a moderate intensity, moderate sized, reversible perfusion  abnormality that is consistent with ischemia in the basal to mid inferolateral wall(s) in the typical distribution of the RCA territory.    There are no other significant perfusion abnormalities.    The gated perfusion images showed an ejection fraction of 55% at rest. The gated perfusion images showed an ejection fraction of 63% post stress.    The patient reported no chest pain during the stress test.    There were no arrhythmias during stress.     On the nuclear stress test the EF is normal.  If the patient has an echo, the EF on the echo is considered more accurate.         The patient has a moderate risk nuclear stress test.      If clinically indicated, consider further evaluation with coronary angiogram.    PFT testing July 14, 2021:  Normal PFT    TTE 4.14.21  Left ventricular ejection fraction is measured approximately 50 to 55%  Structurally normal mitral valve  Structurally aortic valve is trileaflet  Tricuspid valve is structurally normal  There is mild tricuspid regurgitation with RVSP estimated at 34 mmHg  Pulmonic valve is grossly normal  Pulmonic regurgitation present  Mildly dilated left atrium  Left ventricular ejection fraction is measured approximately 50 to 55%  Mildly dilated right atrium  Normal right ventricular size with preserved RV function    Lexiscan stress test 2/25/2021:  Inferior and lateral ischemia  No scar    Echocardiogram January 24, 2017:  Normal left ventricular cavity with overall normal systolic function, EF measured at approximately 59%    Echocardiogram April 25, 2016:  Normal left ventricular cavity with overall normal systolic function-assessed at 63%. Mild MR. Normal aortic valve. Mild TR. Trace pulmonic regurgitation is present. Mildly dilated left atrium. E/A flow reversal noted. Suggestive of diastolic dysfunction. Mildly enlarged right atrium size. Normal right ventricular size with preserved RV function. No evidence of significant pericardial effusion is  noted.    Lexiscan 2/23/16  Impression:  Normal myocardial perfusion study. Negative for ischemia. Mildly  reduced LVEF of 46%       Patient Active Problem List   Diagnosis    Coronary artery disease involving native coronary artery of native heart without angina pectoris    Hyperlipidemia LDL goal <70    Primary hypertension    Other chest pain    QUIROZ (dyspnea on exertion)    ANGELITA treated with BiPAP    PCO (posterior capsular opacification), right    Primary open angle glaucoma (POAG) of right eye, mild stage     Past Surgical History:   Procedure Laterality Date    APPENDECTOMY      blebectomy      COLONOSCOPY  07/25/2014    Dr Yariel Rzao    HERNIA REPAIR       Social History     Socioeconomic History    Marital status:    Tobacco Use    Smoking status: Never    Smokeless tobacco: Never   Substance and Sexual Activity    Alcohol use: Not Currently    Drug use: Never        Family History   Problem Relation Age of Onset    Hypertension Mother     Heart failure Father     Hypertension Father     Heart failure Sister     Hypertension Sister     Heart attack Sister     Stroke Brother      Current Outpatient Medications:     aspirin (ECOTRIN) 81 MG EC tablet, Take 1 tablet (81 mg total) by mouth once daily., Disp: 90 tablet, Rfl: 3    brimonidine 0.15 % OPTH DROP (ALPHAGAN) 0.15 % ophthalmic solution, Place 1 drop into both eyes 3 (three) times daily., Disp: 10 mL, Rfl: 5    brimonidine 0.15 % OPTH DROP (ALPHAGAN) 0.15 % ophthalmic solution, Apply 1 drop to eye., Disp: , Rfl:     brinzolamide (AZOPT) 1 % ophthalmic suspension, Place 1 drop into both eyes 3 (three) times daily., Disp: 10 mL, Rfl: 5    carvediloL (COREG) 6.25 MG tablet, Take 1 tablet (6.25 mg total) by mouth 2 (two) times daily., Disp: 180 tablet, Rfl: 3    diphenoxylate-atropine 2.5-0.025 mg (LOMOTIL) 2.5-0.025 mg per tablet, , Disp: , Rfl:     docusate sodium (COLACE) 100 MG capsule, 1 capsule as needed., Disp: , Rfl:     DULoxetine  (CYMBALTA) 60 MG capsule, Take 60 mg by mouth once daily., Disp: , Rfl:     erythromycin (ROMYCIN) ophthalmic ointment, Place into both eyes every evening., Disp: 3.5 g, Rfl: 2    gabapentin (NEURONTIN) 600 MG tablet, Take 600 mg by mouth 3 (three) times daily., Disp: , Rfl:     HYDROcodone-acetaminophen (NORCO)  mg per tablet, 1 tablet as needed., Disp: , Rfl:     isosorbide mononitrate (IMDUR) 30 MG 24 hr tablet, Take 1 tablet (30 mg total) by mouth once daily., Disp: 30 tablet, Rfl: 11    latanoprost 0.005 % ophthalmic solution, Place 1 drop into both eyes once daily., Disp: 2.5 mL, Rfl: 5    lisinopriL-hydrochlorothiazide (PRINZIDE,ZESTORETIC) 20-12.5 mg per tablet, Take 1 tablet by mouth once daily., Disp: 90 tablet, Rfl: 3    methocarbamoL (ROBAXIN) 750 MG Tab, Take 750 mg by mouth 3 (three) times daily., Disp: , Rfl:     nitroGLYCERIN (NITROSTAT) 0.4 MG SL tablet, Place 1 tablet (0.4 mg total) under the tongue every 5 (five) minutes as needed for Chest pain., Disp: 25 tablet, Rfl: 6    pantoprazole (PROTONIX) 40 MG tablet, Take 40 mg by mouth once daily., Disp: , Rfl:     sildenafiL (VIAGRA) 100 MG tablet, TAKE ONE TABLET BY MOUTH ONE HALF HOUR PRIOR TO SEX, Disp: , Rfl:     tamsulosin (FLOMAX) 0.4 mg Cap, Take 1 capsule by mouth once daily., Disp: , Rfl:     traZODone (DESYREL) 50 MG tablet, Take 50 mg by mouth nightly., Disp: , Rfl:     atorvastatin (LIPITOR) 20 MG tablet, Take 1 tablet (20 mg total) by mouth once daily., Disp: 90 tablet, Rfl: 3    loperamide (IMODIUM) 2 mg capsule, , Disp: , Rfl:     metoclopramide HCl (REGLAN) 10 MG tablet, , Disp: , Rfl:      ROS:                                                                                                                                                                             Review of Systems   Constitutional: Negative.    Eyes:         Visual impairment   Respiratory:  Positive for shortness of breath.    Cardiovascular:  Positive for  "chest pain.   Gastrointestinal: Negative.    Musculoskeletal: Negative.    Skin: Negative.    Neurological: Negative.    Psychiatric/Behavioral: Negative.          Blood pressure 139/85, pulse 76, temperature 98.2 °F (36.8 °C), resp. rate 18, height 5' 11" (1.803 m), weight 117 kg (257 lb 15 oz), SpO2 100 %.     PE:  Physical Exam  Constitutional:       Appearance: Normal appearance.   HENT:      Head: Normocephalic.   Eyes:      Extraocular Movements: Extraocular movements intact.   Cardiovascular:      Rate and Rhythm: Normal rate and regular rhythm.   Pulmonary:      Effort: Pulmonary effort is normal.   Abdominal:      Palpations: Abdomen is soft.   Musculoskeletal:         General: Normal range of motion.      Cervical back: Neck supple.   Skin:     General: Skin is warm and dry.   Neurological:      General: No focal deficit present.      Mental Status: He is alert and oriented to person, place, and time.   Psychiatric:         Mood and Affect: Mood normal.       ASSESSMENT/PLAN:  Abnormal Stress Test   Chest pain - typical features  -  moderate intensity, moderate sized, reversible perfusion abnormality that is consistent with ischemia in the basal to mid inferolateral wall(s) in the typical distribution of the RCA territory.(7.24.23)  - Reports continued CP that has increased in severity, now occurring every other day. Relieved with SL nitro prn. Endorses increased QUIROZ  - Continue ASA, Coreg 6.25 mg BID, lisinopril, atorvastatin, and SL nitro prn CP. Increase Imdur to 60 mg due to continued anginal symptoms   - Schedule left heart catheterization.  Patient continues have increased ischemic symptoms despite increase antianginals.  Has known CAD and risk factors of HTN, HLD, obesity and a family history of CAD.  - Discussed risks/benefits alternatives with the patient and they are agreeable to proceed  - Strict ED precautions provided     Nonobstructive CAD  - [facility] - 3.15.21 that revealed nonobstructive CAD " with ectatic RCA (3.15.21)  - LVEF - 50-55% per TTE 4.14.21  - Continue Aspirin, Carvedilol, Lisinopril, Atorvastatin, imdur (increased dose) and SL Nitro prn CP.   - Counseled on the importance of consuming a heart healthy diet and exercise as tolerated     QUIROZ -   - reports increased exertional dyspnea  - LVEF - 50-55% per TTE 4.14.21  - PFTs July 14, 2021 - normal    HTN  - BP at goal   - Continue Carvedilol and Lisinopril, Imdur (increased dose)  - Counseled on low salt diet     GERD  - Management per PCP    ANGELITA/BiPAP  - Patient reports nightly compliance with his BiPAP and feels he is benefitting from use   - Recommend continued nightly BiPAP use    Hypercholesterolemia  - LDL -27-at goal   - Continue Atorvastatin 20 MG and fish oil   - Counseled on the importance of obtaining a healthy BMI, and consuming a heart healthy, low-cholesterol diet    Chronic Back Pain  - Continue with pain management as directed    Obesity  - Encouraged weight loss through diet and increased activity as tolerated - DASH diet discussed in detail     Increase Imdur to 60 mg due to continued anginal symptoms   Schedule left heart catheterization  Follow up in Cardiology Clinic postprocedure  Follow up with PCP as directed  Strict ED precautions

## 2023-08-03 NOTE — PATIENT INSTRUCTIONS
Increase Imdur to 60 mg due to continued anginal symptoms     Schedule left heart catheterization    Follow up in Cardiology Clinic postprocedure    Follow up with PCP as directed    Strict ED precautions

## 2023-08-15 ENCOUNTER — CLINICAL SUPPORT (OUTPATIENT)
Dept: CARDIOLOGY | Facility: CLINIC | Age: 68
End: 2023-08-15
Payer: MEDICARE

## 2023-08-15 VITALS
HEIGHT: 71 IN | RESPIRATION RATE: 20 BRPM | WEIGHT: 246.94 LBS | SYSTOLIC BLOOD PRESSURE: 123 MMHG | DIASTOLIC BLOOD PRESSURE: 73 MMHG | HEART RATE: 67 BPM | OXYGEN SATURATION: 96 % | BODY MASS INDEX: 34.57 KG/M2

## 2023-08-15 DIAGNOSIS — R07.89 OTHER CHEST PAIN: Primary | ICD-10-CM

## 2023-08-15 PROCEDURE — 99214 OFFICE O/P EST MOD 30 MIN: CPT | Mod: PBBFAC

## 2023-08-21 ENCOUNTER — HOSPITAL ENCOUNTER (OUTPATIENT)
Facility: HOSPITAL | Age: 68
Discharge: HOME | End: 2023-08-21
Attending: INTERNAL MEDICINE | Admitting: INTERNAL MEDICINE
Payer: MEDICARE

## 2023-08-21 DIAGNOSIS — E78.5 HYPERLIPIDEMIA LDL GOAL <70: ICD-10-CM

## 2023-08-21 DIAGNOSIS — I10 PRIMARY HYPERTENSION: ICD-10-CM

## 2023-08-21 DIAGNOSIS — R07.89 OTHER CHEST PAIN: ICD-10-CM

## 2023-08-21 DIAGNOSIS — I25.118 CORONARY ARTERY DISEASE OF NATIVE ARTERY OF NATIVE HEART WITH STABLE ANGINA PECTORIS: ICD-10-CM

## 2023-08-21 DIAGNOSIS — R94.39 ABNORMAL STRESS TEST: ICD-10-CM

## 2023-08-21 DIAGNOSIS — I25.10 CORONARY ARTERY DISEASE INVOLVING NATIVE CORONARY ARTERY OF NATIVE HEART WITHOUT ANGINA PECTORIS: ICD-10-CM

## 2023-08-21 DIAGNOSIS — G47.33 OSA TREATED WITH BIPAP: ICD-10-CM

## 2023-08-21 DIAGNOSIS — R06.09 DOE (DYSPNEA ON EXERTION): ICD-10-CM

## 2023-08-21 PROCEDURE — 25000003 PHARM REV CODE 250: Performed by: INTERNAL MEDICINE

## 2023-08-21 PROCEDURE — C1894 INTRO/SHEATH, NON-LASER: HCPCS | Performed by: INTERNAL MEDICINE

## 2023-08-21 PROCEDURE — 63600175 PHARM REV CODE 636 W HCPCS: Performed by: INTERNAL MEDICINE

## 2023-08-21 PROCEDURE — 25500020 PHARM REV CODE 255: Performed by: INTERNAL MEDICINE

## 2023-08-21 PROCEDURE — 99152 MOD SED SAME PHYS/QHP 5/>YRS: CPT | Performed by: INTERNAL MEDICINE

## 2023-08-21 PROCEDURE — C1769 GUIDE WIRE: HCPCS | Performed by: INTERNAL MEDICINE

## 2023-08-21 PROCEDURE — 25000003 PHARM REV CODE 250: Performed by: NURSE PRACTITIONER

## 2023-08-21 PROCEDURE — C1887 CATHETER, GUIDING: HCPCS | Performed by: INTERNAL MEDICINE

## 2023-08-21 PROCEDURE — 93458 L HRT ARTERY/VENTRICLE ANGIO: CPT | Performed by: INTERNAL MEDICINE

## 2023-08-21 RX ORDER — ACETAMINOPHEN 325 MG/1
650 TABLET ORAL EVERY 4 HOURS PRN
Status: DISCONTINUED | OUTPATIENT
Start: 2023-08-21 | End: 2023-08-21 | Stop reason: HOSPADM

## 2023-08-21 RX ORDER — LIDOCAINE HYDROCHLORIDE 10 MG/ML
INJECTION INFILTRATION; PERINEURAL
Status: DISCONTINUED | OUTPATIENT
Start: 2023-08-21 | End: 2023-08-21 | Stop reason: HOSPADM

## 2023-08-21 RX ORDER — HEPARIN SODIUM 5000 [USP'U]/ML
INJECTION, SOLUTION INTRAVENOUS; SUBCUTANEOUS
Status: DISCONTINUED | OUTPATIENT
Start: 2023-08-21 | End: 2023-08-21 | Stop reason: HOSPADM

## 2023-08-21 RX ORDER — DIAZEPAM 5 MG/1
5 TABLET ORAL ONCE
Status: COMPLETED | OUTPATIENT
Start: 2023-08-21 | End: 2023-08-21

## 2023-08-21 RX ORDER — SODIUM CHLORIDE 9 MG/ML
INJECTION, SOLUTION INTRAVENOUS ONCE
Status: DISCONTINUED | OUTPATIENT
Start: 2023-08-21 | End: 2023-08-21 | Stop reason: HOSPADM

## 2023-08-21 RX ORDER — FENTANYL CITRATE 50 UG/ML
INJECTION, SOLUTION INTRAMUSCULAR; INTRAVENOUS
Status: DISCONTINUED | OUTPATIENT
Start: 2023-08-21 | End: 2023-08-21 | Stop reason: HOSPADM

## 2023-08-21 RX ORDER — MIDAZOLAM HYDROCHLORIDE 1 MG/ML
INJECTION INTRAMUSCULAR; INTRAVENOUS
Status: DISCONTINUED | OUTPATIENT
Start: 2023-08-21 | End: 2023-08-21 | Stop reason: HOSPADM

## 2023-08-21 RX ORDER — DIPHENHYDRAMINE HCL 25 MG
25 CAPSULE ORAL
Status: DISCONTINUED | OUTPATIENT
Start: 2023-08-21 | End: 2023-08-21 | Stop reason: HOSPADM

## 2023-08-21 RX ORDER — ONDANSETRON 4 MG/1
8 TABLET, ORALLY DISINTEGRATING ORAL EVERY 8 HOURS PRN
Status: DISCONTINUED | OUTPATIENT
Start: 2023-08-21 | End: 2023-08-21 | Stop reason: HOSPADM

## 2023-08-21 RX ORDER — NITROGLYCERIN 20 MG/100ML
INJECTION INTRAVENOUS
Status: DISCONTINUED | OUTPATIENT
Start: 2023-08-21 | End: 2023-08-21 | Stop reason: HOSPADM

## 2023-08-21 RX ADMIN — DIPHENHYDRAMINE HYDROCHLORIDE 25 MG: 25 CAPSULE ORAL at 10:08

## 2023-08-21 RX ADMIN — DIAZEPAM 5 MG: 5 TABLET ORAL at 10:08

## 2023-08-21 RX ADMIN — BACITRACIN ZINC, NEOMYCIN, POLYMYXIN B 1 EACH: 400; 3.5; 5 OINTMENT TOPICAL at 12:08

## 2023-08-21 NOTE — BRIEF OP NOTE
Angiogram, Coronary, with Left Heart Cath Brief Op Note  Patient Name: Doug Faye  MRN: [id number]  : 1955  Date of Procedure: 2023  Location of Procedure: [facility] CATH LAB    Procedure: Angiogram, Coronary, with Left Heart Cath    Pre-op Dx:   Abnormal stress test     Post-op Dx:   Non-obstructive CAD     Staff: Surgeon(s):  Kishan De La Rosa MD     Access:   Right radial artery    Findings:   Non-obstructive CAD  LVEDP: 18mmHg           Complications:  No immediate complications            Disposition:  7E with plans to discharge           Condition:  stable     Impression:  Successful coronary angiogram    Plan:  Medical management       Kishan De La Rosa MD  Interventional Cardiology   2023 11:19 AM

## 2023-08-21 NOTE — Clinical Note
The radial band was applied to the right radial artery. 8 cc's of air were inserted into the closure device. Fingers warm to touch; cap refill less than 3 seconds; able to move without complaints or difficulty Yes

## 2023-08-21 NOTE — Clinical Note
47 ml of contrast were injected throughout the case. 10 mL of contrast was the total wasted during the case. 57 mL was the total amount used during the case.

## 2023-08-21 NOTE — PLAN OF CARE
IV discontinued tip intact, discharge instructions given with education materials provided, stressed the importance of keeping follow-up appointment, take meds as prescribed, and return to ER if symptoms warrant. Patient left the hospital in stable condition, no complications via transport in wheelchair to private vehicle with family at 1330.

## 2023-08-21 NOTE — INTERVAL H&P NOTE
Doug Faye was evaluated in Cardiology Clinic and scheduled for [facility].  The patient is stable to proceed with the procedure.  No significant change in the patient's status noted.      Kishan De La Rosa MD

## 2023-08-21 NOTE — DISCHARGE INSTRUCTIONS
Radial site care       Keep site clean and dry.     Do not submerge right arm in standing water for 5 days. (No bathtub, whirlpool, Jacuzzi or swimming or sink water)     No washing dishes for 5 days.      Do not flex or bend, push or pull with right arm for 24 hours.      Remove armboard after 24 hours.    Remove bandage after 48 hours.     Do no  anything over 5 pounds for 5 days with right hand.     No driving or operating heavy machinery for 24 hours.       If bleeding occurs, hold pressure, lay flat and call for medical care.      Report to the ER bleeding, swelling or significant pain in affected arm.      Report to the ER if you feel like you are having a heart attack or stroke.     Report to ER for complications such as pain unrelieved by OTC medications, bleeding, infection, or circulation problems.    Drink plenty of fluids over the next two days to flush contrast out of system.    Follow a low sodium, heart healthy diet.     Call clinic with any questions or concerns at [phone].     Keep all scheduled follow up appointments.      Thank you for allowing me to participate in your medical care.       - Cecilia

## 2023-08-21 NOTE — DISCHARGE SUMMARY
[facility] - Cath Lab  Discharge Note  Short Stay    Procedure(s) (LRB):  Angiogram, Coronary, with Left Heart Cath (Left)      Final Diagnosis:  successful outpatient coronary angiogram.   Outcome:  the patient tolerated the procedure well, and can be discharged.    Disposition:  discharge to home.   Follow-up:  in Cardiology Clinic in 1 month.  If the patient has any concerning symptoms, the patient should go to the Emergency Department.

## 2023-08-21 NOTE — PROGRESS NOTES
Patient reports he is good and does not need/want any pain interventions. He states this is his normal.

## 2023-08-21 NOTE — Clinical Note
The right groin and right radial was prepped. The site was prepped with ChloraPrep. The site was clipped. The patient was draped.

## 2023-08-22 VITALS
BODY MASS INDEX: 33.95 KG/M2 | HEART RATE: 72 BPM | RESPIRATION RATE: 18 BRPM | SYSTOLIC BLOOD PRESSURE: 134 MMHG | DIASTOLIC BLOOD PRESSURE: 75 MMHG | TEMPERATURE: 98 F | HEIGHT: 71 IN | OXYGEN SATURATION: 97 % | WEIGHT: 242.5 LBS

## 2023-10-03 ENCOUNTER — OFFICE VISIT (OUTPATIENT)
Dept: OPHTHALMOLOGY | Facility: CLINIC | Age: 68
End: 2023-10-03
Payer: MEDICARE

## 2023-10-03 VITALS — HEIGHT: 71 IN | BODY MASS INDEX: 33.95 KG/M2 | WEIGHT: 242.5 LBS

## 2023-10-03 DIAGNOSIS — H40.1123 PRIMARY OPEN ANGLE GLAUCOMA (POAG) OF LEFT EYE, SEVERE STAGE: ICD-10-CM

## 2023-10-03 DIAGNOSIS — H04.123 DRY EYE SYNDROME OF BOTH EYES: ICD-10-CM

## 2023-10-03 DIAGNOSIS — H47.20 OPTIC ATROPHY OF LEFT EYE: ICD-10-CM

## 2023-10-03 DIAGNOSIS — H40.1111 PRIMARY OPEN ANGLE GLAUCOMA (POAG) OF RIGHT EYE, MILD STAGE: Primary | ICD-10-CM

## 2023-10-03 PROCEDURE — 92133 CPTRZD OPH DX IMG PST SGM ON: CPT | Mod: PBBFAC,PN | Performed by: OPHTHALMOLOGY

## 2023-10-03 PROCEDURE — 99213 OFFICE O/P EST LOW 20 MIN: CPT | Mod: PBBFAC,PN | Performed by: STUDENT IN AN ORGANIZED HEALTH CARE EDUCATION/TRAINING PROGRAM

## 2023-10-03 RX ORDER — SILDENAFIL 100 MG/1
TABLET, FILM COATED ORAL
COMMUNITY
Start: 2023-09-12 | End: 2023-11-16

## 2023-10-03 RX ORDER — AZITHROMYCIN 250 MG/1
TABLET, FILM COATED ORAL
COMMUNITY
Start: 2023-09-12

## 2023-10-03 RX ORDER — BACLOFEN 20 MG/1
TABLET ORAL
COMMUNITY
Start: 2023-09-12

## 2023-10-03 RX ORDER — BRIMONIDINE TARTRATE 1.5 MG/ML
1 SOLUTION/ DROPS OPHTHALMIC 3 TIMES DAILY
Qty: 10 ML | Refills: 5 | Status: SHIPPED | OUTPATIENT
Start: 2023-10-03 | End: 2024-01-11

## 2023-10-03 RX ORDER — CIPROFLOXACIN HYDROCHLORIDE 3 MG/ML
SOLUTION/ DROPS OPHTHALMIC
COMMUNITY
Start: 2023-09-12

## 2023-10-03 RX ORDER — PREDNISONE 20 MG/1
TABLET ORAL
COMMUNITY
Start: 2023-09-12

## 2023-10-03 NOTE — PROGRESS NOTES
HPI     Primary Open Angle Glaucoma      Additional comments: IOP check            Eye Pain     Additional comments: Patient states that left eye feel scratchy all of   the time. He is using AFT OU TID+ and  erythromycin ointment but he ran   out yesterday. Patient feel like  erythromycin helps scratchy pain.            Medication Refill     Additional comments: Please refill all drops and  erythromycin ointment.   Pharmacy correct           Comments    Primary Open Angle Glaucoma           Last edited by Martin Valentin MA on 10/3/2023  7:28 AM.            HPI     Primary Open Angle Glaucoma      Additional comments: IOP check            Eye Pain     Additional comments: Patient states that left eye feel scratchy all of   the time. He is using AFT OU TID+ and  erythromycin ointment but he ran   out yesterday. Patient feel like  erythromycin helps scratchy pain.            Medication Refill     Additional comments: Please refill all drops and  erythromycin ointment.   Pharmacy correct           Comments    Primary Open Angle Glaucoma           Last edited by Martin Valentin MA on 10/3/2023  7:28 AM.        Testing     Tmax 28//38  //565    OCT RNFL 10/2023: all green // all red  OCT RNFL 12/22/20  OD - 80, yellow S, remainder green  OS - poor quality, red S  OCT RNFL: 3/2017: 77//55 ( S, I red, T borderline, N green)  OCT RNFL: 1/2019 OD: 81 yellow S, otherwise green // unable OS    Gonio: 9/29/2020  OD: Open to   OS: Open to SS/  Gonio 12/2014: Open OU    HVF 24-2: 9/29/2020  OD: 1/10 losses, reliable, early superior arcuate defect  OS: 0/11 losses, total defect  HVF (2/18/15) -- Full OD/IAD OS  HVF 24-2 (3/2017): Full OD/ IAD +progression OS  HVF 24-2 5/13/19 OD only: full and reliable  HVF 12/23/21:  OD: few scattered superior defects, grossly full   OS: generalized depression    B-scan OS 7/24/20 - flat and attached, no tears/detachments appreciated Assessment/Plan POAG (primary  open-angle glaucoma) H40.1190   Ordered:   Clinic Follow up, *Est. 04/21/22 12:30:00 CDT, Order for future visit, POAG (primary open-angle glaucoma), [facility]         Assessment/Plan      1. Severe POAG OS  2. s/p CEIOL + AGV OS 10/29/2020  - s/p trab with multiple revisions, Due to persistent bleb leak with resultant hypotony with maculopathy s/p trab (11/18/17), trab revision x4 (2/21/18, 2/26/18, 4/3/18, 5/29/18)  - Tmax 38  - Timolol was discontinued as patient reports it gave him arrhythmias, so will attempt trial off Timolol  - Continue Alphagan and Brinzolamide TID, Latanoprost QHS  - 10/23: IOP OK at 18, not using massage, pressure is adequate given poor vision potential    3. Open angle high risk of glaucoma, right  - IOP stable; in fact likely not glaucomatous due to normal CD and IOP, although high risk  - Tmax 28   - Continue Alphagan and Brinzolamide TID, Latanoprost QHS  - Good IOP today - can consider SLT in the future if becomes uncontrolled  - OCTn 07/23 stable, all green OS, stable thinning OS  - low concern for glaucoma at this time but will treat as precautionary measure    4. Ptosis OS  - Pt states that he only has noticed it since the surgery  - MRD1 0.5 OS  - doesn't bother pt    5. PCIOL OD  - 7/26/23 yag cap, BCVA 20/25    6. Dry Eye Syndrome vs Glaucoma Medicamentosa OS>OD  - PFATs 6 times a day, WC/LS. Start erythromycin jean OD (ok to also use OU) for dense confluent PEEs   - Excess glaucoma drops in right eye may be contributing factor  - Discussed punctal plugs vs restasis, pt interested in restasis if possible. Told to increase PFATs to at least 6 times daily and continue jean.   - could trial SLT OD to lower IOP and reduce drop burden  - could switch to preservative free glaucoma drops    7. Optic atrophy OS  - May be secondary to the hypotony in 2017/2018    8. PVD OD  - RD precautions given    9. Monocular precautions  - polycarbs recc, patient doesn't want to wear  glasses    RTC 3 mo IOP, HVF24-2, cct

## 2023-11-14 NOTE — PROGRESS NOTES
CHIEF COMPLAINT:   Chief Complaint   Patient presents with    Post op [facility]     Patient c/o having 1 episode of chest pain relieved with taking 1 NTG                Review of patient's allergies indicates:   Allergen Reactions    Penicillins      Other reaction(s): Passed out                                          HPI:  Doug Faye 67 y.o. male with a past medical history of nonobstructive CAD, HLD, HTN, obesity, ANGELITA/BiPAP, and GERD presents for routine follow up post left heart catheterization.  Due to previous complaints of intermittent midsternal chest pressure, the patient completed a nuclear stress test on 7.24.23 that was abnormal revealing a moderate-sized, moderate intensity reversible perfusion abnormality consistent with ischemia in RCA territory.  He underwent a subsequent left heart catheterization on 8.21.23 that revealed nonobstructive CAD.  The patient underwent a previous left heart catheterization on 3.15.21 that revealed nonobstructive CAD with ectatic RCA.  Latest Echocardiogram obtained on 4.14.21 revealed preserved EF of 50-55%. (See reports below).     Patient presents to clinic today endorsing only one episode of chest pain over the past 3 months.  He describes it as a midsternal chest pressure that was relieved with sublingual nitroglycerin. He continues to endorse ongoing exertional dyspnea but states he is able to perform his normal ADLs without issue. However, he notes some shortness of breath with moderate activity.  Notably, the patient has some baseline activity limitation secondary to chronic back pain.  He denies any palpitations, orthopnea, PND,  peripheral edema or syncope.  He endorses intermittent numbness to his bilateral lower extremities but believes that it may be related to his ongoing back pain.  He endorses nightly compliance with BiPAP and feels he is benefitting from use      CARDIAC TESTING   Cardiac Catheterization 8.21.23  Summary    The Prox RCA to Mid RCA lesion was  50% stenosed.    The RPDA lesion was 50% stenosed.    The pre-procedure left ventricular end diastolic pressure was 18.    The estimated blood loss was <50 mL.    There was non-obstructive coronary artery disease..     LVEDP:   18 mmHg     RECOMMENDATIONS  Medical management  Risk factor modifications     Nuclear Stress Test    Abnormal myocardial perfusion scan.    There is a moderate intensity, moderate sized, reversible perfusion abnormality that is consistent with ischemia in the basal to mid inferolateral wall(s) in the typical distribution of the RCA territory.    There are no other significant perfusion abnormalities.    The gated perfusion images showed an ejection fraction of 55% at rest. The gated perfusion images showed an ejection fraction of 63% post stress.    The patient reported no chest pain during the stress test.    There were no arrhythmias during stress.     On the nuclear stress test the EF is normal.  If the patient has an echo, the EF on the echo is considered more accurate.         The patient has a moderate risk nuclear stress test.      If clinically indicated, consider further evaluation with coronary angiogram.    PFT testing July 14, 2021:  Normal PFT    TTE 4.14.21  Left ventricular ejection fraction is measured approximately 50 to 55%  Structurally normal mitral valve  Structurally aortic valve is trileaflet  Tricuspid valve is structurally normal  There is mild tricuspid regurgitation with RVSP estimated at 34 mmHg  Pulmonic valve is grossly normal  Pulmonic regurgitation present  Mildly dilated left atrium  Left ventricular ejection fraction is measured approximately 50 to 55%  Mildly dilated right atrium  Normal right ventricular size with preserved RV function    Lexiscan stress test 2/25/2021:  Inferior and lateral ischemia  No scar    Echocardiogram January 24, 2017:  Normal left ventricular cavity with overall normal systolic function, EF measured at approximately  59%    Echocardiogram April 25, 2016:  Normal left ventricular cavity with overall normal systolic function-assessed at 63%. Mild MR. Normal aortic valve. Mild TR. Trace pulmonic regurgitation is present. Mildly dilated left atrium. E/A flow reversal noted. Suggestive of diastolic dysfunction. Mildly enlarged right atrium size. Normal right ventricular size with preserved RV function. No evidence of significant pericardial effusion is noted.    Lexiscan 2/23/16  Impression:  Normal myocardial perfusion study. Negative for ischemia. Mildly  reduced LVEF of 46%       Patient Active Problem List   Diagnosis    Coronary artery disease involving native coronary artery of native heart without angina pectoris    Hyperlipidemia LDL goal <70    Primary hypertension    Other chest pain    QUIROZ (dyspnea on exertion)    ANGELITA treated with BiPAP    PCO (posterior capsular opacification), right    Primary open angle glaucoma (POAG) of right eye, mild stage     Past Surgical History:   Procedure Laterality Date    ANGIOGRAM, CORONARY, WITH LEFT HEART CATHETERIZATION Left 8/21/2023    Procedure: Angiogram, Coronary, with Left Heart Cath;  Surgeon: Kishan De La Rosa MD;  Location: [facility] CATH LAB;  Service: Cardiology;  Laterality: Left;    APPENDECTOMY      blebectomy      COLONOSCOPY  07/25/2014    Dr Yariel Razo    HERNIA REPAIR       Social History     Socioeconomic History    Marital status:    Tobacco Use    Smoking status: Never    Smokeless tobacco: Never   Substance and Sexual Activity    Alcohol use: Not Currently    Drug use: Never        Family History   Problem Relation Age of Onset    Hypertension Mother     Heart failure Father     Hypertension Father     Heart failure Sister     Hypertension Sister     Heart attack Sister     Stroke Brother      Current Outpatient Medications:     aspirin (ECOTRIN) 81 MG EC tablet, Take 1 tablet (81 mg total) by mouth once daily., Disp: 90 tablet, Rfl: 3    atorvastatin  (LIPITOR) 20 MG tablet, Take 1 tablet (20 mg total) by mouth once daily., Disp: 90 tablet, Rfl: 3    baclofen (LIORESAL) 20 MG tablet, Take 1 tablet every day by oral route at bedtime., Disp: , Rfl:     brimonidine 0.15 % OPTH DROP (ALPHAGAN) 0.15 % ophthalmic solution, Apply 1 drop to eye., Disp: , Rfl:     brimonidine 0.15 % OPTH DROP (ALPHAGAN) 0.15 % ophthalmic solution, Place 1 drop into both eyes 3 (three) times daily., Disp: 10 mL, Rfl: 5    brinzolamide (AZOPT) 1 % ophthalmic suspension, Place 1 drop into both eyes 3 (three) times daily., Disp: 10 mL, Rfl: 5    carvediloL (COREG) 6.25 MG tablet, Take 1 tablet (6.25 mg total) by mouth 2 (two) times daily., Disp: 180 tablet, Rfl: 3    ciprofloxacin HCl (CILOXAN) 0.3 % ophthalmic solution, INSTILL 2 DROPS EVERY 6 HOURS TO (R) EAR., Disp: , Rfl:     diphenoxylate-atropine 2.5-0.025 mg (LOMOTIL) 2.5-0.025 mg per tablet, , Disp: , Rfl:     docusate sodium (COLACE) 100 MG capsule, 1 capsule as needed., Disp: , Rfl:     DULoxetine (CYMBALTA) 60 MG capsule, Take 60 mg by mouth once daily., Disp: , Rfl:     latanoprost 0.005 % ophthalmic solution, Place 1 drop into both eyes once daily., Disp: 2.5 mL, Rfl: 5    lisinopriL-hydrochlorothiazide (PRINZIDE,ZESTORETIC) 20-12.5 mg per tablet, Take 1 tablet by mouth once daily., Disp: 90 tablet, Rfl: 3    nitroGLYCERIN (NITROSTAT) 0.4 MG SL tablet, Place 1 tablet (0.4 mg total) under the tongue every 5 (five) minutes as needed for Chest pain., Disp: 25 tablet, Rfl: 6    tamsulosin (FLOMAX) 0.4 mg Cap, Take 1 capsule by mouth once daily., Disp: , Rfl:     traZODone (DESYREL) 50 MG tablet, Take 50 mg by mouth nightly., Disp: , Rfl:     white petrolatum-mineral oiL 94-3 % Oint, Place into both eyes every evening., Disp: 7 g, Rfl: 3    azithromycin (Z-NOEL) 250 MG tablet, TAKE 2 TABLETS (500 MG) BY ORAL ROUTE ONCE DAILY FOR 1 DAY THEN 1 TABLET (250 MG) BY ORAL ROUTE ONCE DAILY FOR 4 DAYS, Disp: , Rfl:     gabapentin (NEURONTIN)  "600 MG tablet, Take 600 mg by mouth 3 (three) times daily., Disp: , Rfl:     HYDROcodone-acetaminophen (NORCO)  mg per tablet, 1 tablet as needed., Disp: , Rfl:     isosorbide mononitrate (IMDUR) 60 MG 24 hr tablet, Take 1 tablet (60 mg total) by mouth once daily. (Patient not taking: Reported on 11/16/2023), Disp: 30 tablet, Rfl: 11    loperamide (IMODIUM) 2 mg capsule, , Disp: , Rfl:     methocarbamoL (ROBAXIN) 750 MG Tab, Take 750 mg by mouth 3 (three) times daily., Disp: , Rfl:     metoclopramide HCl (REGLAN) 10 MG tablet, , Disp: , Rfl:     pantoprazole (PROTONIX) 40 MG tablet, Take 40 mg by mouth once daily., Disp: , Rfl:     predniSONE (DELTASONE) 20 MG tablet, Take 1 tablet every day by oral route for 3 days., Disp: , Rfl:     sildenafiL (VIAGRA) 100 MG tablet, TAKE ONE TABLET BY MOUTH ONE HALF HOUR PRIOR TO SEX, Disp: , Rfl:     Current Facility-Administered Medications:     diazePAM tablet 5 mg, 5 mg, Oral, On Call Procedure, Jose, Laterica L, FNP     ROS:                                                                                                                                                                             Review of Systems   Constitutional: Negative.    Eyes:         Visual impairment   Respiratory:  Positive for shortness of breath.    Cardiovascular:  Positive for chest pain and palpitations.   Gastrointestinal: Negative.    Musculoskeletal: Negative.    Skin: Negative.    Neurological: Negative.    Psychiatric/Behavioral: Negative.          Blood pressure 121/68, pulse 65, temperature 98 °F (36.7 °C), temperature source Oral, resp. rate 20, height 5' 11" (1.803 m), weight 108.8 kg (239 lb 13.8 oz), SpO2 98 %.     PE:  Physical Exam  Constitutional:       Appearance: Normal appearance.   HENT:      Head: Normocephalic.   Eyes:      Extraocular Movements: Extraocular movements intact.   Cardiovascular:      Rate and Rhythm: Normal rate and regular rhythm.   Pulmonary:      " Effort: Pulmonary effort is normal.   Abdominal:      Palpations: Abdomen is soft.   Musculoskeletal:         General: Normal range of motion.      Cervical back: Neck supple.   Skin:     General: Skin is warm and dry.   Neurological:      General: No focal deficit present.      Mental Status: He is alert and oriented to person, place, and time.   Psychiatric:         Mood and Affect: Mood normal.       ASSESSMENT/PLAN:  Nonobstructive CAD  - S/p LH- nonobstructive CAD (8.21.23)  - C - 3.15.21 that revealed nonobstructive CAD with ectatic RCA (3.15.21)  - Abnormal Stress Test- moderate intensity, moderate sized, reversible perfusion abnormality that is consistent with ischemia in the basal to mid inferolateral wall(s) in the typical distribution of the RCA territory.(7.24.23)   - LVEF - 50-55% per TTE 4.14.21  - Reports one episode of chest pain over the past three months. Relieved with nitro   - Continue Aspirin, Carvedilol, Lisinopril/HCTZ, Atorvastatin,and SL Nitro prn CP. Unable to Imdur due to dizziness  - Instructed patient to notify clinic of any anginal/anginal equivalent symptoms. Consider adding Ranexa if symptoms persist.  - Counseled on the importance of consuming a heart healthy diet and exercise as tolerated   - Strict ED precautions     QUIROZ -ongoing  - LVEF - 50-55% per TTE 4.14.21  - PFTs July 14, 2021 - normal  - Obtain up-to-date Echo to assess LV function check for any significant valvular abnormalities    HTN  - BP at goal   - Continue Carvedilol 6.25 mg BID, Lisinopril/HCTZ 20/12.5 mg,   - Counseled on low salt diet     GERD  - Management per PCP    ANGELITA/BiPAP  - Patient reports nightly compliance with his BiPAP and feels he is benefitting from use   - Recommend continued nightly BiPAP use    Hypercholesterolemia  - LDL -25-at goal   - Continue Atorvastatin 20 MG and fish oil   - Counseled on the importance of obtaining a healthy BMI, and consuming a heart healthy, low-cholesterol  diet    Numbness in legs  - Endorses intermittent numbness to BLE  - BINH of BLE to assess for any arterial stenosis.     Chronic Back Pain  - Continue with pain management as directed    Obesity  - Encouraged weight loss through diet and increased activity as tolerated     ECHO  BINH of BLE   Follow up in Cardiology Clinic in 4 months or sooner if needed    Follow up with PCP as directed  Strict ED precautions

## 2023-11-16 ENCOUNTER — OFFICE VISIT (OUTPATIENT)
Dept: CARDIOLOGY | Facility: CLINIC | Age: 68
End: 2023-11-16
Payer: MEDICARE

## 2023-11-16 VITALS
WEIGHT: 239.88 LBS | RESPIRATION RATE: 20 BRPM | TEMPERATURE: 98 F | DIASTOLIC BLOOD PRESSURE: 68 MMHG | HEIGHT: 71 IN | OXYGEN SATURATION: 98 % | HEART RATE: 65 BPM | SYSTOLIC BLOOD PRESSURE: 121 MMHG | BODY MASS INDEX: 33.58 KG/M2

## 2023-11-16 DIAGNOSIS — I25.118 CORONARY ARTERY DISEASE OF NATIVE ARTERY OF NATIVE HEART WITH STABLE ANGINA PECTORIS: ICD-10-CM

## 2023-11-16 DIAGNOSIS — I73.9 CLAUDICATION: ICD-10-CM

## 2023-11-16 DIAGNOSIS — E78.5 HYPERLIPIDEMIA LDL GOAL <70: ICD-10-CM

## 2023-11-16 DIAGNOSIS — R06.09 DOE (DYSPNEA ON EXERTION): ICD-10-CM

## 2023-11-16 DIAGNOSIS — I25.10 CORONARY ARTERY DISEASE INVOLVING NATIVE CORONARY ARTERY OF NATIVE HEART WITHOUT ANGINA PECTORIS: Primary | ICD-10-CM

## 2023-11-16 DIAGNOSIS — I10 PRIMARY HYPERTENSION: ICD-10-CM

## 2023-11-16 PROCEDURE — 1160F RVW MEDS BY RX/DR IN RCRD: CPT | Mod: CPTII,,, | Performed by: NURSE PRACTITIONER

## 2023-11-16 PROCEDURE — 99215 OFFICE O/P EST HI 40 MIN: CPT | Mod: PBBFAC | Performed by: NURSE PRACTITIONER

## 2023-11-16 PROCEDURE — 4010F ACE/ARB THERAPY RXD/TAKEN: CPT | Mod: CPTII,,, | Performed by: NURSE PRACTITIONER

## 2023-11-16 PROCEDURE — 4010F PR ACE/ARB THEARPY RXD/TAKEN: ICD-10-PCS | Mod: CPTII,,, | Performed by: NURSE PRACTITIONER

## 2023-11-16 PROCEDURE — 1159F PR MEDICATION LIST DOCUMENTED IN MEDICAL RECORD: ICD-10-PCS | Mod: CPTII,,, | Performed by: NURSE PRACTITIONER

## 2023-11-16 PROCEDURE — 3074F SYST BP LT 130 MM HG: CPT | Mod: CPTII,,, | Performed by: NURSE PRACTITIONER

## 2023-11-16 PROCEDURE — 3078F PR MOST RECENT DIASTOLIC BLOOD PRESSURE < 80 MM HG: ICD-10-PCS | Mod: CPTII,,, | Performed by: NURSE PRACTITIONER

## 2023-11-16 PROCEDURE — 3288F PR FALLS RISK ASSESSMENT DOCUMENTED: ICD-10-PCS | Mod: CPTII,,, | Performed by: NURSE PRACTITIONER

## 2023-11-16 PROCEDURE — 1159F MED LIST DOCD IN RCRD: CPT | Mod: CPTII,,, | Performed by: NURSE PRACTITIONER

## 2023-11-16 PROCEDURE — 3078F DIAST BP <80 MM HG: CPT | Mod: CPTII,,, | Performed by: NURSE PRACTITIONER

## 2023-11-16 PROCEDURE — 1160F PR REVIEW ALL MEDS BY PRESCRIBER/CLIN PHARMACIST DOCUMENTED: ICD-10-PCS | Mod: CPTII,,, | Performed by: NURSE PRACTITIONER

## 2023-11-16 PROCEDURE — 3008F PR BODY MASS INDEX (BMI) DOCUMENTED: ICD-10-PCS | Mod: CPTII,,, | Performed by: NURSE PRACTITIONER

## 2023-11-16 PROCEDURE — 1101F PR PT FALLS ASSESS DOC 0-1 FALLS W/OUT INJ PAST YR: ICD-10-PCS | Mod: CPTII,,, | Performed by: NURSE PRACTITIONER

## 2023-11-16 PROCEDURE — 3008F BODY MASS INDEX DOCD: CPT | Mod: CPTII,,, | Performed by: NURSE PRACTITIONER

## 2023-11-16 PROCEDURE — 99214 PR OFFICE/OUTPT VISIT, EST, LEVL IV, 30-39 MIN: ICD-10-PCS | Mod: S$PBB,,, | Performed by: NURSE PRACTITIONER

## 2023-11-16 PROCEDURE — 3074F PR MOST RECENT SYSTOLIC BLOOD PRESSURE < 130 MM HG: ICD-10-PCS | Mod: CPTII,,, | Performed by: NURSE PRACTITIONER

## 2023-11-16 PROCEDURE — 3288F FALL RISK ASSESSMENT DOCD: CPT | Mod: CPTII,,, | Performed by: NURSE PRACTITIONER

## 2023-11-16 PROCEDURE — 1101F PT FALLS ASSESS-DOCD LE1/YR: CPT | Mod: CPTII,,, | Performed by: NURSE PRACTITIONER

## 2023-11-16 PROCEDURE — 99214 OFFICE O/P EST MOD 30 MIN: CPT | Mod: S$PBB,,, | Performed by: NURSE PRACTITIONER

## 2023-11-16 RX ORDER — LISINOPRIL AND HYDROCHLOROTHIAZIDE 12.5; 2 MG/1; MG/1
1 TABLET ORAL DAILY
Qty: 90 TABLET | Refills: 3 | Status: SHIPPED | OUTPATIENT
Start: 2023-11-16 | End: 2024-11-15

## 2023-11-16 RX ORDER — NITROGLYCERIN 0.4 MG/1
0.4 TABLET SUBLINGUAL EVERY 5 MIN PRN
Qty: 25 TABLET | Refills: 6 | Status: SHIPPED | OUTPATIENT
Start: 2023-11-16 | End: 2024-11-15

## 2023-11-16 NOTE — PATIENT INSTRUCTIONS
ECHO  BINH of BLE   Follow up in Cardiology Clinic in 4 months or sooner if needed    Follow up with PCP as directed  Strict ED precautions

## 2023-12-28 ENCOUNTER — HOSPITAL ENCOUNTER (OUTPATIENT)
Dept: CARDIOLOGY | Facility: HOSPITAL | Age: 68
Discharge: HOME | End: 2023-12-28
Attending: NURSE PRACTITIONER
Payer: MEDICARE

## 2023-12-28 ENCOUNTER — HOSPITAL ENCOUNTER (OUTPATIENT)
Dept: RADIOLOGY | Facility: HOSPITAL | Age: 68
Discharge: HOME | End: 2023-12-28
Attending: NURSE PRACTITIONER
Payer: MEDICARE

## 2023-12-28 VITALS
SYSTOLIC BLOOD PRESSURE: 157 MMHG | WEIGHT: 242 LBS | DIASTOLIC BLOOD PRESSURE: 93 MMHG | HEIGHT: 71 IN | BODY MASS INDEX: 33.88 KG/M2

## 2023-12-28 DIAGNOSIS — I73.9 CLAUDICATION: ICD-10-CM

## 2023-12-28 DIAGNOSIS — R06.09 DOE (DYSPNEA ON EXERTION): ICD-10-CM

## 2023-12-28 LAB
AV INDEX (PROSTH): 0.79
AV MEAN GRADIENT: 3 MMHG
AV PEAK GRADIENT: 5 MMHG
AV VALVE AREA BY VELOCITY RATIO: 2.66 CM²
AV VALVE AREA: 2.45 CM²
AV VELOCITY RATIO: 0.86
BSA FOR ECHO PROCEDURE: 2.34 M2
CV ECHO LV RWT: 0.36 CM
DOP CALC AO PEAK VEL: 1.12 M/S
DOP CALC AO VTI: 24.6 CM
DOP CALC LVOT AREA: 3.1 CM2
DOP CALC LVOT DIAMETER: 1.99 CM
DOP CALC LVOT PEAK VEL: 0.96 M/S
DOP CALC LVOT STROKE VOLUME: 60.31 CM3
DOP CALC MV VTI: 10.8 CM
DOP CALCLVOT PEAK VEL VTI: 19.4 CM
E WAVE DECELERATION TIME: 207.72 MSEC
E/A RATIO: 1.13
ECHO LV POSTERIOR WALL: 0.94 CM (ref 0.6–1.1)
FRACTIONAL SHORTENING: 31 % (ref 28–44)
HR MV ECHO: 74 BPM
INTERVENTRICULAR SEPTUM: 0.87 CM (ref 0.6–1.1)
LEFT ATRIUM SIZE: 4.85 CM
LEFT ATRIUM VOLUME INDEX MOD: 15.5 ML/M2
LEFT ATRIUM VOLUME MOD: 35.51 CM3
LEFT INTERNAL DIMENSION IN SYSTOLE: 3.58 CM (ref 2.1–4)
LEFT VENTRICLE DIASTOLIC VOLUME INDEX: 56.72 ML/M2
LEFT VENTRICLE DIASTOLIC VOLUME: 129.9 ML
LEFT VENTRICLE MASS INDEX: 75 G/M2
LEFT VENTRICLE SYSTOLIC VOLUME INDEX: 23.5 ML/M2
LEFT VENTRICLE SYSTOLIC VOLUME: 53.8 ML
LEFT VENTRICULAR INTERNAL DIMENSION IN DIASTOLE: 5.21 CM (ref 3.5–6)
LEFT VENTRICULAR MASS: 170.77 G
LV LATERAL E/E' RATIO: 3.71 M/S
LVOT MG: 1.83 MMHG
LVOT MV: 0.62 CM/S
MV MEAN GRADIENT: 1 MMHG
MV PEAK A VEL: 0.46 M/S
MV PEAK E VEL: 0.52 M/S
MV PEAK GRADIENT: 1 MMHG
MV VALVE AREA BY CONTINUITY EQUATION: 5.58 CM2
OHS LV EJECTION FRACTION SIMPSONS BIPLANE MOD: 57 %
PISA TR MAX VEL: 2.74 M/S
RA MAJOR: 5.19 CM
TDI LATERAL: 0.14 M/S
TR MAX PG: 30 MMHG
TRICUSPID ANNULAR PLANE SYSTOLIC EXCURSION: 2.05 CM
Z-SCORE OF LEFT VENTRICULAR DIMENSION IN END DIASTOLE: -5.18
Z-SCORE OF LEFT VENTRICULAR DIMENSION IN END SYSTOLE: -3.08

## 2023-12-28 PROCEDURE — 93924 LWR XTR VASC STDY BILAT: CPT

## 2023-12-28 PROCEDURE — 93306 TTE W/DOPPLER COMPLETE: CPT

## 2023-12-29 ENCOUNTER — TELEPHONE (OUTPATIENT)
Dept: CARDIOLOGY | Facility: CLINIC | Age: 68
End: 2023-12-29
Payer: MEDICARE

## 2023-12-29 LAB
ABI POST MINUTES1: 5 MIN
ABI POST MINUTES2: 10 MIN
IMMEDIATE ARM BP: 156 MMHG
IMMEDIATE LEFT ABI: 1.01
IMMEDIATE LEFT TIBIAL: 157 MMHG
IMMEDIATE RIGHT ABI: 0.94
IMMEDIATE RIGHT TIBIAL: 147 MMHG
LEFT ABI: 1.21
LEFT ARM BP: 138 MMHG
LEFT DORSALIS PEDIS: 158 MMHG
LEFT POSTERIOR TIBIAL: 167 MMHG
POST1 ARM BP: 146 MMHG
POST1 LEFT ABI: 1.08
POST1 LEFT TIBIAL: 158 MMHG
POST1 RIGHT ABI: 1.02
POST1 RIGHT TIBIAL: 149 MMHG
POST2 ARM BP: 155 MMHG
POST2 LEFT ABI: 1.08
POST2 LEFT TIBIAL: 167 MMHG
POST2 RIGHT ABI: 1.07
POST2 RIGHT TIBIAL: 166 MMHG
RIGHT ABI: 1.24
RIGHT ARM BP: 136 MMHG
RIGHT DORSALIS PEDIS: 167 MMHG
RIGHT POSTERIOR TIBIAL: 171 MMHG
TOE RAISES: 40

## 2023-12-29 NOTE — TELEPHONE ENCOUNTER
Discussed results of testing with the patient.  Patient will continue with conservative measures for now considering mildly abnormal ABIs.  Advised on exercise regimen, and increased walking.  Patient verbalized understanding.  Instructed to notify clinic if claudication symptoms worsen or persist, will consider vascular surgery clinic referral at that time.

## 2024-01-11 ENCOUNTER — OFFICE VISIT (OUTPATIENT)
Dept: OPHTHALMOLOGY | Facility: CLINIC | Age: 69
End: 2024-01-11
Payer: MEDICARE

## 2024-01-11 VITALS — BODY MASS INDEX: 33.88 KG/M2 | HEIGHT: 71 IN | WEIGHT: 242 LBS

## 2024-01-11 DIAGNOSIS — H40.1123 PRIMARY OPEN ANGLE GLAUCOMA (POAG) OF LEFT EYE, SEVERE STAGE: ICD-10-CM

## 2024-01-11 DIAGNOSIS — H40.9 GLAUCOMA, UNSPECIFIED GLAUCOMA TYPE, UNSPECIFIED LATERALITY: ICD-10-CM

## 2024-01-11 PROCEDURE — 92083 EXTENDED VISUAL FIELD XM: CPT | Mod: PBBFAC,PN

## 2024-01-11 PROCEDURE — 99213 OFFICE O/P EST LOW 20 MIN: CPT | Mod: PBBFAC,PN

## 2024-01-11 PROCEDURE — 92083 EXTENDED VISUAL FIELD XM: CPT | Mod: PBBFAC,PN | Performed by: OPHTHALMOLOGY

## 2024-01-11 RX ORDER — LATANOPROST 50 UG/ML
1 SOLUTION/ DROPS OPHTHALMIC DAILY
Qty: 2.5 ML | Refills: 5 | Status: SHIPPED | OUTPATIENT
Start: 2024-01-11

## 2024-01-11 RX ORDER — BRIMONIDINE TARTRATE 1.5 MG/ML
1 SOLUTION/ DROPS OPHTHALMIC 3 TIMES DAILY
Qty: 10 ML | Refills: 5 | Status: SHIPPED | OUTPATIENT
Start: 2024-01-11

## 2024-01-11 RX ORDER — BRINZOLAMIDE 10 MG/ML
1 SUSPENSION/ DROPS OPHTHALMIC 3 TIMES DAILY
Qty: 10 ML | Refills: 5 | Status: SHIPPED | OUTPATIENT
Start: 2024-01-11

## 2024-01-11 NOTE — PROGRESS NOTES
[facility]    RTC 3 mo IOP, HVF24-2, cct  Last edited by Fozia Mohamud MA on 1/11/2024  7:40 AM.        Testing  Tmax 28//38  //565    OCT RNFL  3/2017: 77//55 ( S, I red, T borderline, N green)  1/2019 OD: 81 yellow S, otherwise green // unable OS  12/22/20  OD - 80, yellow S, remainder green  OS - poor quality, red S  10/2023 all green // all red    Gonio  9/29/2020  OD: Open to   OS: Open to SS/  12/2014 Open OU    HVF 24-2  2/18/15 -- Full OD/IAD OS  3/2017 Full OD/ IAD +progression OS  5/13/19 OD only: full and reliable  9/29/2020  OD: 1/10 losses, reliable, early superior arcuate defect  OS: 0/11 losses, total defect  12/23/21  OD: few scattered superior defects, grossly full   OS: generalized depression  1/11/24  OD: FL 0/11, FP 0%, FN 0%, nonspecific scattered defects, essentially full  OS: unable     B-scan OS 7/24/20 - flat and attached, no tears/detachments appreciated      Assessment/Plan  1. Severe POAG OS  2. s/p CEIOL + AGV OS 10/29/2020  - s/p trab with multiple revisions, Due to persistent bleb leak with resultant hypotony with maculopathy s/p trab (11/18/17), trab revision x4 (2/21/18, 2/26/18, 4/3/18, 5/29/18)  - Tmax 38  - //565  - Timolol was discontinued as patient reports it gave him arrhythmias, so will attempt trial off Timolol  - Continue Brimonidine and Brinzolamide TID, Latanoprost QHS  - 10/23: IOP OK at 18, not using massage, pressure is adequate given poor vision potential  - 1/11/24: IOP stable at 18//14, using ocular massage sometimes; HVF full OD // unable OS; continue current regimen    3. Open angle with borderline findings, high risk OD  - IOP stable; in fact likely not glaucomatous due to normal CD and IOP, although high risk  - Tmax 28   - Continue Alphagan and Brinzolamide TID, Latanoprost QHS  - Good IOP today - can consider SLT in the future if becomes uncontrolled  - OCTn 07/23 stable, all green OD, stable thinning OS  - low concern for glaucoma at this  time but will treat as precautionary measure    4. Ptosis OS  - Pt states that he only has noticed it since the surgery  - MRD1 0.5 OS  - doesn't bother pt    5. S/p CEIOL OD  - 7/26/23 yag cap, BCVA 20/25    6. Dry Eye Syndrome vs Glaucoma Medicamentosa OS>OD  - PFATs 6 times a day, WC/LS. Start erythromycin jean OD (ok to also use OU) for dense confluent PEEs   - Excess glaucoma drops in right eye may be contributing factor  - Discussed punctal plugs vs restasis, pt interested in restasis if possible (has never tried). Told to increase PFATs to at least 6 times daily and continue jean.   - could trial SLT OD to lower IOP and reduce drop burden  - could switch to preservative free glaucoma drops  - 1/11/24: patient using artificial tears q2-3hr and ointment at night; irritation improved significantly with ointment.     7. Optic atrophy OS  - May be secondary to the hypotony in 2017/2018    8. PVD OD  - RD precautions given    9. Monocular precautions  - polycarbs recc, patient doesn't want to wear glasses    RTC 3 mo IOP, OCT nerve, DFE

## 2024-04-11 ENCOUNTER — OFFICE VISIT (OUTPATIENT)
Dept: OPHTHALMOLOGY | Facility: CLINIC | Age: 69
End: 2024-04-11
Payer: MEDICARE

## 2024-04-11 VITALS — WEIGHT: 242.06 LBS | HEIGHT: 71 IN | BODY MASS INDEX: 33.89 KG/M2

## 2024-04-11 DIAGNOSIS — H04.123 DRY EYE SYNDROME OF BOTH EYES: ICD-10-CM

## 2024-04-11 DIAGNOSIS — H47.20 OPTIC ATROPHY OF LEFT EYE: ICD-10-CM

## 2024-04-11 DIAGNOSIS — H40.1123 PRIMARY OPEN ANGLE GLAUCOMA (POAG) OF LEFT EYE, SEVERE STAGE: Primary | ICD-10-CM

## 2024-04-11 PROCEDURE — 99214 OFFICE O/P EST MOD 30 MIN: CPT | Mod: PBBFAC,PN

## 2024-04-11 PROCEDURE — 92133 CPTRZD OPH DX IMG PST SGM ON: CPT | Mod: PBBFAC,PN | Performed by: OPHTHALMOLOGY

## 2024-04-11 RX ORDER — TESTOSTERONE 16.2 MG/G
GEL TRANSDERMAL
COMMUNITY
Start: 2024-03-12

## 2024-04-11 RX ORDER — ERYTHROMYCIN 5 MG/G
OINTMENT OPHTHALMIC
COMMUNITY

## 2024-04-11 RX ORDER — ALBUTEROL SULFATE 0.83 MG/ML
SOLUTION RESPIRATORY (INHALATION)
COMMUNITY
Start: 2024-01-12

## 2024-04-11 RX ORDER — METHYLPREDNISOLONE 4 MG/1
TABLET ORAL
COMMUNITY
Start: 2023-12-24

## 2024-04-11 RX ORDER — CIPROFLOXACIN AND DEXAMETHASONE 3; 1 MG/ML; MG/ML
SUSPENSION/ DROPS AURICULAR (OTIC)
COMMUNITY

## 2024-04-11 RX ORDER — CETIRIZINE HYDROCHLORIDE 10 MG/1
1 TABLET ORAL DAILY
COMMUNITY
Start: 2023-10-20

## 2024-04-11 RX ORDER — AMOXICILLIN AND CLAVULANATE POTASSIUM 875; 125 MG/1; MG/1
TABLET, FILM COATED ORAL
COMMUNITY
Start: 2024-03-12

## 2024-04-11 RX ORDER — NAPROXEN 500 MG/1
500 TABLET ORAL 2 TIMES DAILY
COMMUNITY
Start: 2024-03-12

## 2024-04-11 RX ORDER — DOXYCYCLINE HYCLATE 100 MG
100 TABLET ORAL 2 TIMES DAILY
COMMUNITY
Start: 2024-01-12

## 2024-04-11 RX ORDER — ISOSORBIDE MONONITRATE 60 MG/1
1 TABLET, EXTENDED RELEASE ORAL DAILY
COMMUNITY
Start: 2024-03-12 | End: 2024-05-20

## 2024-04-11 RX ORDER — SILDENAFIL 100 MG/1
TABLET, FILM COATED ORAL
COMMUNITY
Start: 2023-12-12

## 2024-04-11 RX ORDER — MONTELUKAST SODIUM 10 MG/1
1 TABLET ORAL DAILY
COMMUNITY

## 2024-04-11 NOTE — PROGRESS NOTES
[facility]    RTC 3 mo IOP, OCT nerve, DFE  Patient states that he is still having issues with a scrratchy feeling in   his left eye that is more at night and late afternoon so he mostly keeps   it closed and he is still doing the lid scrubs several times a day with   little relief. Vision is blurrier and still having floaters, his ointment   is not really helping anymore.   Last edited by Fozia Mohamud MA on 4/11/2024  9:03 AM.            Assessment /Plan     For exam results, see Encounter Report.    Primary open angle glaucoma (POAG) of left eye, severe stage    Optic atrophy of left eye    Dry eye syndrome of both eyes      Testing  Tmax 28//38  //565    OCT RNFL  3/2017: 77//55 ( S, I red, T borderline, N green)  1/2019 OD: 81 yellow S, otherwise green // unable OS  12/22/20  OD - 80, yellow S, remainder green  OS - poor quality, red S  10/2023: all green // all red  4/11/24:  79, Red S, Rest green     Gonio  9/29/2020  OD: Open to   OS: Open to SS/  12/2014 Open OU    HVF 24-2  2/18/15 -- Full OD/IAD OS  3/2017 Full OD/ IAD +progression OS  5/13/19 OD only: full and reliable  9/29/2020  OD: 1/10 losses, reliable, early superior arcuate defect  OS: 0/11 losses, total defect  12/23/21  OD: few scattered superior defects, grossly full   OS: generalized depression  1/11/24  OD: FL 0/11, FP 0%, FN 0%, nonspecific scattered defects, essentially full  OS: unable     B-scan OS 7/24/20 - flat and attached, no tears/detachments appreciated      Assessment/Plan  1. Severe POAG OS  2. s/p CEIOL + AGV OS 10/29/2020  - s/p trab with multiple revisions, Due to persistent bleb leak with resultant hypotony with maculopathy s/p trab (11/18/17), trab revision x4 (2/21/18, 2/26/18, 4/3/18, 5/29/18)  - Tmax 38  - //565  - Timolol was discontinued as patient reports it gave him arrhythmias, so will attempt trial off Timolol  - Continue Brimonidine and Brinzolamide TID, Latanoprost QHS  - 10/23: IOP OK at 18, not  using massage, pressure is adequate given poor vision potential  - 1/11/24: IOP stable at 18//14, using ocular massage sometimes; HVF full OD // unable OS; continue current regimen  - 4/11/24: IOP 16//13, stable. Pt using ocular massage 0-1x/day    3. Open angle with borderline findings, high risk OD  - IOP stable; in fact likely not glaucomatous due to normal CD and IOP, although high risk  - Tmax 28   - Continue Alphagan and Brinzolamide TID, Latanoprost QHS  - Good IOP today - can consider SLT in the future if becomes uncontrolled  - OCTn 07/23 stable, all green OD, stable thinning OS  - 4/11/24: IOP 16//13, OCT RNFL grossly stable w/ small C/D and poor tracing. Avg thickness unchanged from 2017  - low concern for glaucoma at this time but will continue to treat as precautionary measure    4. Ptosis OS  - Pt states that he only has noticed it since the surgery  - MRD1 0.5 OS  - doesn't bother pt    5. S/p CEIOL OD  - 7/26/23 yag cap, BCVA 20/25    6. Dry Eye Syndrome vs Glaucoma Medicamentosa OS>OD  - PFATs 6 times a day, WC/LS. Start erythromycin jean OD (ok to also use OU) for dense confluent PEEs   - Excess glaucoma drops in right eye may be contributing factor  - Discussed punctal plugs vs restasis, pt interested in restasis if possible (has never tried). Told to increase PFATs to at least 6 times daily and continue jean.   - could trial SLT OD to lower IOP and reduce drop burden  - could switch to preservative free glaucoma drops  - 1/11/24: patient using artificial tears q2-3hr and ointment at night; irritation improved significantly with ointment.   - 4/11/24: Still with irritation mostly evening and overnight. Using PFATs BID-TID and ointment qhs. On CPAP, floppy lids.   - Recommend lid taping at bedtime   - Increase PFATs to 6-8x/day  - Start WCs and LS BID     7. Optic atrophy OS  - May be secondary to the hypotony in 2017/2018    8. PVD OD  - 4/11/24: Pt reports increase in floaters over last few mo. No  flashes/curtains   - DFE 4/11/24 w/o h/t/d  - Strict RD precautions given  - Monocular precautions   - Repeat DFE in 3-4 mo given monocular status, sooner PRN       RTC 3-4 mo IOP, sx/k check, DFE

## 2024-05-20 ENCOUNTER — OFFICE VISIT (OUTPATIENT)
Dept: CARDIOLOGY | Facility: CLINIC | Age: 69
End: 2024-05-20
Payer: MEDICARE

## 2024-05-20 VITALS
TEMPERATURE: 98 F | OXYGEN SATURATION: 99 % | HEIGHT: 71 IN | HEART RATE: 78 BPM | BODY MASS INDEX: 34.47 KG/M2 | DIASTOLIC BLOOD PRESSURE: 76 MMHG | WEIGHT: 246.19 LBS | RESPIRATION RATE: 20 BRPM | SYSTOLIC BLOOD PRESSURE: 144 MMHG

## 2024-05-20 DIAGNOSIS — G47.33 OSA TREATED WITH BIPAP: ICD-10-CM

## 2024-05-20 DIAGNOSIS — R42 DIZZINESS: ICD-10-CM

## 2024-05-20 DIAGNOSIS — I25.111 ATHEROSCLEROSIS OF NATIVE CORONARY ARTERY WITH ANGINA PECTORIS WITH DOCUMENTED SPASM, UNSPECIFIED WHETHER NATIVE OR TRANSPLANTED HEART: ICD-10-CM

## 2024-05-20 DIAGNOSIS — I25.118 CORONARY ARTERY DISEASE OF NATIVE ARTERY OF NATIVE HEART WITH STABLE ANGINA PECTORIS: ICD-10-CM

## 2024-05-20 DIAGNOSIS — I65.29 STENOSIS OF CAROTID ARTERY, UNSPECIFIED LATERALITY: ICD-10-CM

## 2024-05-20 DIAGNOSIS — E78.5 HYPERLIPIDEMIA LDL GOAL <70: ICD-10-CM

## 2024-05-20 DIAGNOSIS — I25.10 CORONARY ARTERY DISEASE INVOLVING NATIVE CORONARY ARTERY OF NATIVE HEART WITHOUT ANGINA PECTORIS: Primary | ICD-10-CM

## 2024-05-20 DIAGNOSIS — R06.09 DOE (DYSPNEA ON EXERTION): ICD-10-CM

## 2024-05-20 DIAGNOSIS — I10 PRIMARY HYPERTENSION: ICD-10-CM

## 2024-05-20 PROCEDURE — 3008F BODY MASS INDEX DOCD: CPT | Mod: CPTII,,, | Performed by: NURSE PRACTITIONER

## 2024-05-20 PROCEDURE — 3077F SYST BP >= 140 MM HG: CPT | Mod: CPTII,,, | Performed by: NURSE PRACTITIONER

## 2024-05-20 PROCEDURE — 1159F MED LIST DOCD IN RCRD: CPT | Mod: CPTII,,, | Performed by: NURSE PRACTITIONER

## 2024-05-20 PROCEDURE — 99215 OFFICE O/P EST HI 40 MIN: CPT | Mod: PBBFAC | Performed by: NURSE PRACTITIONER

## 2024-05-20 PROCEDURE — 4010F ACE/ARB THERAPY RXD/TAKEN: CPT | Mod: CPTII,,, | Performed by: NURSE PRACTITIONER

## 2024-05-20 PROCEDURE — 99214 OFFICE O/P EST MOD 30 MIN: CPT | Mod: S$PBB,,, | Performed by: NURSE PRACTITIONER

## 2024-05-20 PROCEDURE — 3078F DIAST BP <80 MM HG: CPT | Mod: CPTII,,, | Performed by: NURSE PRACTITIONER

## 2024-05-20 PROCEDURE — 1160F RVW MEDS BY RX/DR IN RCRD: CPT | Mod: CPTII,,, | Performed by: NURSE PRACTITIONER

## 2024-05-20 PROCEDURE — 1101F PT FALLS ASSESS-DOCD LE1/YR: CPT | Mod: CPTII,,, | Performed by: NURSE PRACTITIONER

## 2024-05-20 PROCEDURE — 3288F FALL RISK ASSESSMENT DOCD: CPT | Mod: CPTII,,, | Performed by: NURSE PRACTITIONER

## 2024-05-20 RX ORDER — CARVEDILOL 6.25 MG/1
6.25 TABLET ORAL 2 TIMES DAILY
Qty: 180 TABLET | Refills: 3 | Status: SHIPPED | OUTPATIENT
Start: 2024-05-20 | End: 2024-06-19

## 2024-05-20 NOTE — PROGRESS NOTES
CHIEF COMPLAINT:   Chief Complaint   Patient presents with    Follow-up     Denies any cardiac problems                Review of patient's allergies indicates:   Allergen Reactions    Penicillins      Other reaction(s): Passed out                                          HPI:  Doug Faye 68 y.o. male with a past medical history of nonobstructive CAD per [facility] Aug. 2023, HLD, HTN, obesity, ANGELITA/BiPAP, and GERD who presents to cardiology clinic for routine follow up and results of testing.  At his last clinic visit the patient endorsed ongoing exertional dyspnea. He completed a subsequent Echocardiogram on 12.28.23 that revealed a preserved EF of 57% and mild tricuspid regurgitation.  Due to previous complaints of intermittent midsternal chest pressure, the patient completed a Nuclear Stress Test on 7.24.23 that was abnormal revealing a moderate-sized, moderate intensity reversible perfusion abnormality consistent with ischemia in RCA territory.  He underwent a subsequent Left Heart Catheterization on 8.21.23 that revealed nonobstructive CAD.  Notably, the patient underwent a previous LHC on 3.15.21 that revealed nonobstructive CAD with ectatic RCA. BINH of BLE completed on 12.28.23 in which the post-stress BINH drop mildly bilaterally. (See reports below).    Patient presents to clinic today endorsing stable episodes of occasional shortness of breath with exertion.  However, he is able to perform his basic ADLs and activities without shortness of breath or chest pain.  He denies any acute chest pain, palpitations, orthopnea, PND, peripheral edema, claudication, lightheadedness, or syncope.  He does endorse intermittent dizziness when changing positions, such as standing up from a chair but denies any actual near-syncope or syncope.  Activity wise, the patient states that he has attempted to ambulate more often, but has limitation secondary to chronic back pain.  He remains compliant with his current medications and is  currently tolerating without issue.  He endorses nightly compliance with BiPAP and feels he is benefitting from use.      CARDIAC TESTING:  ECHO 12.28.23    Left Ventricle: The left ventricle is normal in size. Normal wall thickness. There is normal systolic function. Biplane (2D) method of discs ejection fraction is 57%.    Right Ventricle: Normal right ventricular cavity size. Systolic function is normal.    Left Atrium: Left atrium is mildly dilated.    Right Atrium: Right atrium is mildly dilated.    Tricuspid Valve: There is mild regurgitation.    BINH of BLE 12.28.23  There are normal multiphasic Doppler waveforms at the right ankle.   The resting BINH on the right is normal at 1.24.  The post stress BINH on the right dropped mildly to 0.94.     There are normal multiphasic Doppler waveforms at the left ankle.   The resting BINH on the left is normal at 1.21.  The post stress BINH on the left dropped mildly to 1.01.      Cardiac Catheterization 8.21.23  Summary    The Prox RCA to Mid RCA lesion was 50% stenosed.    The RPDA lesion was 50% stenosed.    The pre-procedure left ventricular end diastolic pressure was 18.    The estimated blood loss was <50 mL.    There was non-obstructive coronary artery disease..     LVEDP:   18 mmHg     RECOMMENDATIONS  Medical management  Risk factor modifications     Nuclear Stress Test    Abnormal myocardial perfusion scan.    There is a moderate intensity, moderate sized, reversible perfusion abnormality that is consistent with ischemia in the basal to mid inferolateral wall(s) in the typical distribution of the RCA territory.    There are no other significant perfusion abnormalities.    The gated perfusion images showed an ejection fraction of 55% at rest. The gated perfusion images showed an ejection fraction of 63% post stress.    The patient reported no chest pain during the stress test.    There were no arrhythmias during stress.     On the nuclear stress test the EF is  normal.  If the patient has an echo, the EF on the echo is considered more accurate.         The patient has a moderate risk nuclear stress test.      If clinically indicated, consider further evaluation with coronary angiogram.    PFT testing July 14, 2021:  Normal PFT    TTE 4.14.21  Left ventricular ejection fraction is measured approximately 50 to 55%  Structurally normal mitral valve  Structurally aortic valve is trileaflet  Tricuspid valve is structurally normal  There is mild tricuspid regurgitation with RVSP estimated at 34 mmHg  Pulmonic valve is grossly normal  Pulmonic regurgitation present  Mildly dilated left atrium  Left ventricular ejection fraction is measured approximately 50 to 55%  Mildly dilated right atrium  Normal right ventricular size with preserved RV function    Lexiscan stress test 2/25/2021:  Inferior and lateral ischemia  No scar    Echocardiogram January 24, 2017:  Normal left ventricular cavity with overall normal systolic function, EF measured at approximately 59%    Echocardiogram April 25, 2016:  Normal left ventricular cavity with overall normal systolic function-assessed at 63%. Mild MR. Normal aortic valve. Mild TR. Trace pulmonic regurgitation is present. Mildly dilated left atrium. E/A flow reversal noted. Suggestive of diastolic dysfunction. Mildly enlarged right atrium size. Normal right ventricular size with preserved RV function. No evidence of significant pericardial effusion is noted.    Lexiscan 2/23/16  Impression:  Normal myocardial perfusion study. Negative for ischemia. Mildly  reduced LVEF of 46%       Patient Active Problem List   Diagnosis    Coronary artery disease involving native coronary artery of native heart without angina pectoris    Hyperlipidemia LDL goal <70    Primary hypertension    Other chest pain    QUIROZ (dyspnea on exertion)    ANGELITA treated with BiPAP    PCO (posterior capsular opacification), right    Primary open angle glaucoma (POAG) of right eye,  mild stage     Past Surgical History:   Procedure Laterality Date    ANGIOGRAM, CORONARY, WITH LEFT HEART CATHETERIZATION Left 8/21/2023    Procedure: Angiogram, Coronary, with Left Heart Cath;  Surgeon: Kishan De La Rosa MD;  Location: [facility] CATH LAB;  Service: Cardiology;  Laterality: Left;    APPENDECTOMY      blebectomy      COLONOSCOPY  07/25/2014    Dr Yariel Razo    HERNIA REPAIR       Social History     Socioeconomic History    Marital status:    Tobacco Use    Smoking status: Never    Smokeless tobacco: Never   Substance and Sexual Activity    Alcohol use: Not Currently    Drug use: Never        Family History   Problem Relation Name Age of Onset    Hypertension Mother      Heart failure Father      Hypertension Father      Heart failure Sister      Hypertension Sister      Heart attack Sister      Stroke Brother       Current Outpatient Medications:     albuterol (PROVENTIL) 2.5 mg /3 mL (0.083 %) nebulizer solution, , Disp: , Rfl:     amoxicillin-clavulanate 875-125mg (AUGMENTIN) 875-125 mg per tablet, Take 1 tablet every 12 hours by oral route., Disp: , Rfl:     aspirin (ECOTRIN) 81 MG EC tablet, Take 1 tablet (81 mg total) by mouth once daily., Disp: 90 tablet, Rfl: 3    atorvastatin (LIPITOR) 20 MG tablet, Take 1 tablet (20 mg total) by mouth once daily., Disp: 90 tablet, Rfl: 3    azithromycin (Z-NOEL) 250 MG tablet, TAKE 2 TABLETS (500 MG) BY ORAL ROUTE ONCE DAILY FOR 1 DAY THEN 1 TABLET (250 MG) BY ORAL ROUTE ONCE DAILY FOR 4 DAYS, Disp: , Rfl:     baclofen (LIORESAL) 20 MG tablet, Take 1 tablet every day by oral route at bedtime., Disp: , Rfl:     brimonidine 0.15 % OPTH DROP (ALPHAGAN) 0.15 % ophthalmic solution, Place 1 drop into both eyes 3 (three) times daily., Disp: 10 mL, Rfl: 5    brinzolamide (AZOPT) 1 % ophthalmic suspension, Place 1 drop into both eyes 3 (three) times daily., Disp: 10 mL, Rfl: 5    carvediloL (COREG) 6.25 MG tablet, Take 1 tablet (6.25 mg total) by mouth 2 (two)  times daily., Disp: 180 tablet, Rfl: 3    ciprofloxacin HCl (CILOXAN) 0.3 % ophthalmic solution, INSTILL 2 DROPS EVERY 6 HOURS TO (R) EAR., Disp: , Rfl:     ciprofloxacin-dexAMETHasone 0.3-0.1% (CIPRODEX) 0.3-0.1 % DrpS, Instill 2 drops every 6 hours by otic route., Disp: , Rfl:     docusate sodium (COLACE) 100 MG capsule, 1 capsule as needed., Disp: , Rfl:     doxycycline (VIBRA-TABS) 100 MG tablet, Take 100 mg by mouth 2 (two) times daily., Disp: , Rfl:     DULoxetine (CYMBALTA) 60 MG capsule, Take 60 mg by mouth once daily., Disp: , Rfl:     latanoprost 0.005 % ophthalmic solution, Place 1 drop into both eyes once daily., Disp: 2.5 mL, Rfl: 5    lisinopriL-hydrochlorothiazide (PRINZIDE,ZESTORETIC) 20-12.5 mg per tablet, Take 1 tablet by mouth once daily., Disp: 90 tablet, Rfl: 3    nitroGLYCERIN (NITROSTAT) 0.4 MG SL tablet, Place 1 tablet (0.4 mg total) under the tongue every 5 (five) minutes as needed for Chest pain., Disp: 25 tablet, Rfl: 6    pantoprazole (PROTONIX) 40 MG tablet, Take 40 mg by mouth once daily., Disp: , Rfl:     tamsulosin (FLOMAX) 0.4 mg Cap, Take 1 capsule by mouth once daily., Disp: , Rfl:     traZODone (DESYREL) 50 MG tablet, Take 50 mg by mouth nightly., Disp: , Rfl:     ANDROGEL 20.25 mg/1.25 gram (1.62 %) GlPm, Apply 2 pumps every day by transdermal route. (Patient not taking: Reported on 5/20/2024), Disp: , Rfl:     cetirizine (ZYRTEC) 10 MG tablet, Take 1 tablet by mouth once daily. (Patient not taking: Reported on 5/20/2024), Disp: , Rfl:     diphenoxylate-atropine 2.5-0.025 mg (LOMOTIL) 2.5-0.025 mg per tablet, , Disp: , Rfl:     erythromycin (ROMYCIN) ophthalmic ointment, , Disp: , Rfl:     gabapentin (NEURONTIN) 600 MG tablet, Take 600 mg by mouth 3 (three) times daily. (Patient not taking: Reported on 5/20/2024), Disp: , Rfl:     HYDROcodone-acetaminophen (NORCO)  mg per tablet, 1 tablet as needed. (Patient not taking: Reported on 5/20/2024), Disp: , Rfl:     isosorbide  mononitrate (IMDUR) 60 MG 24 hr tablet, Take 1 tablet by mouth once daily. (Patient not taking: Reported on 5/20/2024), Disp: , Rfl:     loperamide (IMODIUM) 2 mg capsule, , Disp: , Rfl:     methocarbamoL (ROBAXIN) 750 MG Tab, Take 750 mg by mouth 3 (three) times daily. (Patient not taking: Reported on 5/20/2024), Disp: , Rfl:     methylPREDNISolone (MEDROL DOSEPACK) 4 mg tablet, follow package directions (Patient not taking: Reported on 5/20/2024), Disp: , Rfl:     metoclopramide HCl (REGLAN) 10 MG tablet, , Disp: , Rfl:     montelukast (SINGULAIR) 10 mg tablet, Take 1 tablet by mouth once daily. (Patient not taking: Reported on 5/20/2024), Disp: , Rfl:     naproxen (NAPROSYN) 500 MG tablet, Take 500 mg by mouth 2 (two) times daily. (Patient not taking: Reported on 5/20/2024), Disp: , Rfl:     predniSONE (DELTASONE) 20 MG tablet, Take 1 tablet every day by oral route for 3 days. (Patient not taking: Reported on 5/20/2024), Disp: , Rfl:     sildenafiL (VIAGRA) 100 MG tablet, TAKE ONE TABLET BY MOUTH ONE HALF HOUR PRIOR TO SEX (Patient not taking: Reported on 5/20/2024), Disp: , Rfl:     white petrolatum-mineral oiL 94-3 % Oint, Place into both eyes every evening. (Patient not taking: Reported on 5/20/2024), Disp: 7 g, Rfl: 3    Current Facility-Administered Medications:     diazePAM tablet 5 mg, 5 mg, Oral, On Call Procedure, Jose, Laterica L, FNP     ROS:                                                                                                                                                                             Review of Systems   Constitutional: Negative.    Eyes:         Visual impairment   Respiratory:  Positive for shortness of breath.    Cardiovascular:  Positive for chest pain and palpitations.   Gastrointestinal: Negative.    Musculoskeletal: Negative.    Skin: Negative.    Neurological: Negative.    Psychiatric/Behavioral: Negative.          Blood pressure (!) 142/76, pulse 78, temperature  "98.1 °F (36.7 °C), temperature source Oral, resp. rate 20, height 5' 11" (1.803 m), weight 111.7 kg (246 lb 3.2 oz), SpO2 99%.     PE:  Physical Exam  Constitutional:       Appearance: Normal appearance.   HENT:      Head: Normocephalic.   Eyes:      Extraocular Movements: Extraocular movements intact.   Cardiovascular:      Rate and Rhythm: Normal rate and regular rhythm.   Pulmonary:      Effort: Pulmonary effort is normal.   Abdominal:      Palpations: Abdomen is soft.   Musculoskeletal:         General: Normal range of motion.      Cervical back: Neck supple.   Skin:     General: Skin is warm and dry.   Neurological:      General: No focal deficit present.      Mental Status: He is alert and oriented to person, place, and time.   Psychiatric:         Mood and Affect: Mood normal.       ASSESSMENT/PLAN:  Nonobstructive CAD  - LHC- nonobstructive CAD (prox-mid RCA 50% stenosis, RPDA lesion was 50% stenosed) (8.21.23)  - LHC - nonobstructive CAD with ectatic RCA (3.15.21)  - Abnormal Stress Test- moderate intensity, moderate sized, reversible perfusion abnormality that is consistent with ischemia in the basal to mid inferolateral wall(s) in the typical distribution of the RCA territory.(7.24.23)   - EF-57% per ECHO 12.28.23  - Denies CP. Reports stable episodes of occasional dyspnea on  - Continue Aspirin, Carvedilol 6.25 mg BID, Lisinopril/HCTZ 20/12.5 mg, Atorvastatin,and SL Nitro prn CP. Unable to tolerated Imdur due to dizziness  - Counseled on the importance of consuming a heart healthy diet and exercise as tolerated     QUIROZ -stable   - EF- 57% and mild MR per ECHO 12.28.23  - EF - 50-55% per TTE 4.14.21  - PFTs July 14, 2021 - normal      HTN  - BP above goal   - Continue Carvedilol 6.25 mg BID, Lisinopril/HCTZ 20/12.5 mg for now   - Instructed patient to monitor and log blood pressure and call with readings in 2 weeks.  If BP remains above goal will plan to increase carvedilol to 12.5 mg BID as BP/HR allows "   - Counseled on low salt diet and exercise as tolerated     HLD  - LDL -25-at goal   - Continue Atorvastatin 20 MG and fish oil   - Counseled on the importance of obtaining a healthy BMI, and consuming a heart healthy, low-cholesterol diet  - Repeat Labs prior to next clinic visit     GERD  - Management per PCP    ANGELITA/BiPAP  - Patient reports nightly compliance with BiPAP and feels he is benefitting from use   - Recommend continued nightly BiPAP use       Chronic Back Pain  - Continue with pain management as directed    Obesity  - Encouraged weight loss through diet and increased activity as tolerated     Dizziness  - Reports intermittent dizziness    - Obtain Carotid US to assess for any obstructive stenosis, considering history and risk factors    Carotid US   Nurse visit in 2 weeks for BP check (call)   Follow up in Cardiology Clinic in 4 months with CMP/FLP or sooner if needed    Follow up with PCP as directed  Strict ED precautions

## 2024-05-20 NOTE — PATIENT INSTRUCTIONS
Carotid US   Nurse visit in 2 weeks for BP check (call)   Follow up in Cardiology Clinic in 4 months with CMP/FLP or sooner if needed    Follow up with PCP as directed  Strict ED precautions

## 2024-05-22 DIAGNOSIS — I65.23 BILATERAL CAROTID ARTERY STENOSIS: Primary | ICD-10-CM

## 2024-05-22 DIAGNOSIS — I65.29 STENOSIS OF CAROTID ARTERY, UNSPECIFIED LATERALITY: ICD-10-CM

## 2024-05-23 DIAGNOSIS — I65.23 BILATERAL CAROTID ARTERY STENOSIS: Primary | ICD-10-CM

## 2024-05-23 DIAGNOSIS — I34.0 NONRHEUMATIC MITRAL (VALVE) INSUFFICIENCY: ICD-10-CM

## 2024-05-23 DIAGNOSIS — I65.29 STENOSIS OF CAROTID ARTERY, UNSPECIFIED LATERALITY: Primary | ICD-10-CM

## 2024-06-18 ENCOUNTER — TELEPHONE (OUTPATIENT)
Dept: CARDIOLOGY | Facility: CLINIC | Age: 69
End: 2024-06-18
Payer: MEDICARE

## 2024-06-18 DIAGNOSIS — I10 PRIMARY HYPERTENSION: Primary | ICD-10-CM

## 2024-06-18 NOTE — TELEPHONE ENCOUNTER
BP readings   5/22/24  155/92 hr 70  5/23/24 155/91 hr 64  5/23/24 154/84 hr 79  5/24/24 137/85 hr 63   5/25/24 134/80 hr 69   5/26/24 137/83 hr 68  5/27/26  136/83 hr72  5/28/24 142/85 hr 70  6/29/24 137/86 hr 71  6/30/24 123/73 hr 65  6/31/24 159/95 hr 73  6/31/24 158/93 hr 70   7/1/24   156/90 hr 73   7/2/24   155/95 hr 59  7/4/24   133/85 hr 74   7/6/24   148/85 hr 74  7/7/24   161/91 hr 82  7/9/24   128/86 hr 76  7/10/24 148/86 hr 73  7/11/24 154/85 hr 78

## 2024-06-19 RX ORDER — CARVEDILOL 12.5 MG/1
12.5 TABLET ORAL 2 TIMES DAILY WITH MEALS
Qty: 30 TABLET | Refills: 11 | Status: SHIPPED | OUTPATIENT
Start: 2024-06-19

## 2024-06-19 RX ORDER — CARVEDILOL 12.5 MG/1
12.5 TABLET ORAL 2 TIMES DAILY WITH MEALS
COMMUNITY
End: 2024-06-19

## 2024-06-19 NOTE — TELEPHONE ENCOUNTER
Pt advised and verbalized understanding. Nv scheduled.     Unruly Garcia FNP Jagneaux, Misty, LPN  Caller: Unspecified (Yesterday,  1:09 PM)  BP log reviewed and are above goal on average.  Please advise patient to increase carvedilol to 12.5 mg BID for better BP control.  The patient will need an inpatient nurse visit in 2-3 weeks for BP/HR check.  Also please advise on low-sodium diet    Thank you !  Unruly Garcia FN- BC          Previous Messages    bp log  (Newest Message First)  View All Conversations on this Encounter  Unruly Garcia FNP  You17 hours ago (2:44 PM)     [place]  BP log reviewed and are above goal on average.  Please advise patient to increase carvedilol to 12.5 mg BID for better BP control.  The patient will need an inpatient nurse visit in 2-3 weeks for BP/HR check.  Also please advise on low-sodium diet    Thank you !  Unruly Garcia FN- BC     You routed conversation to Unruly Garcia FNP18 hours ago (1:53 PM)     You  Doug Faye [phone] hours ago (1:52 PM)     Doug Faye [phone]  You18 hours ago (1:52 PM)     You18 hours ago (1:51 PM)       BP readings   5/22/24  155/92 hr 70  5/23/24 155/91 hr 64  5/23/24 154/84 hr 79  5/24/24 137/85 hr 63   5/25/24 134/80 hr 69   5/26/24 137/83 hr 68  5/27/26  136/83 hr72  5/28/24 142/85 hr 70  6/29/24 137/86 hr 71  6/30/24 123/73 hr 65  6/31/24 159/95 hr 73  6/31/24 158/93 hr 70   7/1/24   156/90 hr 73   7/2/24   155/95 hr 59  7/4/24   133/85 hr 74   7/6/24   148/85 hr 74  7/7/24   161/91 hr 82  7/9/24   128/86 hr 76  7/10/24 148/86 hr 73  7/11/24 154/85 hr 78         Subjective:      Patient ID: Sheeba Navarro is a 21 y.o. female.    Chief Complaint: No chief complaint on file.      Patient reports some dysuria recently, some vaginal discharge.  Requesting to be tested for all STDs.  She did have chlamydia which was treated at the end of last year.    Review of Systems   Constitutional: Negative for activity change, appetite change, fatigue and fever.   Gastrointestinal: Negative for abdominal pain.   Genitourinary: Positive for dysuria, frequency, urgency and vaginal discharge.   Musculoskeletal: Negative for back pain.     History reviewed. No pertinent past medical history.       Past Surgical History:   Procedure Laterality Date     SECTION       History reviewed. No pertinent family history.  Social History     Socioeconomic History    Marital status: Single     Spouse name: Not on file    Number of children: Not on file    Years of education: Not on file    Highest education level: Not on file   Occupational History    Not on file   Social Needs    Financial resource strain: Not on file    Food insecurity     Worry: Not on file     Inability: Not on file    Transportation needs     Medical: Not on file     Non-medical: Not on file   Tobacco Use    Smoking status: Never Smoker    Smokeless tobacco: Never Used   Substance and Sexual Activity    Alcohol use: Not on file    Drug use: Not on file    Sexual activity: Not on file   Lifestyle    Physical activity     Days per week: Not on file     Minutes per session: Not on file    Stress: Not on file   Relationships    Social connections     Talks on phone: Not on file     Gets together: Not on file     Attends Spiritism service: Not on file     Active member of club or organization: Not on file     Attends meetings of clubs or organizations: Not on file     Relationship status: Not on file   Other Topics Concern    Not on file   Social History Narrative    Not on file     Review of patient's  "allergies indicates:  No Known Allergies    Objective:       /78 (BP Location: Right arm)   Pulse 90   Temp 99.7 °F (37.6 °C) (Tympanic)   Ht 5' 2" (1.575 m)   Wt 72 kg (158 lb 11.7 oz)   SpO2 100%   BMI 29.03 kg/m²   Physical Exam  Vitals signs and nursing note reviewed.   Constitutional:       General: She is not in acute distress.     Appearance: She is well-developed. She is not diaphoretic.   Cardiovascular:      Rate and Rhythm: Normal rate and regular rhythm.      Heart sounds: Normal heart sounds.   Pulmonary:      Effort: Pulmonary effort is normal. No respiratory distress.      Breath sounds: Normal breath sounds.   Abdominal:      General: Bowel sounds are normal.      Palpations: Abdomen is soft.      Tenderness: There is no abdominal tenderness. There is no guarding.      Comments: No CVA tenderness   Psychiatric:         Mood and Affect: Mood normal.         Behavior: Behavior normal.       Assessment:     1. Dysuria    2. Routine adult health maintenance    3. Possible exposure to STD      Plan:   Dysuria  -     URINALYSIS; Future; Expected date: 07/07/2020  -     C. trachomatis/N. gonorrhoeae by AMP DNA [facility]; Urine; Future; Expected date: 07/07/2020  -     Urine culture; Future  -     HIV 1/2 Ag/Ab (4th Gen); Future; Expected date: 07/07/2020  -     HEPATITIS PANEL, ACUTE; Future; Expected date: 07/07/2020  -     RPR; Future; Expected date: 07/07/2020  -     Lipid Panel; Future; Expected date: 07/07/2020  -     Comprehensive metabolic panel; Future; Expected date: 01/03/2021  -     CBC auto differential; Future; Expected date: 07/07/2020    Routine adult health maintenance  -     HIV 1/2 Ag/Ab (4th Gen); Future; Expected date: 07/07/2020  -     HEPATITIS PANEL, ACUTE; Future; Expected date: 07/07/2020  -     RPR; Future; Expected date: 07/07/2020  -     Lipid Panel; Future; Expected date: 07/07/2020  -     Comprehensive metabolic panel; Future; Expected date: 01/03/2021  -     CBC auto " differential; Future; Expected date: 07/07/2020    Possible exposure to STD     Did not obtain vaginosis swab as there is a shortage of the swabs currently, not available.  Medication List with Changes/Refills   Discontinued Medications    FLUCONAZOLE (DIFLUCAN) 150 MG TAB    Take first tablet today, then repeat in 3 days.    SULFAMETHOXAZOLE-TRIMETHOPRIM 800-160MG (BACTRIM DS) 800-160 MG TAB    Take 1 tablet by mouth 2 (two) times daily.

## 2024-07-10 ENCOUNTER — CLINICAL SUPPORT (OUTPATIENT)
Dept: CARDIOLOGY | Facility: CLINIC | Age: 69
End: 2024-07-10
Payer: MEDICARE

## 2024-07-10 VITALS
BODY MASS INDEX: 34.02 KG/M2 | WEIGHT: 243 LBS | RESPIRATION RATE: 18 BRPM | TEMPERATURE: 98 F | DIASTOLIC BLOOD PRESSURE: 62 MMHG | HEIGHT: 71 IN | SYSTOLIC BLOOD PRESSURE: 118 MMHG | HEART RATE: 66 BPM | OXYGEN SATURATION: 98 %

## 2024-07-10 DIAGNOSIS — I10 PRIMARY HYPERTENSION: Primary | ICD-10-CM

## 2024-07-10 PROCEDURE — 99211 OFF/OP EST MAY X REQ PHY/QHP: CPT | Mod: S$PBB,,, | Performed by: NURSE PRACTITIONER

## 2024-07-10 PROCEDURE — 99215 OFFICE O/P EST HI 40 MIN: CPT | Mod: PBBFAC

## 2024-07-10 NOTE — PROGRESS NOTES
Patient saw for nurse visit after coreg increased to 12.5 mg bid. He is compliant with medications and did obtain home bp log. He reports discrepancies with his home monitor. Bp checked in clinic 115/71 hr 66, repeated manually and noted to be 118/62. Presented to JOSE MANUEL Garcia NP who advised pt to continue current medications and pt advised to try a different bp monitor at home. Low na diet encouraged and ED precautions reviewed with patient. Understanding verbalized.

## 2024-07-16 ENCOUNTER — HOSPITAL ENCOUNTER (OUTPATIENT)
Dept: RADIOLOGY | Facility: HOSPITAL | Age: 69
Discharge: HOME | End: 2024-07-16
Attending: NURSE PRACTITIONER
Payer: MEDICARE

## 2024-07-16 DIAGNOSIS — I65.23 BILATERAL CAROTID ARTERY STENOSIS: ICD-10-CM

## 2024-07-16 LAB
LEFT CCA DIST DIAS: 19 CM/S
LEFT CCA DIST SYS: 87 CM/S
LEFT CCA PROX DIAS: 22 CM/S
LEFT CCA PROX SYS: 108 CM/S
LEFT ECA DIAS: 13 CM/S
LEFT ECA SYS: 111 CM/S
LEFT ICA DIST DIAS: 22 CM/S
LEFT ICA DIST SYS: 72 CM/S
LEFT ICA MID DIAS: 14 CM/S
LEFT ICA MID SYS: 62 CM/S
LEFT ICA PROX DIAS: 14 CM/S
LEFT ICA PROX SYS: 95 CM/S
LEFT VERTEBRAL DIAS: 18 CM/S
LEFT VERTEBRAL SYS: 59 CM/S
OHS CV CAROTID RIGHT ICA EDV HIGHEST: 27
OHS CV CAROTID ULTRASOUND LEFT ICA/CCA RATIO: 1.09
OHS CV CAROTID ULTRASOUND RIGHT ICA/CCA RATIO: 1.41
OHS CV PV CAROTID LEFT HIGHEST CCA: 108
OHS CV PV CAROTID LEFT HIGHEST ICA: 95
OHS CV PV CAROTID RIGHT HIGHEST CCA: 100
OHS CV PV CAROTID RIGHT HIGHEST ICA: 93
OHS CV US CAROTID LEFT HIGHEST EDV: 22
RIGHT CCA DIST DIAS: 16 CM/S
RIGHT CCA DIST SYS: 66 CM/S
RIGHT CCA PROX DIAS: 17 CM/S
RIGHT CCA PROX SYS: 100 CM/S
RIGHT ECA DIAS: 14 CM/S
RIGHT ECA SYS: 146 CM/S
RIGHT ICA DIST DIAS: 24 CM/S
RIGHT ICA DIST SYS: 93 CM/S
RIGHT ICA MID DIAS: 27 CM/S
RIGHT ICA MID SYS: 92 CM/S
RIGHT ICA PROX DIAS: 13 CM/S
RIGHT ICA PROX SYS: 56 CM/S
RIGHT VERTEBRAL DIAS: 14 CM/S
RIGHT VERTEBRAL SYS: 58 CM/S

## 2024-07-16 PROCEDURE — 93880 EXTRACRANIAL BILAT STUDY: CPT

## 2024-07-17 ENCOUNTER — TELEPHONE (OUTPATIENT)
Dept: CARDIOLOGY | Facility: CLINIC | Age: 69
End: 2024-07-17
Payer: MEDICARE

## 2024-07-19 ENCOUNTER — OFFICE VISIT (OUTPATIENT)
Dept: OPHTHALMOLOGY | Facility: CLINIC | Age: 69
End: 2024-07-19
Payer: MEDICARE

## 2024-07-19 VITALS — HEIGHT: 71 IN | WEIGHT: 242.5 LBS | BODY MASS INDEX: 33.95 KG/M2

## 2024-07-19 VITALS — BODY MASS INDEX: 34.01 KG/M2 | HEIGHT: 71 IN | WEIGHT: 242.94 LBS

## 2024-07-19 DIAGNOSIS — H33.321 RETINAL HOLE OF RIGHT EYE: Primary | ICD-10-CM

## 2024-07-19 DIAGNOSIS — H40.1111 PRIMARY OPEN ANGLE GLAUCOMA (POAG) OF RIGHT EYE, MILD STAGE: ICD-10-CM

## 2024-07-19 DIAGNOSIS — H40.1123 PRIMARY OPEN ANGLE GLAUCOMA (POAG) OF LEFT EYE, SEVERE STAGE: Primary | ICD-10-CM

## 2024-07-19 PROCEDURE — 99214 OFFICE O/P EST MOD 30 MIN: CPT | Mod: PBBFAC,PN

## 2024-07-19 PROCEDURE — 99214 OFFICE O/P EST MOD 30 MIN: CPT | Mod: PBBFAC,27,PN

## 2024-07-19 NOTE — PROGRESS NOTES
HPI    RTC 3-4 mo IOP, sx/k check, DFE  Last edited by Fozia Mohamud MA on 7/19/2024  9:01 AM.            Assessment /Plan     For exam results, see Encounter Report.    There are no diagnoses linked to this encounter.    Testing  Tmax 28//38  //565    OCT RNFL  3/2017: 77//55 ( S, I red, T borderline, N green)  1/2019 OD: 81 yellow S, otherwise green // unable OS  12/22/20  OD - 80, yellow S, remainder green  OS - poor quality, red S  10/2023: all green // all red  4/11/24:  79, Red S, Rest green     Gonio  9/29/2020  OD: Open to   OS: Open to SS/  12/2014 Open OU    HVF 24-2  2/18/15 -- Full OD/IAD OS  3/2017 Full OD/ IAD +progression OS  5/13/19 OD only: full and reliable  9/29/2020  OD: 1/10 losses, reliable, early superior arcuate defect  OS: 0/11 losses, total defect  12/23/21  OD: few scattered superior defects, grossly full   OS: generalized depression  1/11/24  OD: FL 0/11, FP 0%, FN 0%, nonspecific scattered defects, essentially full  OS: unable     B-scan OS 7/24/20 - flat and attached, no tears/detachments appreciated      Assessment/Plan  1. Severe POAG OS  2. s/p CEIOL + AGV OS 10/29/2020  - s/p trab with multiple revisions, Due to persistent bleb leak with resultant hypotony with maculopathy s/p trab (11/18/17), trab revision x4 (2/21/18, 2/26/18, 4/3/18, 5/29/18)  - Tmax 38  - //565  - Timolol was discontinued as patient reports it gave him arrhythmias, so will attempt trial off Timolol  - Continue Brimonidine and Brinzolamide TID, Latanoprost QHS  - 10/23: IOP OK at 18, not using massage, pressure is adequate given poor vision potential  - 1/11/24: IOP stable at 18//14, using ocular massage sometimes; HVF full OD // unable OS; continue current regimen  - 4/11/24: IOP 16//13, stable. Pt using ocular massage 0-1x/day    3. Open angle with borderline findings, high risk OD  - IOP stable; in fact likely not glaucomatous due to normal CD and IOP, although high risk  - Tmax 28    - Continue Brimonidine and Brinzolamide TID, Latanoprost QHS  - Good IOP today - can consider SLT in the future if becomes uncontrolled  - OCTn 07/23 stable, all green OD, stable thinning OS  - 4/11/24: IOP 16//13, OCT RNFL grossly stable w/ small C/D and poor tracing. Avg thickness unchanged from 2017  - 7/19/24: IOP 22//17 elevated above normal OD, stable OS. Patient states has only been using Brim/Brin BID and also missed Brim this morning. Will bring back on a procedure day for SLT OD (added benefit of maybe getting off some drops for surface disease). Check IOP prior - if better controlled with better compliance can discuss with patient need for laser.   - low concern for glaucoma at this time but will continue to treat as precautionary measure    4. Ptosis OS  - Pt states that he only has noticed it since the surgery  - MRD1 0.5 OS  - doesn't bother pt    5. S/p CEIOL OD  - 7/26/23 yag cap, BCVA 20/25    6. Dry Eye Syndrome vs Glaucoma Medicamentosa OS>OD  - PFATs 6 times a day, WC/LS. Start erythromycin jean OD (ok to also use OU) for dense confluent PEEs   - Excess glaucoma drops in right eye may be contributing factor  - Discussed punctal plugs vs restasis, pt interested in restasis if possible (has never tried). Told to increase PFATs to at least 6 times daily and continue jean.   - could trial SLT OD to lower IOP and reduce drop burden  - could switch to preservative free glaucoma drops  - 1/11/24: patient using artificial tears q2-3hr and ointment at night; irritation improved significantly with ointment.   - 4/11/24: Still with irritation mostly evening and overnight. Using PFATs BID-TID and ointment qhs. On CPAP, floppy lids.   - Recommend lid taping at bedtime   - Increase PFATs to 6-8x/day  - Start WCs and LS BID     7. Optic atrophy OS  - May be secondary to the hypotony in 2017/2018    8. PVD OD  9. Atrophic holes, OD  - 4/11/24: Pt reports increase in floaters over last few mo. No flashes/curtains    - DFE 4/11/24 w/o h/t/d  - Strict RD precautions given  - Monocular precautions   - 7/19/24: 3 atrophic holes noted today on exam, far periphery at 3oclock. Melonie day today, will bring back after lunch to discuss barrier laser with Dr Wilhelm.     RTC Procedure day next avail for SLT OD and IOP check  RTC this afternoon 2PM (7/19/24) for Dr Wilhelm

## 2024-07-19 NOTE — PROGRESS NOTES
HPI    RTC this afternoon 2PM (7/19/24) for Dr Wilhelm  Last edited by Marge Menard, RN on 7/19/2024  1:39 PM.            Assessment /Plan     \[facility]\""    RTC this afternoon 2PM (7/19/24) for Dr Wilhelm  Last edited by Marge Menard, RN on 7/19/2024  1:39 PM.            Assessment /Plan     For exam results, see Encounter Report.    There are no diagnoses linked to this encounter.    Testing  Tmax 28//38  //565    OCT RNFL  3/2017: 77//55 ( S, I red, T borderline, N green)  1/2019 OD: 81 yellow S, otherwise green // unable OS  12/22/20  OD - 80, yellow S, remainder green  OS - poor quality, red S  10/2023: all green // all red  4/11/24:  79, Red S, Rest green     Gonio  9/29/2020  OD: Open to   OS: Open to SS/  12/2014 Open OU    HVF 24-2  2/18/15 -- Full OD/IAD OS  3/2017 Full OD/ IAD +progression OS  5/13/19 OD only: full and reliable  9/29/2020  OD: 1/10 losses, reliable, early superior arcuate defect  OS: 0/11 losses, total defect  12/23/21  OD: few scattered superior defects, grossly full   OS: generalized depression  1/11/24  OD: FL 0/11, FP 0%, FN 0%, nonspecific scattered defects, essentially full  OS: unable     B-scan OS 7/24/20 - flat and attached, no tears/detachments appreciated      Assessment/Plan    8. PVD OD  9. Atrophic holes, OD  - 4/11/24: Pt reports increase in floaters over last few mo. No flashes/curtains   - DFE 4/11/24 w/o h/t/d  - Monocular precautions   - 7/19/24: Seen with Dr Wilhelm - holes small with pigmentary changes surrounding. Would hold off on treatment at this time given location, unlikely to progress given small size and pigmentary changes.  - Retinal detachment precautions discussed at length     Not directly addressed at this visit    1. Severe POAG OS  2. s/p CEIOL + AGV OS 10/29/2020  - s/p trab with multiple revisions, Due to persistent bleb leak with resultant hypotony with maculopathy s/p trab (11/18/17), trab revision x4 (2/21/18, 2/26/18, 4/3/18, 5/29/18)  - Tmax  38  - //565  - Timolol was discontinued as patient reports it gave him arrhythmias, so will attempt trial off Timolol  - Continue Brimonidine and Brinzolamide TID, Latanoprost QHS  - 10/23: IOP OK at 18, not using massage, pressure is adequate given poor vision potential  - 1/11/24: IOP stable at 18//14, using ocular massage sometimes; HVF full OD // unable OS; continue current regimen  - 4/11/24: IOP 16//13, stable. Pt using ocular massage 0-1x/day    3. Open angle with borderline findings, high risk OD  - IOP stable; in fact likely not glaucomatous due to normal CD and IOP, although high risk  - Tmax 28   - Continue Brimonidine and Brinzolamide TID, Latanoprost QHS  - Good IOP today - can consider SLT in the future if becomes uncontrolled  - OCTn 07/23 stable, all green OD, stable thinning OS  - 4/11/24: IOP 16//13, OCT RNFL grossly stable w/ small C/D and poor tracing. Avg thickness unchanged from 2017  - 7/19/24: IOP 22//17 elevated above normal OD, stable OS. Patient states has only been using Brim/Brin BID and also missed Brim this morning. Will bring back on a procedure day for SLT OD (added benefit of maybe getting off some drops for surface disease). Check IOP prior - if better controlled with better compliance can discuss with patient need for laser.   - low concern for glaucoma at this time but will continue to treat as precautionary measure    4. Ptosis OS  - Pt states that he only has noticed it since the surgery  - MRD1 0.5 OS  - doesn't bother pt    5. S/p CEIOL OD  - 7/26/23 yag cap, BCVA 20/25    6. Dry Eye Syndrome vs Glaucoma Medicamentosa OS>OD  - PFATs 6 times a day, WC/LS. Start erythromycin jean OD (ok to also use OU) for dense confluent PEEs   - Excess glaucoma drops in right eye may be contributing factor  - Discussed punctal plugs vs restasis, pt interested in restasis if possible (has never tried). Told to increase PFATs to at least 6 times daily and continue jean.   - could trial SLT  OD to lower IOP and reduce drop burden  - could switch to preservative free glaucoma drops  - 1/11/24: patient using artificial tears q2-3hr and ointment at night; irritation improved significantly with ointment.   - 4/11/24: Still with irritation mostly evening and overnight. Using PFATs BID-TID and ointment qhs. On CPAP, floppy lids.   - Recommend lid taping at bedtime   - Increase PFATs to 6-8x/day  - Start WCs and LS BID     7. Optic atrophy OS  - May be secondary to the hypotony in 2017/2018        RTC Procedure day next avail for SLT OD and IOP check                                                          patient

## 2024-08-28 ENCOUNTER — CLINICAL SUPPORT (OUTPATIENT)
Dept: OPHTHALMOLOGY | Facility: CLINIC | Age: 69
End: 2024-08-28
Payer: MEDICARE

## 2024-08-28 VITALS — BODY MASS INDEX: 33.95 KG/M2 | WEIGHT: 242.5 LBS | HEIGHT: 71 IN

## 2024-08-28 DIAGNOSIS — H04.123 DRY EYE SYNDROME OF BOTH EYES: ICD-10-CM

## 2024-08-28 DIAGNOSIS — H52.11 MYOPIA OF RIGHT EYE WITH ASTIGMATISM AND PRESBYOPIA: ICD-10-CM

## 2024-08-28 DIAGNOSIS — H52.4 MYOPIA OF RIGHT EYE WITH ASTIGMATISM AND PRESBYOPIA: ICD-10-CM

## 2024-08-28 DIAGNOSIS — H40.1111 PRIMARY OPEN ANGLE GLAUCOMA (POAG) OF RIGHT EYE, MILD STAGE: Primary | ICD-10-CM

## 2024-08-28 DIAGNOSIS — H47.20 OPTIC ATROPHY OF LEFT EYE: ICD-10-CM

## 2024-08-28 DIAGNOSIS — H40.1123 PRIMARY OPEN ANGLE GLAUCOMA (POAG) OF LEFT EYE, SEVERE STAGE: ICD-10-CM

## 2024-08-28 DIAGNOSIS — H52.201 MYOPIA OF RIGHT EYE WITH ASTIGMATISM AND PRESBYOPIA: ICD-10-CM

## 2024-08-28 DIAGNOSIS — H02.889 MEIBOMIAN GLAND DYSFUNCTION: ICD-10-CM

## 2024-08-28 PROCEDURE — 99214 OFFICE O/P EST MOD 30 MIN: CPT | Mod: PBBFAC,PN

## 2024-08-28 RX ORDER — BRINZOLAMIDE/BRIMONIDINE TARTRATE 10; 2 MG/ML; MG/ML
1 SUSPENSION/ DROPS OPHTHALMIC 3 TIMES DAILY
Qty: 8 ML | Refills: 11 | Status: SHIPPED | OUTPATIENT
Start: 2024-08-28

## 2024-08-28 NOTE — PROGRESS NOTES
Assessment /Plan     For exam results, see Encounter Report.    There are no diagnoses linked to this encounter.    Testing  Tmax 28//38  //565    OCT RNFL  3/2017: 77//55 ( S, I red, T borderline, N green)  1/2019 OD: 81 yellow S, otherwise green // unable OS  12/22/20  OD - 80, yellow S, remainder green  OS - poor quality, red S  7/11/2023: 84//15, all green//poor study  10/2023: all green // all red  4/11/24:  77, Red S, Rest green (4/10 SS)  7/19//24: 79, red S, rest green (shifted superior peak)    Gonio  9/29/2020  OD: Open to   OS: Open to SS/  12/2014 Open OU    HVF 24-2  2/18/15 -- Full OD/IAD OS  3/2017 Full OD/ IAD +progression OS  5/13/19 OD only: full and reliable  9/29/2020  OD: 1/10 losses, reliable, early superior arcuate defect  OS: 0/11 losses, total defect  12/23/21  OD: few scattered superior defects, grossly full   OS: generalized depression  1/11/24  OD: FL 0/11, FP 0%, FN 0%, nonspecific scattered defects, essentially full  OS: unable     B-scan OS 7/24/20 - flat and attached, no tears/detachments appreciated      Assessment/Plan    1. Severe POAG OS  2. s/p CEIOL + AGV OS 10/29/2020  - s/p trab with multiple revisions, Due to persistent bleb leak with resultant hypotony with maculopathy s/p trab (11/18/17), trab revision x4 (2/21/18, 2/26/18, 4/3/18, 5/29/18)  - Tmax 38  - //565  - Timolol was discontinued as patient reports it gave him arrhythmias, so will attempt trial off Timolol  - Continue Brimonidine and Brinzolamide TID, Latanoprost QHS  - 10/23: IOP OK at 18, not using massage, pressure is adequate given poor vision potential  - 1/11/24: IOP stable at 18//14, using ocular massage sometimes; HVF full OD // unable OS; continue current regimen  - 4/11/24: IOP 16//13, stable. Pt using ocular massage 0-1x/day  - 8/2/24: IOP 14, no pain. Continue drops.    3. Open angle with borderline findings, high risk OD  - IOP stable; in fact likely not glaucomatous  due to normal CD and IOP, although high risk  - Tmax 28   - Goal ~19  - Current drops: Brimonidine and Brinzolamide TID, Latanoprost QHS  - 4/11/24: IOP 16//13, OCT RNFL grossly stable w/ small C/D and poor tracing. Avg thickness unchanged from 2017  - 7/19/24: IOP 22//17 elevated above normal OD, stable OS. Patient states has only been using Brim/Brin BID and also missed Brim this morning. Will bring back on a procedure day for SLT OD (added benefit of maybe getting off some drops for surface disease). Check IOP prior - if better controlled with better compliance can discuss with patient need for laser.   - 8/28/24: IOP 18 on current therapy. Will change brim/brinz to simbrinza to simplify regimen. Continue latanoprost. Will hold off on SLT today given good IOP and stable OCT RNFL over last 8 years.   - low concern for glaucoma at this time but will continue to treat as precautionary measure    4. Ptosis OS  - Pt states that he only has noticed it since the surgery  - MRD1 0.5 OS  - doesn't bother pt    5. S/p CEIOL OD  - 7/26/23 yag cap, BCVA 20/20    6. Dry Eye Syndrome vs Glaucoma Medicamentosa OS>OD  - PFATs 6 times a day, WC/LS. Start erythromycin jena OD (ok to also use OU) for dense confluent PEEs   - Excess glaucoma drops in right eye may be contributing factor  - Discussed punctal plugs vs restasis, pt interested in restasis if possible (has never tried). Told to increase PFATs to at least 6 times daily and continue jean.   - could trial SLT OD to lower IOP and reduce drop burden  - could switch to preservative free glaucoma drops  - 8/28/24: Not using WC or lid scrubs. Discussed proper technique. Recommend Sy mask and avenova scrubs along with PFAT 4-6x/day. If not improved with compliance, could try doxycycline.      7. Optic atrophy OS  - May be secondary to the hypotony in 2017/2018    8. PVD OD  9. Atrophic holes, OD  - 4/11/24: Pt reports increase in floaters over last few mo. No flashes/curtains    - DFE 4/11/24 w/o h/t/d  - Monocular precautions   - 7/19/24: Seen with Dr Wilhelm - holes small with pigmentary changes surrounding. Would hold off on treatment at this time given location, unlikely to progress given small size and pigmentary changes.  - Retinal detachment precautions discussed at length     RTC 3 months IOP/lid check.

## 2024-09-23 DIAGNOSIS — E78.5 HYPERLIPIDEMIA LDL GOAL <70: ICD-10-CM

## 2024-09-23 DIAGNOSIS — I10 PRIMARY HYPERTENSION: Primary | ICD-10-CM

## 2024-11-12 NOTE — PROGRESS NOTES
CHIEF COMPLAINT:   Chief Complaint   Patient presents with    Follow-up     Denies any cardiac problems                Review of patient's allergies indicates:   Allergen Reactions    Penicillins      Other reaction(s): Passed out                                          HPI:  Doug Faye 68 y.o. male with a past medical history of Nonobstructive CAD per [facility] Aug. 2023, HLD, HTN, carotid artery stenosis (<50% to CHANDNI), obesity, ANGELITA/BiPAP, and GERD who presents to cardiology clinic for routine follow up and results of testing. Carotid US obtained on 7.16.24 demonstrated less than 50% stenosis to the right internal carotid artery and no hemodynamically significant stenosis to left internal carotid artery. Latest Echocardiogram obtained on 12.28.23 revealed a preserved EF of 57% and mild tricuspid regurgitation.   Most recent ischemic evaluation includes a  Left Heart Catheterization on 8.21.23 due to an abnormal nuclear stress test that revealed nonobstructive CAD.  Notably, the patient underwent a previous C on 3.15.21 that revealed nonobstructive CAD with ectatic RCA. (See reports below).    Patient presents to clinic today endorsing that he feels stable from a cardiovascular standpoint.  He continues to report stable shortness of breath with exertion that has not progressed or worsened since last clinic visit.  He also continues to endorse fatigue and intermittent episodes of decreased energy levels.  Patient denies cardiovascular complaints of chest pain, orthopnea, PND, claudication, lightheadedness, near-syncope or syncope.  However, he does report occasional intermittent episodes of lower extremity edema and tiredness to his legs. To Note, the patient completed ABIs in December 2023 with normal resting ABIs and post-stress ABIs mildly dropped bilaterally.  The patient states that he is able to perform his routine ADLs but does not participate in any formal/exercise activity regimen.  He endorses compliance with  his current medications and is currently tolerating without issue.  He endorses nightly compliance with BiPAP and feels he is benefitting from use.      CARDIAC TESTING:  ECHO 12.28.23    Left Ventricle: The left ventricle is normal in size. Normal wall thickness. There is normal systolic function. Biplane (2D) method of discs ejection fraction is 57%.    Right Ventricle: Normal right ventricular cavity size. Systolic function is normal.    Left Atrium: Left atrium is mildly dilated.    Right Atrium: Right atrium is mildly dilated.    Tricuspid Valve: There is mild regurgitation.    BINH of BLE 12.28.23  There are normal multiphasic Doppler waveforms at the right ankle.   The resting BINH on the right is normal at 1.24.  The post stress BINH on the right dropped mildly to 0.94.     There are normal multiphasic Doppler waveforms at the left ankle.   The resting BINH on the left is normal at 1.21.  The post stress BINH on the left dropped mildly to 1.01.    Carotid US 7.16.24  The right internal carotid artery demonstrated less than a 50% stenosis.  There is a minimal amount of mixed echogenicity plaque in the proximal ICA.  The right vertebral artery was patent with antegrade flow.     The left internal carotid artery demonstrated no hemodynamically significant stenosis.  There was no significant plaque identified.  The left vertebral artery was patent with antegrade flow.        Cardiac Catheterization 8.21.23  Summary    The Prox RCA to Mid RCA lesion was 50% stenosed.    The RPDA lesion was 50% stenosed.    The pre-procedure left ventricular end diastolic pressure was 18.    The estimated blood loss was <50 mL.    There was non-obstructive coronary artery disease..     LVEDP:   18 mmHg     RECOMMENDATIONS  Medical management  Risk factor modifications     Nuclear Stress Test    Abnormal myocardial perfusion scan.    There is a moderate intensity, moderate sized, reversible perfusion abnormality that is consistent with  ischemia in the basal to mid inferolateral wall(s) in the typical distribution of the RCA territory.    There are no other significant perfusion abnormalities.    The gated perfusion images showed an ejection fraction of 55% at rest. The gated perfusion images showed an ejection fraction of 63% post stress.    The patient reported no chest pain during the stress test.    There were no arrhythmias during stress.     On the nuclear stress test the EF is normal.  If the patient has an echo, the EF on the echo is considered more accurate.         The patient has a moderate risk nuclear stress test.      If clinically indicated, consider further evaluation with coronary angiogram.    PFT testing July 14, 2021:  Normal PFT    TTE 4.14.21  Left ventricular ejection fraction is measured approximately 50 to 55%  Structurally normal mitral valve  Structurally aortic valve is trileaflet  Tricuspid valve is structurally normal  There is mild tricuspid regurgitation with RVSP estimated at 34 mmHg  Pulmonic valve is grossly normal  Pulmonic regurgitation present  Mildly dilated left atrium  Left ventricular ejection fraction is measured approximately 50 to 55%  Mildly dilated right atrium  Normal right ventricular size with preserved RV function    Lexiscan stress test 2/25/2021:  Inferior and lateral ischemia  No scar    Echocardiogram January 24, 2017:  Normal left ventricular cavity with overall normal systolic function, EF measured at approximately 59%    Echocardiogram April 25, 2016:  Normal left ventricular cavity with overall normal systolic function-assessed at 63%. Mild MR. Normal aortic valve. Mild TR. Trace pulmonic regurgitation is present. Mildly dilated left atrium. E/A flow reversal noted. Suggestive of diastolic dysfunction. Mildly enlarged right atrium size. Normal right ventricular size with preserved RV function. No evidence of significant pericardial effusion is noted.    Lexiscan 2/23/16  Impression:  Normal  myocardial perfusion study. Negative for ischemia. Mildly  reduced LVEF of 46%       Patient Active Problem List   Diagnosis    Coronary artery disease involving native coronary artery of native heart without angina pectoris    Hyperlipidemia LDL goal <70    Primary hypertension    QUIROZ (dyspnea on exertion)    ANGELITA treated with BiPAP    PCO (posterior capsular opacification), right    Primary open angle glaucoma (POAG) of right eye, mild stage     Past Surgical History:   Procedure Laterality Date    ANGIOGRAM, CORONARY, WITH LEFT HEART CATHETERIZATION Left 8/21/2023    Procedure: Angiogram, Coronary, with Left Heart Cath;  Surgeon: Kishan De La Rosa MD;  Location: [facility] CATH LAB;  Service: Cardiology;  Laterality: Left;    APPENDECTOMY      blebectomy      COLONOSCOPY  07/25/2014    Dr Yariel Razo    HERNIA REPAIR       Social History     Socioeconomic History    Marital status:    Tobacco Use    Smoking status: Never    Smokeless tobacco: Never   Substance and Sexual Activity    Alcohol use: Not Currently    Drug use: Never        Family History   Problem Relation Name Age of Onset    Hypertension Mother      Heart failure Father      Hypertension Father      Heart failure Sister      Hypertension Sister      Heart attack Sister      Stroke Brother       Current Outpatient Medications:     aspirin (ECOTRIN) 81 MG EC tablet, Take 1 tablet (81 mg total) by mouth once daily., Disp: 90 tablet, Rfl: 3    atorvastatin (LIPITOR) 20 MG tablet, Take 1 tablet (20 mg total) by mouth once daily., Disp: 90 tablet, Rfl: 3    baclofen (LIORESAL) 20 MG tablet, Take 1 tablet every day by oral route at bedtime., Disp: , Rfl:     brinzolamide-brimonidine (SIMBRINZA) 1-0.2 % DrpS, Apply 1 drop to eye 3 (three) times daily., Disp: 8 mL, Rfl: 11    carvediloL (COREG) 12.5 MG tablet, Take 1 tablet (12.5 mg total) by mouth 2 (two) times daily with meals., Disp: 30 tablet, Rfl: 11    docusate sodium (COLACE) 100 MG capsule, 1  capsule as needed., Disp: , Rfl:     DULoxetine (CYMBALTA) 60 MG capsule, Take 60 mg by mouth once daily., Disp: , Rfl:     latanoprost 0.005 % ophthalmic solution, Place 1 drop into both eyes once daily., Disp: 2.5 mL, Rfl: 5    lisinopriL-hydrochlorothiazide (PRINZIDE,ZESTORETIC) 20-12.5 mg per tablet, Take 1 tablet by mouth once daily., Disp: 90 tablet, Rfl: 3    naproxen (NAPROSYN) 500 MG tablet, Take 500 mg by mouth 2 (two) times daily., Disp: , Rfl:     nitroGLYCERIN (NITROSTAT) 0.4 MG SL tablet, Place 1 tablet (0.4 mg total) under the tongue every 5 (five) minutes as needed for Chest pain., Disp: 25 tablet, Rfl: 6    pantoprazole (PROTONIX) 40 MG tablet, Take 40 mg by mouth once daily., Disp: , Rfl:     tamsulosin (FLOMAX) 0.4 mg Cap, Take 1 capsule by mouth once daily., Disp: , Rfl:     traZODone (DESYREL) 50 MG tablet, Take 50 mg by mouth nightly., Disp: , Rfl:     albuterol (PROVENTIL) 2.5 mg /3 mL (0.083 %) nebulizer solution, , Disp: , Rfl:     amoxicillin-clavulanate 875-125mg (AUGMENTIN) 875-125 mg per tablet, Take 1 tablet every 12 hours by oral route. (Patient not taking: Reported on 7/10/2024), Disp: , Rfl:     ANDROGEL 20.25 mg/1.25 gram (1.62 %) GlPm, Apply 2 pumps every day by transdermal route. (Patient not taking: Reported on 5/20/2024), Disp: , Rfl:     azithromycin (Z-NOEL) 250 MG tablet, TAKE 2 TABLETS (500 MG) BY ORAL ROUTE ONCE DAILY FOR 1 DAY THEN 1 TABLET (250 MG) BY ORAL ROUTE ONCE DAILY FOR 4 DAYS (Patient not taking: Reported on 7/10/2024), Disp: , Rfl:     brimonidine 0.15 % OPTH DROP (ALPHAGAN) 0.15 % ophthalmic solution, Place 1 drop into both eyes 3 (three) times daily. (Patient not taking: Reported on 11/15/2024), Disp: 10 mL, Rfl: 5    brinzolamide (AZOPT) 1 % ophthalmic suspension, Place 1 drop into both eyes 3 (three) times daily. (Patient not taking: Reported on 11/15/2024), Disp: 10 mL, Rfl: 5    cetirizine (ZYRTEC) 10 MG tablet, Take 1 tablet by mouth once daily. (Patient  not taking: Reported on 5/20/2024), Disp: , Rfl:     ciprofloxacin HCl (CILOXAN) 0.3 % ophthalmic solution, INSTILL 2 DROPS EVERY 6 HOURS TO (R) EAR. (Patient not taking: Reported on 7/10/2024), Disp: , Rfl:     ciprofloxacin-dexAMETHasone 0.3-0.1% (CIPRODEX) 0.3-0.1 % DrpS, Instill 2 drops every 6 hours by otic route. (Patient not taking: Reported on 7/10/2024), Disp: , Rfl:     diphenoxylate-atropine 2.5-0.025 mg (LOMOTIL) 2.5-0.025 mg per tablet, , Disp: , Rfl:     doxycycline (VIBRA-TABS) 100 MG tablet, Take 100 mg by mouth 2 (two) times daily. (Patient not taking: Reported on 7/10/2024), Disp: , Rfl:     erythromycin (ROMYCIN) ophthalmic ointment, , Disp: , Rfl:     gabapentin (NEURONTIN) 600 MG tablet, Take 600 mg by mouth 3 (three) times daily. (Patient not taking: Reported on 5/20/2024), Disp: , Rfl:     HYDROcodone-acetaminophen (NORCO)  mg per tablet, 1 tablet as needed. (Patient not taking: Reported on 5/20/2024), Disp: , Rfl:     loperamide (IMODIUM) 2 mg capsule, , Disp: , Rfl:     methocarbamoL (ROBAXIN) 750 MG Tab, Take 750 mg by mouth 3 (three) times daily. (Patient not taking: Reported on 5/20/2024), Disp: , Rfl:     methylPREDNISolone (MEDROL DOSEPACK) 4 mg tablet, follow package directions (Patient not taking: Reported on 5/20/2024), Disp: , Rfl:     metoclopramide HCl (REGLAN) 10 MG tablet, , Disp: , Rfl:     montelukast (SINGULAIR) 10 mg tablet, Take 1 tablet by mouth once daily. (Patient not taking: Reported on 5/20/2024), Disp: , Rfl:     predniSONE (DELTASONE) 20 MG tablet, Take 1 tablet every day by oral route for 3 days. (Patient not taking: Reported on 5/20/2024), Disp: , Rfl:     sildenafiL (VIAGRA) 100 MG tablet, TAKE ONE TABLET BY MOUTH ONE HALF HOUR PRIOR TO SEX (Patient not taking: Reported on 5/20/2024), Disp: , Rfl:     Current Facility-Administered Medications:     diazePAM tablet 5 mg, 5 mg, Oral, On Call Procedure, Unruly Garcia, SHAN     ROS:                            "                                                                                                                                                  Review of Systems   Constitutional: Negative.    Eyes:         Visual impairment   Respiratory:  Positive for shortness of breath.    Cardiovascular:  Positive for chest pain and palpitations.   Gastrointestinal: Negative.    Musculoskeletal: Negative.    Skin: Negative.    Neurological: Negative.    Psychiatric/Behavioral: Negative.          Blood pressure 131/71, pulse 74, temperature 98.1 °F (36.7 °C), temperature source Oral, resp. rate 18, height 5' 11" (1.803 m), weight 114.5 kg (252 lb 6.4 oz), SpO2 98%.     PE:  Physical Exam  Constitutional:       Appearance: Normal appearance.   HENT:      Head: Normocephalic.   Eyes:      Extraocular Movements: Extraocular movements intact.   Cardiovascular:      Rate and Rhythm: Normal rate and regular rhythm.   Pulmonary:      Effort: Pulmonary effort is normal.   Abdominal:      Palpations: Abdomen is soft.   Musculoskeletal:         General: Normal range of motion.      Cervical back: Neck supple.   Skin:     General: Skin is warm and dry.   Neurological:      General: No focal deficit present.      Mental Status: He is alert and oriented to person, place, and time.   Psychiatric:         Mood and Affect: Mood normal.       ASSESSMENT/PLAN:  Nonobstructive CAD  - LHC- nonobstructive CAD (prox-mid RCA 50% stenosis, RPDA lesion was 50% stenosed) (8.21.23)  - LHC - nonobstructive CAD with ectatic RCA (3.15.21)  - Abnormal Stress Test- moderate intensity, moderate sized, reversible perfusion abnormality that is consistent with ischemia in the basal to mid inferolateral wall(s) in the typical distribution of the RCA territory.(7.24.23)   - EF-57% per ECHO 12.28.23  - Denies CP. Reports stable QUIROZ  - Continue Aspirin, Carvedilol 6.25 mg BID, Lisinopril/HCTZ 20/12.5 mg, Atorvastatin,and SL Nitro prn CP. Unable to tolerated Imdur " due to dizziness  - Counseled on the importance of consuming a heart healthy diet and exercise as tolerated     QUIROZ -stable   - EF- 57% and mild MR per ECHO 12.28.23  - EF - 50-55% per TTE 4.14.21  - PFTs July 14, 2021 - normal      HTN  - BP at goal 131/71  - Continue Carvedilol 6.25 mg BID, Lisinopril/HCTZ 20/12.5 mg  - Counseled on low salt diet and exercise as tolerated     HLD  - LDL -28-at goal, total chol-78, trig-120   - Decrease Atorvastatin to 10 mg. Continue fish oil   - Counseled on the importance of obtaining a healthy BMI, and consuming a heart healthy, low-cholesterol diet  - Repeat Labs prior to next clinic visit to reassess lipid panel with the decreased dose of statin    GERD  - Management per PCP    ANGELITA/BiPAP  - Patient reports nightly compliance with BiPAP and feels he is benefitting from use   - Recommend continued nightly BiPAP use       Chronic Back Pain  - Continue with pain management as directed    Obesity  - Encouraged weight loss through diet and increased activity as tolerated     Carotid Artery Stenosis   - Carotid US- demonstrated less than 50% stenosis to the right internal carotid artery and the left internal carotid artery demonstrated no hemodynamically significant stenosis (7.16.24)    Peripheral Edema   - Reports intermittent lower extremity edema  - Advised patient on conservative measures including low-sodium diet, leg elevation and compression stockings.    - Will obtain venous reflux studies to assess for any venous insufficiency    EKG  Venous Reflux US of BLE   Decrease Atorvastatin to 10 mg   Follow up in Cardiology Clinic in 6 months with CMP/FLP or sooner if needed    Follow up with PCP as directed  Strict ED precautions

## 2024-11-13 ENCOUNTER — LAB VISIT (OUTPATIENT)
Dept: LAB | Facility: HOSPITAL | Age: 69
End: 2024-11-13
Attending: NURSE PRACTITIONER
Payer: MEDICARE

## 2024-11-13 DIAGNOSIS — E78.5 HYPERLIPIDEMIA LDL GOAL <70: ICD-10-CM

## 2024-11-13 DIAGNOSIS — I10 PRIMARY HYPERTENSION: ICD-10-CM

## 2024-11-13 LAB
ALBUMIN SERPL-MCNC: 3.5 G/DL (ref 3.4–4.8)
ALBUMIN/GLOB SERPL: 1.1 RATIO (ref 1.1–2)
ALP SERPL-CCNC: 55 UNIT/L (ref 40–150)
ALT SERPL-CCNC: 12 UNIT/L (ref 0–55)
ANION GAP SERPL CALC-SCNC: 8 MEQ/L
AST SERPL-CCNC: 20 UNIT/L (ref 5–34)
BILIRUB SERPL-MCNC: 0.8 MG/DL
BUN SERPL-MCNC: 12.9 MG/DL (ref 8.4–25.7)
CALCIUM SERPL-MCNC: 9.2 MG/DL (ref 8.8–10)
CHLORIDE SERPL-SCNC: 106 MMOL/L (ref 98–107)
CHOLEST SERPL-MCNC: 78 MG/DL
CHOLEST/HDLC SERPL: 3 {RATIO} (ref 0–5)
CO2 SERPL-SCNC: 27 MMOL/L (ref 23–31)
CREAT SERPL-MCNC: 1 MG/DL (ref 0.72–1.25)
CREAT/UREA NIT SERPL: 13
GFR SERPLBLD CREATININE-BSD FMLA CKD-EPI: >60 ML/MIN/1.73/M2
GLOBULIN SER-MCNC: 3.2 GM/DL (ref 2.4–3.5)
GLUCOSE SERPL-MCNC: 121 MG/DL (ref 82–115)
HDLC SERPL-MCNC: 26 MG/DL (ref 35–60)
LDLC SERPL CALC-MCNC: 28 MG/DL (ref 50–140)
POTASSIUM SERPL-SCNC: 4.3 MMOL/L (ref 3.5–5.1)
PROT SERPL-MCNC: 6.7 GM/DL (ref 5.8–7.6)
SODIUM SERPL-SCNC: 141 MMOL/L (ref 136–145)
TRIGL SERPL-MCNC: 120 MG/DL (ref 34–140)
VLDLC SERPL CALC-MCNC: 24 MG/DL

## 2024-11-13 PROCEDURE — 36415 COLL VENOUS BLD VENIPUNCTURE: CPT

## 2024-11-13 PROCEDURE — 80061 LIPID PANEL: CPT

## 2024-11-13 PROCEDURE — 80053 COMPREHEN METABOLIC PANEL: CPT

## 2024-11-15 ENCOUNTER — OFFICE VISIT (OUTPATIENT)
Dept: CARDIOLOGY | Facility: CLINIC | Age: 69
End: 2024-11-15
Payer: MEDICARE

## 2024-11-15 VITALS
SYSTOLIC BLOOD PRESSURE: 131 MMHG | RESPIRATION RATE: 18 BRPM | HEIGHT: 71 IN | WEIGHT: 252.38 LBS | TEMPERATURE: 98 F | HEART RATE: 74 BPM | DIASTOLIC BLOOD PRESSURE: 71 MMHG | OXYGEN SATURATION: 98 % | BODY MASS INDEX: 35.33 KG/M2

## 2024-11-15 DIAGNOSIS — I25.118 CORONARY ARTERY DISEASE OF NATIVE ARTERY OF NATIVE HEART WITH STABLE ANGINA PECTORIS: ICD-10-CM

## 2024-11-15 DIAGNOSIS — E78.5 HYPERLIPIDEMIA LDL GOAL <70: ICD-10-CM

## 2024-11-15 DIAGNOSIS — I25.10 CORONARY ARTERY DISEASE INVOLVING NATIVE CORONARY ARTERY OF NATIVE HEART WITHOUT ANGINA PECTORIS: ICD-10-CM

## 2024-11-15 DIAGNOSIS — G47.33 OSA TREATED WITH BIPAP: ICD-10-CM

## 2024-11-15 DIAGNOSIS — I10 PRIMARY HYPERTENSION: Primary | ICD-10-CM

## 2024-11-15 DIAGNOSIS — R06.09 DOE (DYSPNEA ON EXERTION): ICD-10-CM

## 2024-11-15 DIAGNOSIS — R60.0 PERIPHERAL EDEMA: ICD-10-CM

## 2024-11-15 PROBLEM — R07.89 OTHER CHEST PAIN: Status: RESOLVED | Noted: 2022-07-15 | Resolved: 2024-11-15

## 2024-11-15 LAB
OHS QRS DURATION: 88 MS
OHS QTC CALCULATION: 423 MS

## 2024-11-15 PROCEDURE — 99215 OFFICE O/P EST HI 40 MIN: CPT | Mod: PBBFAC,25 | Performed by: NURSE PRACTITIONER

## 2024-11-15 PROCEDURE — 93005 ELECTROCARDIOGRAM TRACING: CPT

## 2024-11-15 RX ORDER — ATORVASTATIN CALCIUM 20 MG/1
20 TABLET, FILM COATED ORAL DAILY
Qty: 90 TABLET | Refills: 3 | Status: SHIPPED | OUTPATIENT
Start: 2024-11-15 | End: 2024-11-15 | Stop reason: SDUPTHER

## 2024-11-15 RX ORDER — ATORVASTATIN CALCIUM 10 MG/1
10 TABLET, FILM COATED ORAL DAILY
Qty: 90 TABLET | Refills: 3 | Status: SHIPPED | OUTPATIENT
Start: 2024-11-15 | End: 2025-11-15

## 2024-11-15 NOTE — PATIENT INSTRUCTIONS
Venous Reflux US of BLE   Decrease Atorvastatin to 10 mg   Follow up in Cardiology Clinic in 6 months with CMP/FLP or sooner if needed    Follow up with PCP as directed  Strict ED precautions

## 2024-12-03 ENCOUNTER — OFFICE VISIT (OUTPATIENT)
Dept: OPHTHALMOLOGY | Facility: CLINIC | Age: 69
End: 2024-12-03
Payer: MEDICARE

## 2024-12-03 VITALS — HEIGHT: 71 IN | BODY MASS INDEX: 35.34 KG/M2 | WEIGHT: 252.44 LBS

## 2024-12-03 DIAGNOSIS — H47.20 OPTIC ATROPHY OF LEFT EYE: ICD-10-CM

## 2024-12-03 DIAGNOSIS — H04.123 DRY EYE SYNDROME OF BOTH EYES: ICD-10-CM

## 2024-12-03 DIAGNOSIS — H40.1123 PRIMARY OPEN ANGLE GLAUCOMA (POAG) OF LEFT EYE, SEVERE STAGE: ICD-10-CM

## 2024-12-03 DIAGNOSIS — H33.321 RETINAL HOLE OF RIGHT EYE: ICD-10-CM

## 2024-12-03 DIAGNOSIS — H40.1111 PRIMARY OPEN ANGLE GLAUCOMA (POAG) OF RIGHT EYE, MILD STAGE: Primary | ICD-10-CM

## 2024-12-03 PROCEDURE — 99214 OFFICE O/P EST MOD 30 MIN: CPT | Mod: PBBFAC,PN | Performed by: STUDENT IN AN ORGANIZED HEALTH CARE EDUCATION/TRAINING PROGRAM

## 2024-12-03 NOTE — PROGRESS NOTES
\[facility]\""    RTC 3 months IOP/lid check.  Patient is still having trouble with his vision coming in and out even   with the eye drops he is using, some of his scratchy eyes have improved   with the medication, he states that his eyeglasses are not working for him   he can not see far or up close with them only at arms length   Last edited by Fozia Mohamud MA on 12/3/2024  9:43 AM.          Assessment /Plan     For exam results, see Encounter Report.    Primary open angle glaucoma (POAG) of right eye, mild stage    Primary open angle glaucoma (POAG) of left eye, severe stage    Dry eye syndrome of both eyes    Optic atrophy of left eye    Retinal hole of right eye        Testing  Tmax 28//38  //565    OCT RNFL  3/2017: 77//55 ( S, I red, T borderline, N green)  1/2019 OD: 81 yellow S, otherwise green // unable OS  12/22/20  OD - 80, yellow S, remainder green  OS - poor quality, red S  7/11/2023: 84//15, all green//poor study  10/2023: all green // all red  4/11/24:  77, Red S, Rest green (4/10 SS)  7/19//24: 79, red S, rest green (shifted superior peak)    Gonio  9/29/2020  OD: Open to   OS: Open to SS/  12/2014 Open OU    HVF 24-2  2/18/15 -- Full OD/IAD OS  3/2017 Full OD/ IAD +progression OS  5/13/19 OD only: full and reliable  9/29/2020  OD: 1/10 losses, reliable, early superior arcuate defect  OS: 0/11 losses, total defect  12/23/21  OD: few scattered superior defects, grossly full   OS: generalized depression  1/11/24  OD: FL 0/11, FP 0%, FN 0%, nonspecific scattered defects, essentially full  OS: unable     B-scan OS 7/24/20 - flat and attached, no tears/detachments appreciated     1. Severe POAG OS  2. s/p CEIOL + AGV OS 10/29/2020  - s/p trab with multiple revisions, Due to persistent bleb leak with resultant hypotony with maculopathy s/p trab (11/18/17), trab revision x4 (2/21/18, 2/26/18, 4/3/18, 5/29/18)  - Tmax 38  - //565  - Timolol was discontinued as patient reports it gave him  arrhythmias, so will attempt trial off Timolol  - Continue Simbrinza TID, Latanoprost QHS  - 10/23: IOP OK at 18, not using massage, pressure is adequate given poor vision potential  - 1/11/24: IOP stable at 18//14, using ocular massage sometimes; HVF full OD // unable OS; continue current regimen  - 4/11/24: IOP 16//13, stable. Pt using ocular massage 0-1x/day  - 8/2/24: IOP 14, no pain. Continue drops.  - 12/3/24: IOP 13, stable. Continue current drops.     3. Open angle with borderline findings, high risk OD  - IOP stable; in fact likely not glaucomatous due to normal CD and IOP, although high risk  - Tmax 28   - Goal ~19  - Current drops: Simbrinza TID, Latanoprost QHS  - Will hold off on SLT today given good IOP and stable OCT RNFL over last 8 years.   - 12/3/24: IOP today 15, continue current management   - low concern for glaucoma at this time but will continue to treat as precautionary measure    4. Ptosis OS  - Pt states that he only has noticed it since the surgery  - MRD1 0.5 OS  - doesn't bother pt    5. S/p CEIOL OD  - 7/26/23 yag cap, BCVA 20/20    6. Dry Eye Syndrome vs Glaucoma Medicamentosa OS>OD  - PFATs 6 times a day, WC/LS. Start erythromycin jean OD (ok to also use OU) for dense confluent PEEs   - Excess glaucoma drops in right eye may be contributing factor  - Discussed punctal plugs vs restasis, pt interested in restasis if possible (has never tried). Told to increase PFATs to at least 6 times daily and continue jean.   - could trial SLT OD to lower IOP and reduce drop burden  - could switch to preservative free glaucoma drops  - 8/28/24: Not using WC or lid scrubs. Discussed proper technique. Recommend Sy mask and avenova scrubs along with PFAT 4-6x/day. If not improved with compliance, could try doxycycline.    7. Optic atrophy OS  - May be secondary to the hypotony in 2017/2018    8. PVD OD  9. Atrophic holes, OD  - 4/11/24: Pt reports increase in floaters over last few mo. No  flashes/curtains   - DFE 4/11/24 w/o h/t/d  - Monocular precautions   - 7/19/24: Seen with Dr Wilhelm - holes small with pigmentary changes surrounding. Would hold off on treatment at this time given location, unlikely to progress given small size and pigmentary changes.  - 12/3/24: Patient with 1 week history of new floaters. Large PVD noted OD with no new changes in peripheral examination. [facility] (does patient need barrier laser?)    RTC 3-4 weeks [facility] DFE

## 2024-12-16 ENCOUNTER — HOSPITAL ENCOUNTER (OUTPATIENT)
Dept: RADIOLOGY | Facility: HOSPITAL | Age: 69
Discharge: HOME | End: 2024-12-16
Attending: NURSE PRACTITIONER
Payer: MEDICARE

## 2024-12-16 DIAGNOSIS — R60.0 PERIPHERAL EDEMA: ICD-10-CM

## 2024-12-16 LAB
LEFT GREAT SAPHENOUS DISTAL THIGH DIA: 0.45 CM
LEFT GREAT SAPHENOUS JUNCTION DIA: 0.88 CM
LEFT GREAT SAPHENOUS KNEE DIA: 0.4 CM
LEFT GREAT SAPHENOUS KNEE REFLUX: 1464 MS
LEFT GREAT SAPHENOUS MIDDLE THIGH DIA: 0.51 CM
LEFT GREAT SAPHENOUS PROXIMAL CALF DIA: 0.27 CM
LEFT SMALL SAPHENOUS KNEE DIA: 0.47 CM
LEFT SMALL SAPHENOUS SPJ DIA: 0.32 CM
RIGHT GREAT SAPHENOUS DISTAL THIGH DIA: 0.35 CM
RIGHT GREAT SAPHENOUS JUNCTION DIA: 1.07 CM
RIGHT GREAT SAPHENOUS KNEE DIA: 0.57 CM
RIGHT GREAT SAPHENOUS MIDDLE THIGH DIA: 0.52 CM
RIGHT GREAT SAPHENOUS PROXIMAL CALF DIA: 0.44 CM
RIGHT SMALL SAPHENOUS KNEE DIA: 0.34 CM
RIGHT SMALL SAPHENOUS SPJ DIA: 0.28 CM

## 2024-12-16 PROCEDURE — 93970 EXTREMITY STUDY: CPT | Mod: TC

## 2024-12-17 ENCOUNTER — TELEPHONE (OUTPATIENT)
Dept: CARDIOLOGY | Facility: CLINIC | Age: 69
End: 2024-12-17
Payer: MEDICARE

## 2024-12-17 NOTE — TELEPHONE ENCOUNTER
Discussed results of bilateral venous reflux ultrasounds with the patient.  Discussed conservative therapies with the patient's such as low-sodium diet, leg elevation and compression stockings.  The patient is agreeable to continue with conservative therapies and to notify clinic of any increased peripheral edema.  All questions answered.  Patient verbalizes understanding.

## 2024-12-20 ENCOUNTER — OFFICE VISIT (OUTPATIENT)
Dept: OPHTHALMOLOGY | Facility: CLINIC | Age: 69
End: 2024-12-20
Payer: MEDICARE

## 2024-12-20 VITALS — BODY MASS INDEX: 35.34 KG/M2 | HEIGHT: 71 IN | WEIGHT: 252.44 LBS

## 2024-12-20 DIAGNOSIS — H33.321 RETINAL HOLE OF RIGHT EYE: Primary | ICD-10-CM

## 2024-12-20 PROCEDURE — 99214 OFFICE O/P EST MOD 30 MIN: CPT | Mod: PBBFAC,PN

## 2024-12-20 RX ORDER — PHENYLEPH/TROPICAMIDE IN WATER 2.5 %-1 %
1 DROPS OPHTHALMIC (EYE) ONCE
Status: DISCONTINUED | OUTPATIENT
Start: 2024-12-20 | End: 2024-12-20

## 2024-12-20 RX ORDER — PHENYLEPH/TROPICAMIDE IN WATER 2.5 %-1 %
1 DROPS OPHTHALMIC (EYE) ONCE
Status: COMPLETED | OUTPATIENT
Start: 2024-12-20 | End: 2024-12-20

## 2024-12-20 RX ADMIN — Medication 1 DROP: at 11:12

## 2024-12-20 NOTE — PROGRESS NOTES
HPI    RTC 3-4 weeks [facility] DFE  Last edited by Fozia Mohamud MA on 12/20/2024 11:29 AM.          HPI    RTC 3-4 weeks [facility] D[facility]  Last edited by Fozia Mohamud MA on 12/20/2024 11:29 AM.          Assessment /Plan     For exam results, see Encounter Report.    Retinal hole of right eye  -     tropicamide /PHENYLephrine opthalmic solution 1 drop  -     Discontinue: tropicamide /PHENYLephrine opthalmic solution 1 drop        Testing  Tmax 28//38  //565    OCT RNFL  3/2017: 77//55 ( S, I red, T borderline, N green)  1/2019 OD: 81 yellow S, otherwise green // unable OS  12/22/20  OD - 80, yellow S, remainder green  OS - poor quality, red S  7/11/2023: 84//15, all green//poor study  10/2023: all green // all red  4/11/24:  77, Red S, Rest green (4/10 SS)  7/19//24: 79, red S, rest green (shifted superior peak)    [facility]  9/29/2020  OD: Open to   OS: Open to SS/  12/2014 Open OU    HVF 24-2  2/18/15 -- Full OD/IAD OS  3/2017 Full OD/ IAD +progression OS  5/13/19 OD only: full and reliable  9/29/2020  OD: 1/10 losses, reliable, early superior arcuate defect  OS: 0/11 losses, total defect  12/23/21  OD: few scattered superior defects, grossly full   OS: generalized depression  1/11/24  OD: FL 0/11, FP 0%, FN 0%, nonspecific scattered defects, essentially full  OS: unable     B-scan OS 7/24/20 - flat and attached, no tears/detachments appreciated     1. PVD OD  2. Atrophic holes, OD  - 4/11/24: Pt reports increase in floaters over last few mo. No flashes/curtains   - DFE 4/11/24 w/o h/t/d  - Monocular precautions   - 7/19/24: Seen with Dr Wilhelm - holes small with pigmentary changes surrounding. Would hold off on treatment at this time given location, unlikely to progress given small size and pigmentary changes.  - 12/3/24: Patient with 1 week history of new floaters. Large PVD noted OD with no new changes in peripheral examination. Will bring back to [facility] day for evaluation (does patient need barrier  laser?)  -12/20/24: pt reports improving floaters in the right eye however, continues to experience occasional floaters. With essential monocular status of the right eye as seeing eye, with persistent floaters and low risk profile of peripheral laser, will schedule patient at next available procedure day for laser of right eye peripheral atrophic holes. Discussed and seen with Dr. Wilhelm, given peripheral location and pigmentary changes surrounding, continues to be low risk for detachment, however, after discussion with pt, will move forward for laser retinopexy surrounding holes.     RTC next available procedure day for DFE, laser retinopexy right eye atrophic holes temporally     ====================================================================  Not directly addressed today   1. Severe POAG OS  2. s/p CEIOL + AGV OS 10/29/2020  - s/p trab with multiple revisions, Due to persistent bleb leak with resultant hypotony with maculopathy s/p trab (11/18/17), trab revision x4 (2/21/18, 2/26/18, 4/3/18, 5/29/18)  - Tmax 38  - //565  - Timolol was discontinued as patient reports it gave him arrhythmias, so will attempt trial off Timolol  - Continue Simbrinza TID, Latanoprost QHS  - 10/23: IOP OK at 18, not using massage, pressure is adequate given poor vision potential  - 1/11/24: IOP stable at 18//14, using ocular massage sometimes; HVF full OD // unable OS; continue current regimen  - 4/11/24: IOP 16//13, stable. Pt using ocular massage 0-1x/day  - 8/2/24: IOP 14, no pain. Continue drops.  - 12/3/24: IOP 13, stable. Continue current drops.  - 12/20/24: pt with bilateral injection and tr follicular reaction OU in palpebral conj, discussed with patient that this could be 2/2 pressure drops causing irritation. Consider changing drops at next general appointment due to possible irritation from drops      3. Open angle with borderline findings, high risk OD  - IOP stable; in fact likely not glaucomatous due to normal  CD and IOP, although high risk  - Tmax 28   - Goal ~19  - Current drops: Simbrinza TID, Latanoprost QHS  - Will hold off on SLT today given good IOP and stable OCT RNFL over last 8 years.   - 12/3/24: IOP today 15, continue current management   - low concern for glaucoma at this time but will continue to treat as precautionary measure    4. Ptosis OS  - Pt states that he only has noticed it since the surgery  - MRD1 0.5 OS  - doesn't bother pt    5. S/p CEIOL OD  - 7/26/23 yag cap, BCVA 20/20    6. Dry Eye Syndrome vs Glaucoma Medicamentosa OS>OD  - PFATs 6 times a day, WC/LS. Start erythromycin jean OD (ok to also use OU) for dense confluent PEEs   - Excess glaucoma drops in right eye may be contributing factor  - Discussed punctal plugs vs restasis, pt interested in restasis if possible (has never tried). Told to increase PFATs to at least 6 times daily and continue jean.   - could trial SLT OD to lower IOP and reduce drop burden  - could switch to preservative free glaucoma drops  - 8/28/24: Not using WC or lid scrubs. Discussed proper technique. Recommend Sy mask and avenova scrubs along with PFAT 4-6x/day. If not improved with compliance, could try doxycycline.    7. Optic atrophy OS  - May be secondary to the hypotony in 2017/2018

## 2025-01-09 DIAGNOSIS — G56.03 CARPAL TUNNEL SYNDROME, BILATERAL UPPER LIMBS: Primary | ICD-10-CM

## 2025-01-29 ENCOUNTER — CLINICAL SUPPORT (OUTPATIENT)
Dept: OPHTHALMOLOGY | Facility: CLINIC | Age: 70
End: 2025-01-29
Payer: MEDICARE

## 2025-01-29 VITALS — BODY MASS INDEX: 35.34 KG/M2 | WEIGHT: 252.44 LBS | HEIGHT: 71 IN

## 2025-01-29 DIAGNOSIS — H40.1111 PRIMARY OPEN ANGLE GLAUCOMA (POAG) OF RIGHT EYE, MILD STAGE: ICD-10-CM

## 2025-01-29 DIAGNOSIS — H33.321 RETINAL HOLE OF RIGHT EYE: Primary | ICD-10-CM

## 2025-01-29 DIAGNOSIS — H40.1123 PRIMARY OPEN ANGLE GLAUCOMA (POAG) OF LEFT EYE, SEVERE STAGE: ICD-10-CM

## 2025-01-29 PROCEDURE — 67145 PROPH RTA DTCHMNT PC: CPT | Mod: PBBFAC,PN,RT,GC

## 2025-01-29 PROCEDURE — 99214 OFFICE O/P EST MOD 30 MIN: CPT | Mod: PBBFAC,PN

## 2025-01-29 NOTE — PROGRESS NOTES
[facility]    RTC next available procedure day for DFE, laser retinopexy right eye   atrophic holes temporally   Patient did not bring his glasses with him today and notices a difference   in his vision (it is a little better in his left eye and he knows that his   vision will not be back to normal but it is improved)   Last edited by Fozia Mohamud MA on 1/29/2025  9:38 AM.            Assessment /Plan     For exam results, see Encounter Report.    There are no diagnoses linked to this encounter.      Testing  Tmax 28//38  //565    OCT RNFL  3/2017: 77//55 ( S, I red, T borderline, N green)  1/2019 OD: 81 yellow S, otherwise green // unable OS  12/22/20  OD - 80, yellow S, remainder green  OS - poor quality, red S  7/11/2023: 84//15, all green//poor study  10/2023: all green // all red  4/11/24:  77, Red S, Rest green (4/10 SS)  7/19//24: 79, red S, rest green (shifted superior peak)    [facility]  9/29/2020  OD: Open to   OS: Open to SS/  12/2014 Open OU    HVF 24-2  2/18/15 -- Full OD/IAD OS  3/2017 Full OD/ IAD +progression OS  5/13/19 OD only: full and reliable  9/29/2020  OD: 1/10 losses, reliable, early superior arcuate defect  OS: 0/11 losses, total defect  12/23/21  OD: few scattered superior defects, grossly full   OS: generalized depression  1/11/24  OD: FL 0/11, FP 0%, FN 0%, nonspecific scattered defects, essentially full  OS: unable     B-scan OS 7/24/20 - flat and attached, no tears/detachments appreciated     1. PVD OD  2. Atrophic holes, OD  - 4/11/24: Pt reports increase in floaters over last few mo. No flashes/curtains   - DFE 4/11/24 w/o h/t/d  - Monocular precautions   - 7/19/24: Seen with Dr Wilhelm - holes small with pigmentary changes surrounding. Would hold off on treatment at this time given location, unlikely to progress given small size and pigmentary changes.  - 12/3/24: Patient with 1 week history of new floaters. Large PVD noted OD with no new changes in peripheral examination. Will  bring back to [facility] day for evaluation (does patient need barrier laser?)  -12/20/24: pt reports improving floaters in the right eye however, continues to experience occasional floaters. With essential monocular status of the right eye as seeing eye, with persistent floaters and low risk profile of peripheral laser, will schedule patient at next available procedure day for laser of right eye peripheral atrophic holes. Discussed and seen with Dr. Wilhelm, given peripheral location and pigmentary changes surrounding, continues to be low risk for detachment, however, after discussion with pt, will move forward for laser retinopexy surrounding holes.   - 01/29/2025: presents for laser retinopexy right eye. 3 pigmented atrophic holes seen in far periphery, prior fundus photo reviewed with Dr. Hager. Discussed r/b/a, patient consented for retinopexy right eye. Patient tolerated procedure today with no complications.     RTC six weeks for repeat DFE, fundus photo  [facility] 02/28/2025 for repeat IOP check, gonioscopy.     ====================================================================  Not directly addressed today   1. Severe POAG OS  2. s/p CEIOL + AGV OS 10/29/2020  - s/p trab with multiple revisions, Due to persistent bleb leak with resultant hypotony with maculopathy s/p trab (11/18/17), trab revision x4 (2/21/18, 2/26/18, 4/3/18, 5/29/18)  - Tmax 38  - //565  - Timolol was discontinued as patient reports it gave him arrhythmias, so will attempt trial off Timolol  - Continue Simbrinza TID, Latanoprost QHS  - 10/23: IOP OK at 18, not using massage, pressure is adequate given poor vision potential  - 1/11/24: IOP stable at 18//14, using ocular massage sometimes; HVF full OD // unable OS; continue current regimen  - 4/11/24: IOP 16//13, stable. Pt using ocular massage 0-1x/day  - 8/2/24: IOP 14, no pain. Continue drops.  - 12/3/24: IOP 13, stable. Continue current drops.  - 12/20/24: pt with bilateral  injection and tr follicular reaction OU in palpebral conj, discussed with patient that this could be 2/2 pressure drops causing irritation. Consider changing drops at next general appointment due to possible irritation from drops    - 01/29/2025: patient with increased IOP after dilation on applanation and tonopen. Repeat gonioscopy at subsequent visit when undilated. Will have patient follow up in [facility] 02/28/2025 given severe glaucoma and on near max drops (cannot tolerate timolol, can start rocklatan?). Has had follicular reaction in the past, but given pressure likely cannot take off brimonidine.     3. Open angle with borderline findings, high risk OD  - IOP stable; in fact likely not glaucomatous due to normal CD and IOP, although high risk  - Tmax 28   - Goal ~19  - Current drops: Simbrinza TID, Latanoprost QHS  - Will hold off on SLT today given good IOP and stable OCT RNFL over last 8 years.   - 12/3/24: IOP today 15, continue current management   - low concern for glaucoma at this time but will continue to treat as precautionary measure    4. Ptosis OS  - Pt states that he only has noticed it since the surgery  - MRD1 0.5 OS  - doesn't bother pt    5. S/p CEIOL OD  - 7/26/23 yag cap, BCVA 20/20    6. Dry Eye Syndrome vs Glaucoma Medicamentosa OS>OD  - PFATs 6 times a day, WC/LS. Start erythromycin jean OD (ok to also use OU) for dense confluent PEEs   - Excess glaucoma drops in right eye may be contributing factor  - Discussed punctal plugs vs restasis, pt interested in restasis if possible (has never tried). Told to increase PFATs to at least 6 times daily and continue jean.   - could trial SLT OD to lower IOP and reduce drop burden  - could switch to preservative free glaucoma drops  - 8/28/24: Not using WC or lid scrubs. Discussed proper technique. Recommend Sy mask and avenova scrubs along with PFAT 4-6x/day. If not improved with compliance, could try doxycycline.    7. Optic atrophy OS  -  May be secondary to the hypotony in 2017/2018        Procedure note: laser retinopexy right eye  Diagnosis: atrophic retinal holes, symptomatic.   Surgeon: Dr. Galicia  Attending: Dr. Hager    Risks, benefits, alternatives to procedure were discussed. Written consent was obtained and scanned into chart. A timeout was performed. Topical proparacaine was instilled into the right eye. The eye was dilated prior to procedure with phenylephrine and tropicamide. Three pigmented atrophic holes were identified in the far periphery at 8:30. One additional pigmented spot was seen in the mid-periphery. View was limited due to patient cooperation. Laser shots were placed posterior to the leading edge of these holes.  A pan-retinal lens was used with an indirect laser headset for the procedure.   Laser shots: 33. Duration 100ms. Power 250. Interval 330. Variable spot size with KEVIN.   The patient tolerated the procedure will with no complications. Will have patient return in six weeks or sooner PRN. Return precautions given.     RTC six weeks for repeat fundus photo, DFE, possible repeat retinopexy right eye  [facility] given elevated pressure on near max drops (although pressure was normal prior to dilation). Recommend repeat gonioscopy, IOP check.       RTC six weeks for repeat DFE, fundus photo  [facility], gonioscopy.

## 2025-01-30 ENCOUNTER — TELEPHONE (OUTPATIENT)
Dept: OPHTHALMOLOGY | Facility: CLINIC | Age: 70
End: 2025-01-30
Payer: MEDICARE

## 2025-02-27 ENCOUNTER — OFFICE VISIT (OUTPATIENT)
Dept: ORTHOPEDICS | Facility: CLINIC | Age: 70
End: 2025-02-27
Payer: MEDICARE

## 2025-02-27 ENCOUNTER — HOSPITAL ENCOUNTER (OUTPATIENT)
Dept: RADIOLOGY | Facility: HOSPITAL | Age: 70
Discharge: HOME | End: 2025-02-27
Attending: NURSE PRACTITIONER
Payer: MEDICARE

## 2025-02-27 VITALS
WEIGHT: 259.06 LBS | OXYGEN SATURATION: 97 % | SYSTOLIC BLOOD PRESSURE: 136 MMHG | BODY MASS INDEX: 36.27 KG/M2 | HEIGHT: 71 IN | HEART RATE: 67 BPM | DIASTOLIC BLOOD PRESSURE: 81 MMHG

## 2025-02-27 DIAGNOSIS — G56.03 CARPAL TUNNEL SYNDROME, BILATERAL UPPER LIMBS: Primary | ICD-10-CM

## 2025-02-27 DIAGNOSIS — M79.642 PAIN IN BOTH HANDS: ICD-10-CM

## 2025-02-27 DIAGNOSIS — M79.641 PAIN IN BOTH HANDS: ICD-10-CM

## 2025-02-27 PROCEDURE — 99215 OFFICE O/P EST HI 40 MIN: CPT | Mod: PBBFAC,25 | Performed by: NURSE PRACTITIONER

## 2025-02-27 PROCEDURE — 73130 X-RAY EXAM OF HAND: CPT | Mod: TC,RT

## 2025-02-27 PROCEDURE — 73130 X-RAY EXAM OF HAND: CPT | Mod: TC,LT

## 2025-02-27 PROCEDURE — 20526 THER INJECTION CARP TUNNEL: CPT | Mod: 50,PBBFAC | Performed by: NURSE PRACTITIONER

## 2025-02-27 RX ORDER — TRIAMCINOLONE ACETONIDE 40 MG/ML
40 INJECTION, SUSPENSION INTRA-ARTICULAR; INTRAMUSCULAR
Status: COMPLETED | OUTPATIENT
Start: 2025-02-27 | End: 2025-02-27

## 2025-02-27 RX ADMIN — TRIAMCINOLONE ACETONIDE 40 MG: 40 INJECTION, SUSPENSION INTRA-ARTICULAR; INTRAMUSCULAR at 08:02

## 2025-02-27 NOTE — PROGRESS NOTES
"[facility]  Subjective:   PATIENT ID: Doug Faye is a 69 y.o. male.   CHIEF COMPLAINT: Pain of the Left Hand ( Bilat Hand Xray SR Carpal tunnel syndrome, bilateral.Pt stated pain is above 10.//) and Pain of the Right Hand    HPI:  Bilateral L < R numbness, tingling in palm and of first 1-3 digits. Right dominant   Injury/ Surgical HX r/t CC:  no HX of prior significant joint injury   Onset: Several years  Worsening over time   Modifying Factors: exacerbated by:  increased activity, lifting objects > 5 pounds, gripping improved with:  rest   Associated Symptoms:  [] Weakness w/ dropping items  [x] Weakness w/o dropping items  [x] Difficulty sleeping s/t symptoms [x] Radiating into forearm  [] Radiating into neck  [] Joint stiffness        Activity:  sedentary with light activity and pain moderately interferes with ADLs    Previous Treatments:  [x] HEP > 6 weeks  [x] Nocturnal wrist splint  [] RX OT  [x] OTC Pain Relievers: Tylenol, ibuprofen  [x] RX Medications: naproxen, Tramadol, Norco, gabapentin, methocarbamol, baclofen  [x] CSI since 2018, last injection 4/10/2018   PMH:  CAD, HTN   Family History: + OA   NOTE: Existing patient, new to me, last seen by [facility] on 2017  referred for bilateral hand numbness  with noted history of some conservative treatments.  Symptoms affecting ADLs.   Employment HX: , currently retired.    History of abnormal EMG study from 2017.   Current Medications[1]  Review of patient's allergies indicates:   Allergen Reactions    Penicillins      Other reaction(s): Passed out     REVIEW OF SYSTEMS:  A ten-point review of systems was performed and is negative, except as mentioned above  Objective:   Body mass index is 36.13 kg/m².   Vitals:    02/27/25 0850   BP: 136/81   Pulse: 67   SpO2: 97%   Weight: 117.5 kg (259 lb 0.7 oz)   Height: 5' 11" (1.803 m)   PainSc: 10-Worst pain ever   PainLoc: Hand      MSK Hand/ Wrist " Exam  General:  no apparent distress, no pain indicators,  obesity  Inspection:  LEFT HAND RIGHT HAND   no joint swelling, no mass/ cyst noted, no soft tissue swelling, no erythema, no lesions, no contusion, no gross deformities, no nodules, no atrophy of thenar or hypothenar eminence,  no scars, no discoloration noted no joint swelling, no mass/ cyst noted, no soft tissue swelling, no erythema, no lesions, no contusion, no gross deformities, no nodules, no atrophy of thenar or hypothenar eminence,  no scars, no discoloration noted   Palpation:     LEFT HAND RIGHT HAND   no joint tenderness, normal temperature, no palpable palm nodules, no finger joint tenderness or crepitus, no active triggering or locking of digits and no tenderness of digital flexor tendons, no tenderness of thenar and hypothenar eminence no joint tenderness, normal temperature, no palpable palm nodules, no finger joint tenderness or crepitus, no active triggering or locking of digits and no tenderness of digital flexor tendons, no tenderness of thenar and hypothenar eminence     ROM LEFT HAND RIGHT HAND   Wrist Flexion / Extension   80 / 75 80 / 75   Wrist Radial / Ulnar Deviation   15 / 30 15 / 30   Thumb CMC/ MCP/ IP WNL w/o pain WNL w/o pain   Finger MCP/PIP/DIP WNL w/o pain WNL w/o pain      Strength LEFT HAND RIGHT HAND    4 / 5 w/o pain 4 / 5 w/o pain       Special Testing: L+ L-- R+ R-- Not Tested     Carpal Tunnel Syndrome         Phalen [x] [] [x] [] []    Maximino Carpal Compression [x] [] [x] [] []    Cubital Tunnel Syndrome         Tinel's Test @ elbow [] [x] [] [x] []    De Quervain's Tenosynovitis         Finkelstein Test [] [x] [] [x] []    Ligament Injury         Scaphoid Shift Test [] [] [] [] [x]    Lunotriquetral Shuck Test [] [] [] [] [x]    UCL Ligament Thumb Test [] [] [] [] [x]    Ulnar Fovea Sign [] [] [] [] [x]    Nerve Injury  Radial Nerve  IP joint extension against resistance intact   Median Nerve Palmar abduction  "of thumb intact   Anterior Interosseous Branch OK sign intact   Ulnar Nerve   Abduction of fingers against resistance intact, Froment's sign negative   Neuro/ Vascular: Spurling's Test negative, Intact to light touch, capillary refill 2 seconds,  radial pulse equal 2+, 2 point discrimination intact, Kingston's test intact  Psych: Awake, alert, oriented, normal mood and affect  Lymphatic: No LAD  Skin/ Soft Tissue: no rash, skin intact  Cardio: no edema, vascular integrity noted  Resp: no increased respiratory effort noted   Assessment:   IMAGING: XR Ordered by me today 3 views of bilateral hand reviewed and independently interpreted by me with no acute findings noted.  Awaiting radiologist findings.  Findings discussed with patient today.    Labs:  No results found for: "HGBA1C"   EMG Study: completed approximately 1 yr ago, patient reports results were b/l carpal tunnel.  No EMG in referral.  Will contact PCP office to have EMG results faxed to our office. Last documented EMG in EMR noted from 2017 Right carpal tunnel only.  After visit completed the following EMG results received from PCP office dated 5/1/24 per Dr. Wagner:  Abnormal electrodiagnostic study of bilateral upper extremities consistent with sensory motor polyneuropathy.  Chronic left C7-C6 C5 and equivocal C8 radiculopathies.  Chronic right C5-C6 C7 radiculopathies.  Moderate to severe left median neuropathy at the wrist, carpal tunnel syndrome.  Mild to moderate left ulnar neuropathy at the wrist.  Moderate left ulnar neuropathy at the wrist.  Moderate right median neuropathy at the wrist, carpal tunnel syndrome.  Moderate right ulnar neuropathy at the wrist.  Mild to moderate right ulnar neuropathy at the elbow.  EMR REVIEW: completed with noted Referral documentation reviewed  DX & Plan:   DIAGNOSIS: Moderate exacerbation of chronic Bilateral R = L Carpal tunnel syndrome complicated by shoulder pain    1. Carpal tunnel syndrome, bilateral upper " limbs    2. BMI 36.0-36.9,adult       TREATMENT PLAN:  Orders Placed This Encounter    Carpal Tunnel    X-Ray Hand Complete Right    X-Ray Hand Complete Left    triamcinolone acetonide injection 40 mg    triamcinolone acetonide injection 40 mg     Treatment Plan: EMG study received and recommend CSI today for symptom relief.  If inadequate relief at f/u will discuss referral to ortho res.    Ongoing education about DX and treatment recommendations including conservative treatments of daily HEP for carpal tunnel, nocturnal splinting of wrist PRN and OTC NSAID as needed according to label instructions if able to tolerate.   Procedure: CSI discussed, s/t failed conservative treatments patient consents to CSI today  RX Medications: continue medications as RX per PCP .     RTC: telemedicine visit April 2025     Iraida HERNANDEZP [facility]  Procedure Note:   Carpal Tunnel: L carpal tunnel, R carpal tunnel    Date/Time: 2/27/2025 8:40 AM    Performed by: Iraida Rajput NP  Authorized by: Iraida Rajput NP    Indications:  Pain  Location:  Wrist  Site:  L carpal tunnel and R carpal tunnel  Medications:  40 mg KENALOG 40 mg / 1 mL; 40 mg KENALOG 40 mg / 1 mL  ORTHO Procedure Note CSI Bilateral  Carpal Tunnel  Indications: Therapeutic Indication - Decrease pain, Increase range of motion and improve quality of life.  Risks:  Possible complications with the injection include bleeding, infection (.01%), tendon rupture, steroid flare, fat pad or soft tissue atrophy, skin depigmentation, allergic reaction to medications and vasovagal response. (steroid flare treatment is rest, ice, NSAIDs and resolves in 24-36 hours)  Consent:  Procedure, risks, benefits, and alternatives explained the patient, who voiced understanding and agreed to proceed with procedure.  Consent signed and scanned into the medical record.   No absolute contraindications (cellulitis overlying joint,  infection, lack of informed consent, allergy to injection medication, or history of steroid flare) or relative contraindications (uncontrolled DM2 A1c>10, coagulopathy, INR > 3.5, previous joint replacement or history of AVN).        Staff: Iraida TORREZ  Description:  Time-out performed.  The patient was prepped in normal sterile fashion use of chlorhexidine scrub and the appropriate and anatomic landmarks were identified.  Carpal tunnel injection was performed just ulnar to the palmaris longus tendon at the area of the distal palmar crease.  Sterile 25 g 5/8 inch needle was used. Topical ethyl chloride was applied.     Contents of syringe included:  triamcinolone acetonide 40 mg  Complications: None    EBL: None   Post Procedure: Patient alert, and moving all extremities. ROM improved, pain decreased.  Good peripheral pulses, no signs of vascular compromise and range of motion intact.  Aftercare instructions were given to patient at time of discharge.  Relative rest for 3 days-avoiding excess activity.  Place ice on the area for 15 minutes every 4-6 hours. Patient may take Tylenol a 1000 mg b.i.d. or ibuprofen 600 mg t.i.d. for the next 3-4 days if not on medication already and safe to take pending co-morbidities.  Protect the area for the next 1-8 hours if anesthetic was used.  Avoid excessive activity for the next 3-4 weeks.  ER precautions given for fever, severe joint pain or allergic reaction or other new symptoms related to the joint injection.      Time Based Billing   None    Please be aware that this note has been generated with the assistance of MModal voice-to-text.  Please excuse any spelling or grammatical errors.          [1]   Current Outpatient Medications:     atorvastatin (LIPITOR) 10 MG tablet, Take 1 tablet (10 mg total) by mouth once daily., Disp: 90 tablet, Rfl: 3    baclofen (LIORESAL) 20 MG tablet, Take 1 tablet every day by oral route at bedtime., Disp: , Rfl:      brinzolamide-brimonidine (SIMBRINZA) 1-0.2 % DrpS, Apply 1 drop to eye 3 (three) times daily., Disp: 8 mL, Rfl: 11    carvediloL (COREG) 12.5 MG tablet, Take 1 tablet (12.5 mg total) by mouth 2 (two) times daily with meals., Disp: 30 tablet, Rfl: 11    ciprofloxacin-dexAMETHasone 0.3-0.1% (CIPRODEX) 0.3-0.1 % DrpS, , Disp: , Rfl:     docusate sodium (COLACE) 100 MG capsule, 1 capsule as needed., Disp: , Rfl:     DULoxetine (CYMBALTA) 60 MG capsule, Take 60 mg by mouth once daily., Disp: , Rfl:     latanoprost 0.005 % ophthalmic solution, Place 1 drop into both eyes once daily., Disp: 2.5 mL, Rfl: 5    naproxen (NAPROSYN) 500 MG tablet, Take 500 mg by mouth 2 (two) times daily., Disp: , Rfl:     pantoprazole (PROTONIX) 40 MG tablet, Take 40 mg by mouth once daily., Disp: , Rfl:     tamsulosin (FLOMAX) 0.4 mg Cap, Take 1 capsule by mouth once daily., Disp: , Rfl:     traZODone (DESYREL) 50 MG tablet, Take 50 mg by mouth nightly., Disp: , Rfl:     albuterol (PROVENTIL) 2.5 mg /3 mL (0.083 %) nebulizer solution, , Disp: , Rfl:     amoxicillin-clavulanate 875-125mg (AUGMENTIN) 875-125 mg per tablet, Take 1 tablet every 12 hours by oral route. (Patient not taking: Reported on 7/10/2024), Disp: , Rfl:     ANDROGEL 20.25 mg/1.25 gram (1.62 %) GlPm, Apply 2 pumps every day by transdermal route. (Patient not taking: Reported on 5/20/2024), Disp: , Rfl:     aspirin (ECOTRIN) 81 MG EC tablet, Take 1 tablet (81 mg total) by mouth once daily., Disp: 90 tablet, Rfl: 3    azithromycin (Z-NOEL) 250 MG tablet, TAKE 2 TABLETS (500 MG) BY ORAL ROUTE ONCE DAILY FOR 1 DAY THEN 1 TABLET (250 MG) BY ORAL ROUTE ONCE DAILY FOR 4 DAYS (Patient not taking: Reported on 7/10/2024), Disp: , Rfl:     brimonidine 0.15 % OPTH DROP (ALPHAGAN) 0.15 % ophthalmic solution, Place 1 drop into both eyes 3 (three) times daily. (Patient not taking: Reported on 11/15/2024), Disp: 10 mL, Rfl: 5    brinzolamide (AZOPT) 1 % ophthalmic suspension, Place 1 drop  into both eyes 3 (three) times daily. (Patient not taking: Reported on 11/15/2024), Disp: 10 mL, Rfl: 5    cetirizine (ZYRTEC) 10 MG tablet, Take 1 tablet by mouth once daily. (Patient not taking: Reported on 5/20/2024), Disp: , Rfl:     ciprofloxacin HCl (CILOXAN) 0.3 % ophthalmic solution, INSTILL 2 DROPS EVERY 6 HOURS TO (R) EAR. (Patient not taking: Reported on 7/10/2024), Disp: , Rfl:     diphenoxylate-atropine 2.5-0.025 mg (LOMOTIL) 2.5-0.025 mg per tablet, , Disp: , Rfl:     doxycycline (VIBRA-TABS) 100 MG tablet, Take 100 mg by mouth 2 (two) times daily. (Patient not taking: Reported on 7/10/2024), Disp: , Rfl:     erythromycin (ROMYCIN) ophthalmic ointment, , Disp: , Rfl:     gabapentin (NEURONTIN) 600 MG tablet, Take 600 mg by mouth 3 (three) times daily. (Patient not taking: Reported on 5/20/2024), Disp: , Rfl:     HYDROcodone-acetaminophen (NORCO)  mg per tablet, 1 tablet as needed. (Patient not taking: Reported on 5/20/2024), Disp: , Rfl:     lisinopriL-hydrochlorothiazide (PRINZIDE,ZESTORETIC) 20-12.5 mg per tablet, Take 1 tablet by mouth once daily., Disp: 90 tablet, Rfl: 3    loperamide (IMODIUM) 2 mg capsule, , Disp: , Rfl:     methocarbamoL (ROBAXIN) 750 MG Tab, Take 750 mg by mouth 3 (three) times daily. (Patient not taking: Reported on 5/20/2024), Disp: , Rfl:     methylPREDNISolone (MEDROL DOSEPACK) 4 mg tablet, follow package directions (Patient not taking: Reported on 5/20/2024), Disp: , Rfl:     metoclopramide HCl (REGLAN) 10 MG tablet, , Disp: , Rfl:     montelukast (SINGULAIR) 10 mg tablet, Take 1 tablet by mouth once daily. (Patient not taking: Reported on 5/20/2024), Disp: , Rfl:     nitroGLYCERIN (NITROSTAT) 0.4 MG SL tablet, Place 1 tablet (0.4 mg total) under the tongue every 5 (five) minutes as needed for Chest pain., Disp: 25 tablet, Rfl: 6    predniSONE (DELTASONE) 20 MG tablet, Take 1 tablet every day by oral route for 3 days. (Patient not taking: Reported on 5/20/2024),  Disp: , Rfl:     sildenafiL (VIAGRA) 100 MG tablet, TAKE ONE TABLET BY MOUTH ONE HALF HOUR PRIOR TO SEX (Patient not taking: Reported on 5/20/2024), Disp: , Rfl:     Current Facility-Administered Medications:     diazePAM tablet 5 mg, 5 mg, Oral, On Call Procedure, JoseUnruly roberts, FNP    triamcinolone acetonide injection 40 mg, 40 mg, Intra-articular, 1 time in Clinic/HOD,     triamcinolone acetonide injection 40 mg, 40 mg, Intra-articular, 1 time in Clinic/HOD,

## 2025-02-28 ENCOUNTER — OFFICE VISIT (OUTPATIENT)
Dept: OPHTHALMOLOGY | Facility: CLINIC | Age: 70
End: 2025-02-28
Payer: MEDICARE

## 2025-02-28 VITALS — WEIGHT: 260.13 LBS | BODY MASS INDEX: 36.42 KG/M2 | HEIGHT: 71 IN

## 2025-02-28 DIAGNOSIS — H40.1123 PRIMARY OPEN ANGLE GLAUCOMA (POAG) OF LEFT EYE, SEVERE STAGE: Primary | ICD-10-CM

## 2025-02-28 PROCEDURE — 99214 OFFICE O/P EST MOD 30 MIN: CPT | Mod: PBBFAC,PN

## 2025-02-28 RX ORDER — TRAMADOL HYDROCHLORIDE 50 MG/1
50 TABLET ORAL 2 TIMES DAILY PRN
COMMUNITY

## 2025-02-28 RX ORDER — ISOSORBIDE MONONITRATE 60 MG/1
1 TABLET, EXTENDED RELEASE ORAL DAILY
COMMUNITY
Start: 2024-10-08

## 2025-02-28 RX ORDER — DORZOLAMIDE HCL 20 MG/ML
1 SOLUTION/ DROPS OPHTHALMIC 3 TIMES DAILY
Qty: 10 ML | Refills: 11 | Status: SHIPPED | OUTPATIENT
Start: 2025-02-28 | End: 2026-02-28

## 2025-02-28 NOTE — PROGRESS NOTES
[facility] clinic 02/28/2025 for repeat IOP check, gonioscopy.     C/O light sensitive, floaters, has glasses,does see better but does not   have them with him, states glasses seems like bifocal line is too ilene  Last edited by Marge Menard, RN on 2/28/2025 12:03 PM.            Assessment /Plan     For exam results, see Encounter Report.    There are no diagnoses linked to this encounter.      Testing  Tmax 28//38  //565    OCT RNFL  3/2017: 77//55 ( S, I red, T borderline, N green)  1/2019 OD: 81 yellow S, otherwise green // unable OS  12/22/20  OD - 80, yellow S, remainder green  OS - poor quality, red S  7/11/2023: 84//15, all green//poor study  10/2023: all green // all red  4/11/24:  77, Red S, Rest green (4/10 SS)  7/19//24: 79, red S, rest green (shifted superior peak)  2/28/25: 73, red S, yellow I, remaining green    [place]  9/29/2020  OD: Open to   OS: Open to SS/  12/2014 Open OU    HVF 24-2  2/18/15 -- Full OD/IAD OS  3/2017 Full OD/ IAD +progression OS  5/13/19 OD only: full and reliable  9/29/2020  OD: 1/10 losses, reliable, early superior arcuate defect  OS: 0/11 losses, total defect  12/23/21  OD: few scattered superior defects, grossly full   OS: generalized depression  1/11/24  OD: FL 0/11, FP 0%, FN 0%, nonspecific scattered defects, essentially full  OS: unable     B-scan OS 7/24/20 - flat and attached, no tears/detachments appreciated     1. Severe POAG OS  2. s/p CEIOL + AGV OS 10/29/2020  - s/p trab with multiple revisions, Due to persistent bleb leak with resultant hypotony with maculopathy s/p trab (11/18/17), trab revision x4 (2/21/18, 2/26/18, 4/3/18, 5/29/18)  - Tmax 38  - //565  - Timolol was discontinued as patient reports it gave him arrhythmias, so will attempt trial off Timolol  Drops: Dorzolamide TID, latanoprost qHS  - 10/23: IOP OK at 18, not using massage, pressure is adequate given poor vision potential  - 1/11/24: IOP stable at 18//14, using  ocular massage sometimes; HVF full OD // unable OS; continue current regimen  - 4/11/24: IOP 16//13, stable. Pt using ocular massage 0-1x/day  - 8/2/24: IOP 14, no pain. Continue drops.  - 12/3/24: IOP 13, stable. Continue current drops.  - 12/20/24: pt with bilateral injection and tr follicular reaction OU in palpebral conj, discussed with patient that this could be 2/2 pressure drops causing irritation. Consider changing drops at next general appointment due to possible irritation from drops    - 01/29/2025: patient with increased IOP after dilation on applanation and tonopen. Repeat gonioscopy at subsequent visit when undilated. Will have patient follow up in [place] clinic 02/28/2025 given severe glaucoma and on near max drops (cannot tolerate timolol, can start rocklatan?). Has had follicular reaction in the past, but given pressure likely cannot take off brimonidine.   - 2/28/25: [place] day: IOP 22 today, patient endorses good compliance. States it would be rare for him to miss a drop, does not recall missing any in the last week. Discussed patient irritated eyes and higher IOP. Per Dr. Mills, irritation can cause increase in eye pressures. Switch patient to dorzolamide TID OU. Continue latanoprost qHS.    3. Open angle borderline findings high risk OD  - IOP stable; in fact likely not glaucomatous due to normal CD and IOP, although high risk  - Tmax 28   - Goal ~19  - Current drops: Simbrinza TID, Latanoprost QHS  - Will hold off on SLT today given good IOP and stable OCT RNFL over last 8 years.   - 12/3/24: IOP today 15, continue current management   - low concern for glaucoma at this time but will continue to treat as precautionary measure  - 2/28/25: IOP today 18. Some progression on OCT RNFL, will repeat OCT RNFL next visit    4. Dry Eye Syndrome vs Glaucoma Medicamentosa OS>OD  - PFATs 6 times a day, WC/LS. Start erythromycin jean OD (ok to also use OU) for dense confluent PEEs   - Excess glaucoma  drops in right eye may be contributing factor  - Discussed punctal plugs vs restasis, pt interested in restasis if possible (has never tried). Told to increase PFATs to at least 6 times daily and continue jean.   - could trial SLT OD to lower IOP and reduce drop burden  - could switch to preservative free glaucoma drops  - 8/28/24: Not using WC or lid scrubs. Discussed proper technique. Recommend Sy mask and avenova scrubs along with PFAT 4-6x/day. If not improved with compliance, could try doxycycline.  - 2/28/25: States irritation has significantly reduced. Wipes his eyelids around 30 minutes after putting in a drop - states helps significantly. Using ATs ~4x a day. Switching patient from simbrinza to dorzolamide.    5. Ptosis OS  - Pt states that he only has noticed it since the surgery  - MRD1 0.5 OS  - doesn't bother pt    6. S/p CEIOL OD  - 7/26/23 yag cap, BCVA 20/20    7. Optic atrophy OS  - May be secondary to the hypotony in 2017/2018    8. PVD OD  9. 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 No flashes/curtains   - DFE 4/11/24 w/o h/t/d  - Monocular precautions   - 7/19/24: Seen with Dr Wilhelm - holes small with pigmentary changes surrounding. Would hold off on treatment at this time given location, unlikely to progress given small size and pigmentary changes.  - 12/3/24: Patient with 1 week history of new floaters. Large PVD noted OD with no new changes in peripheral examination. Will bring back to [place] day for evaluation (does patient need barrier laser?)  -12/20/24: pt reports improving floaters in the right eye however, continues to experience occasional floaters. With essential monocular status of the right eye as seeing eye, with persistent floaters and low risk profile of peripheral laser, will schedule patient at next available procedure day for laser of right eye peripheral atrophic holes. Discussed and seen with Dr. Wilhelm, given  peripheral location and pigmentary changes surrounding, continues to be low risk for detachment, however, after discussion with pt, will move forward for laser retinopexy surrounding holes.   - 01/29/2025: presents for laser retinopexy right eye. 3 pigmented atrophic holes seen in far periphery, prior fundus photo reviewed with Dr. Hager. Discussed r/b/a, patient consented for retinopexy right eye. Patient tolerated procedure today with no complications.     RTC 1 month for an IOP check and repeat OCT RNFL  Will need VF scheduled in future visit

## 2025-03-12 ENCOUNTER — CLINICAL SUPPORT (OUTPATIENT)
Dept: OPHTHALMOLOGY | Facility: CLINIC | Age: 70
End: 2025-03-12
Payer: MEDICARE

## 2025-03-12 VITALS — BODY MASS INDEX: 36.42 KG/M2 | HEIGHT: 71 IN | WEIGHT: 260.13 LBS

## 2025-03-12 DIAGNOSIS — H33.321 RETINAL HOLE OF RIGHT EYE: ICD-10-CM

## 2025-03-12 DIAGNOSIS — H40.1123 PRIMARY OPEN ANGLE GLAUCOMA (POAG) OF LEFT EYE, SEVERE STAGE: Primary | ICD-10-CM

## 2025-03-12 DIAGNOSIS — H40.1111 PRIMARY OPEN ANGLE GLAUCOMA (POAG) OF RIGHT EYE, MILD STAGE: ICD-10-CM

## 2025-03-12 PROCEDURE — 76512 OPH US DX B-SCAN: CPT | Mod: PBBFAC,PN

## 2025-03-12 PROCEDURE — 92133 CPTRZD OPH DX IMG PST SGM ON: CPT | Mod: PBBFAC,PN | Performed by: OPHTHALMOLOGY

## 2025-03-12 PROCEDURE — 76512 OPH US DX B-SCAN: CPT | Mod: 50,PBBFAC,PN | Performed by: OPHTHALMOLOGY

## 2025-03-12 PROCEDURE — 99214 OFFICE O/P EST MOD 30 MIN: CPT | Mod: PBBFAC,PN

## 2025-03-12 PROCEDURE — 92133 CPTRZD OPH DX IMG PST SGM ON: CPT | Mod: PBBFAC,PN

## 2025-03-12 RX ORDER — PHENYLEPH/TROPICAMIDE IN WATER 2.5 %-1 %
1 DROPS OPHTHALMIC (EYE) ONCE
Status: COMPLETED | OUTPATIENT
Start: 2025-03-12 | End: 2025-03-12

## 2025-03-12 RX ADMIN — Medication 1 DROP: at 08:03

## 2025-03-12 NOTE — PROGRESS NOTES
[facility] [place]    RTC 1 month for an IOP check and repeat OCT RNFL  Will need VF scheduled in future visit    Last edited by Marge Menard RN on 3/12/2025  8:09 AM.            Assessment /Plan     For exam results, see Encounter Report.    Primary open angle glaucoma (POAG) of left eye, severe stage  -     tropicamide /PHENYLephrine opthalmic solution 1 drop    Retinal hole of right eye    Primary open angle glaucoma (POAG) of right eye, mild stage      Testing  Tmax 28//38  //565    OCT RNFL  3/2017: 77//55 ( S, I red, T borderline, N green)  1/2019 OD: 81 yellow S, otherwise green // unable OS  12/22/20  OD - 80, yellow S, remainder green  OS - poor quality, red S  7/11/2023: 84//15, all green//poor study  10/2023: all green // all red  4/11/24:  77, Red S, Rest green (4/10 SS)  7/19//24: 79, red S, rest green (shifted superior peak)  2/28/25: 73, red S, yellow I, remaining green  3/12/25: 71, red S yellow I - progression since [id number]    Gonio  9/29/2020  OD: Open to   OS: Open to SS/  12/2014 Open OU    HVF 24-2  2/18/15 -- Full OD/IAD OS  3/2017 Full OD/ IAD +progression OS  5/13/19 OD only: full and reliable  9/29/2020  OD: 1/10 losses, reliable, early superior arcuate defect  OS: 0/11 losses, total defect  12/23/21  OD: few scattered superior defects, grossly full   OS: generalized depression  1/11/24  OD: FL 0/11, FP 0%, FN 0%, nonspecific scattered defects, essentially full  OS: unable     B-scan OS 7/24/20 - flat and attached, no tears/detachments appreciated   B scan os 3/12/25: flat and attached, no tears/detachments    1. Severe POAG OS  2. s/p CEIOL + AGV OS 10/29/2020  - s/p trab with multiple revisions, Due to persistent bleb leak with resultant hypotony with maculopathy s/p trab (11/18/17), trab revision x4 (2/21/18, 2/26/18, 4/3/18, 5/29/18)  - Tmax 38  - Intolerance: Timolol (arrhythmias), brimonidine (follicular reaction)  Drops: Dorzolamide TID, latanoprost qHS  - 10/23: IOP OK at 18,  not using massage, pressure is adequate given poor vision potential  - 1/11/24: IOP stable at 18//14, using ocular massage sometimes; HVF full OD // unable OS; continue current regimen  - 4/11/24: IOP 16//13, stable. Pt using ocular massage 0-1x/day  - 8/2/24: IOP 14, no pain. Continue drops.  - 12/3/24: IOP 13, stable. Continue current drops.  - 12/20/24: pt with bilateral injection and tr follicular reaction OU in palpebral conj, discussed with patient that this could be 2/2 pressure drops causing irritation. Consider changing drops at next general appointment due to possible irritation from drops    - 01/29/2025: patient with increased IOP after dilation on applanation and tonopen. Will have patient follow up in [facility] 02/28/2025 given severe glaucoma and on near max drops (cannot tolerate timolol, can start rocklatan?).   - 2/28/25: [place] day: IOP 22 today, patient endorses good compliance. Discussed patient irritated eyes and higher IOP. Per Dr. Mills, irritation can cause increase in eye pressures. Switch from simbrinza patient to dorzolamide TID OU. Continue latanoprost qHS.  - 3/12/25: endorses compliance with drops. IOP. Continue current management. OCT RNFL unreadable, OCT mac with irregular findings concerning for SRF. DFE hazy but appears WNL, b scan flat. Continue current management.      3. POAG, at least mild, OD  - Tmax 28   - Goal ~19  - CDR: 0.3  - Current drops: Dorzolamide TID, Latanoprost QHS  - Intolerance: Timolol (arrhythmias), brimonidine (follicular reaction)  - 2/28/25: IOP today 18. Some progression on OCT RNFL, will repeat OCT RNFL next visit  - 3/12/25: IOP 16 via tonopen at presentation. Post-dilation IOP 30. Confirmed with tonopen. Gonio open OD post dilation. OCT today with confirmed progression OD from 2023/2024. Given progression and elevated IOP post dilation, recommend coming back for SLT in 6-8 weeks.   - Will need updated VF testing after SLT  - Continue  Dorzolamide, Latanoprost    4. Dry Eye Syndrome vs Glaucoma Medicamentosa OS>OD  - PFATs 6 times a day, WC/LS. Start erythromycin jean OD (ok to also use OU) for dense confluent PEEs   - Excess glaucoma drops in right eye may be contributing factor  - Discussed punctal plugs vs restasis, pt interested in restasis if possible (has never tried). Told to increase PFATs to at least 6 times daily and continue jean.   - could trial SLT OD to lower IOP and reduce drop burden  - could switch to preservative free glaucoma drops  - 8/28/24: Not using WC or lid scrubs. Discussed proper technique. Recommend Sy mask and avenova scrubs along with PFAT 4-6x/day. If not improved with compliance, could try doxycycline.  - 2/28/25: States irritation has significantly reduced. Wipes his eyelids around 30 minutes after putting in a drop - states helps significantly. Using ATs ~4x a day. Switching patient from simbrinza to dorzolamide.  - 3/12/25: improving now off of brimonidine. Encouraged increased use of PFATs.    5. Ptosis OS  - Pt states that he only has noticed it since the surgery  - MRD1 0.5 OS  - doesn't bother pt    6. S/p CEIOL OD  - 7/26/23 yag cap, BCVA 20/20    7. Optic atrophy OS  - May be secondary to the hypotony in 2017/2018    8. PVD OD  9. 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 No flashes/curtains   - Monocular precautions   - 7/19/24: Seen with Dr Wilhelm - holes small with pigmentary changes surrounding. Would hold off on treatment at this time given location, unlikely to progress given small size and pigmentary changes.  - 12/3/24: Patient with 1 week history of new floaters. Large PVD noted OD with no new changes in peripheral examination. Will bring back to [place] day for evaluation (does patient need barrier laser?)  -12/20/24: pt reports improving floaters in the right eye however, continues to experience occasional floaters. With essential  monocular status of the right eye as seeing eye, with persistent floaters and low risk profile of peripheral laser, will schedule patient at next available procedure day for laser of right eye peripheral atrophic holes. Discussed and seen with Dr. Wilhelm, given peripheral location and pigmentary changes surrounding, continues to be low risk for detachment, however, after discussion with pt, will move forward for laser retinopexy surrounding holes.   - 01/29/2025: presents for laser retinopexy right eye. 3 pigmented atrophic holes seen in far periphery, prior fundus photo reviewed with Dr. Hager. Discussed r/b/a, patient consented for retinopexy right eye. Patient tolerated procedure today with no complications.   - 3/12/25: no subretinal fluid on exam. Laser scars did not take well, not visible on optos or DFE. For now, will plan to perform SLT as elevated IOP in monocular patient is the priority. Will discuss with Dr. Wilhelm prior to patient's next appt.       RTC 6 weeks SLT OD

## 2025-03-13 PROBLEM — H40.1123 PRIMARY OPEN ANGLE GLAUCOMA (POAG) OF LEFT EYE, SEVERE STAGE: Status: ACTIVE | Noted: 2025-03-13

## 2025-03-20 ENCOUNTER — OFFICE VISIT (OUTPATIENT)
Dept: ORTHOPEDICS | Facility: CLINIC | Age: 70
End: 2025-03-20
Payer: MEDICARE

## 2025-03-20 ENCOUNTER — HOSPITAL ENCOUNTER (OUTPATIENT)
Dept: RADIOLOGY | Facility: HOSPITAL | Age: 70
Discharge: HOME | End: 2025-03-20
Attending: NURSE PRACTITIONER
Payer: MEDICARE

## 2025-03-20 VITALS
HEART RATE: 71 BPM | RESPIRATION RATE: 16 BRPM | DIASTOLIC BLOOD PRESSURE: 80 MMHG | TEMPERATURE: 98 F | HEIGHT: 71 IN | WEIGHT: 251.31 LBS | BODY MASS INDEX: 35.18 KG/M2 | OXYGEN SATURATION: 96 % | SYSTOLIC BLOOD PRESSURE: 154 MMHG

## 2025-03-20 DIAGNOSIS — M75.101 ROTATOR CUFF SYNDROME, RIGHT: Primary | ICD-10-CM

## 2025-03-20 DIAGNOSIS — M25.511 RIGHT SHOULDER PAIN, UNSPECIFIED CHRONICITY: ICD-10-CM

## 2025-03-20 PROCEDURE — 3079F DIAST BP 80-89 MM HG: CPT | Mod: CPTII,,, | Performed by: NURSE PRACTITIONER

## 2025-03-20 PROCEDURE — 99215 OFFICE O/P EST HI 40 MIN: CPT | Mod: PBBFAC,25 | Performed by: NURSE PRACTITIONER

## 2025-03-20 PROCEDURE — 99214 OFFICE O/P EST MOD 30 MIN: CPT | Mod: S$PBB,,, | Performed by: NURSE PRACTITIONER

## 2025-03-20 PROCEDURE — 1126F AMNT PAIN NOTED NONE PRSNT: CPT | Mod: CPTII,,, | Performed by: NURSE PRACTITIONER

## 2025-03-20 PROCEDURE — 3288F FALL RISK ASSESSMENT DOCD: CPT | Mod: CPTII,,, | Performed by: NURSE PRACTITIONER

## 2025-03-20 PROCEDURE — 3077F SYST BP >= 140 MM HG: CPT | Mod: CPTII,,, | Performed by: NURSE PRACTITIONER

## 2025-03-20 PROCEDURE — 1101F PT FALLS ASSESS-DOCD LE1/YR: CPT | Mod: CPTII,,, | Performed by: NURSE PRACTITIONER

## 2025-03-20 PROCEDURE — 4010F ACE/ARB THERAPY RXD/TAKEN: CPT | Mod: CPTII,,, | Performed by: NURSE PRACTITIONER

## 2025-03-20 PROCEDURE — 1159F MED LIST DOCD IN RCRD: CPT | Mod: CPTII,,, | Performed by: NURSE PRACTITIONER

## 2025-03-20 PROCEDURE — 1160F RVW MEDS BY RX/DR IN RCRD: CPT | Mod: CPTII,,, | Performed by: NURSE PRACTITIONER

## 2025-03-20 PROCEDURE — 73030 X-RAY EXAM OF SHOULDER: CPT | Mod: TC,RT

## 2025-03-20 PROCEDURE — 3008F BODY MASS INDEX DOCD: CPT | Mod: CPTII,,, | Performed by: NURSE PRACTITIONER

## 2025-03-20 NOTE — PROGRESS NOTES
[facility]  Subjective:   PATIENT ID: Doug Faye is a 69 y.o. male.   CHIEF COMPLAINT: Follow-up of the Right Shoulder (Patient presents to clinic with Right shoulder f/u pt stated he hasn't felt any pain but if he start to lay on that side it starts to hurt him. At home therapy treatments includes Ibuprofen prn with minimal relief./)    HPI:  Right aching deep shoulder pain.   Injury/ Surgical HX r/t CC:  no HX of prior significant joint injury   Onset: Insidious over several years  fluctuates    Modifying Factors: exacerbated by:  overhead and posterior reaching, worse with activity, laying on affected side, lifting heavy objects improved with:  rest, immobilization, massage   Associated Symptoms:  [] Joint locking/ catching  [x] Numbness of UE / hand  [] Radiates into neck   [x] Radiates into arm [x] UE /  strength weakness  [x] Crepitus  [] Swelling  [x] Difficult sleeping        Activity: sedentary with light activity and pain mildly interferes with ADLs    Previous Treatments:  [] HEP > 6 weeks  [] RX PT   [] Arm splint [x] OTC Pain Relievers: Tylenol, ibuprofen  [x] RX Medications: naproxen, Tramadol, Norco, gabapentin, methocarbamol, baclofen  [] CSI    PMH:  non-smoker, obesity, and HTN   Family History: + OA   NOTE:  Existing patient with new compliant referred for right shoulder pain with minimal conservative treatments.  Symptoms  are mild and are not affecting ADLs.   Hand Dominance: Right dominant   Employment HX: , currently retired.     Current Medications[1]  Review of patient's allergies indicates:   Allergen Reactions    Penicillins      Other reaction(s): Passed out     REVIEW OF SYSTEMS:  A ten-point review of systems was performed and is negative, except as mentioned above   Objective:   Body mass index is 35.05 kg/m².  Vitals:    03/20/25 1038 03/20/25 1043   BP: (!) 154/83 (!) 154/80   Pulse: 71    Resp: 16    Temp: 97.7  "°F (36.5 °C)    TempSrc: Oral    SpO2: 96%    Weight: 114 kg (251 lb 5.2 oz)    Height: 5' 11" (1.803 m)    PainSc: 0-No pain      MSK Shoulder Exam  General:  no apparent distress, no pain indicators,  obesity  Inspection: poor posture with rounded upper back noted  LEFT SHOULDER RIGHT SHOULDER   no soft tissue swelling, no guarding/ self imposed immobilization of shoulder, no winged scapula, no scapula dyskinesis,  no erythema, no contusion,  no masses, no scars, no clavicle deformity, no shoulder dislocation deformity no soft tissue swelling, no guarding/ self imposed immobilization of shoulder, no winged scapula, no scapula dyskinesis, no erythema, no contusion,  no masses, no scars, no clavicle deformity, no shoulder dislocation deformity     Palpation:     LEFT SHOULDER RIGHT SHOULDER   normal temperature, no deconditioning supraspinatus, no deconditioning infraspinatus, no tenderness of AC joint, subacromial bursa, GH joint, supraspinatus, infraspinatus, and trapezius, no tenderness noted of suprasternal notch/ sternoclavicular joint, clavicle, coracoid process, acromion, bicipital groove, posterior wall of axilla, anterior wall of axilla, rhomboids, scapula spine, bicep, and cervical spine, no crepitus with ROM, no palpable hypersensitive nodule/ trigger point    normal temperature, noted mild deconditioning supraspinatus, noted mild deconditioning infraspinatus, noted tenderness of AC joint, subacromial bursa, GH joint, supraspinatus, infraspinatus, and trapezius, no tenderness noted of suprasternal notch/ sternoclavicular joint, clavicle, coracoid process, acromion, bicipital groove, posterior wall of axilla, anterior wall of axilla, rhomboids, scapula spine, bicep, and cervical spine, no crepitus with ROM, no palpable hypersensitive nodule/ trigger point        ROM Active Flexion / Extension  LEFT SHOULDER RIGHT SHOULDER   Abduction 180  Horizontal Adduction 45  Posterior Extension 45  Forward Flexion " "180  Internal Rotation L-spine  External Rotation 60 Abduction 16  Horizontal Adduction 45  Posterior Extension 45  Forward Flexion 180  Internal Rotation sacrum  External Rotation 50     Strength   LEFT SHOULDER RIGHT SHOULDER   Flexion 5 / 5   Extension 5 / 5  Abductors 5 / 5   Adduction 5 / 5  External Rotation 5 / 5  Internal Rotation 5 / 5  Scapular Elevation 5 / 5  Scapular Protraction 5 / 5 Flexion 5 / 5   Extension 5 / 5  Abductors 5 / 5   Adduction 5 / 5  External Rotation 5 / 5  Internal Rotation 5 / 5  Scapular Elevation 5 / 5  Scapular Protraction 5 / 5     Special Testing:         Bicep Tendon L+ L-- R+ R-- Not Tested    Hook Test [] [x] [] [x] []    Leonardo Sign [] [x] [] [x] []    Rotator Cuff         Full Can [] [x] [x] [] []    Empty Can [] [x] [] [x] []    Lift Off  [] [x] [] [x] []    Belly Press [] [x] [] [x] []    Hornblower [] [x] [] [x] []    Drop Arm [] [x] [] [x] []    AC Joint         Cross Arm Adduction [] [x] [x] [] []    Impingement         Hawkin's [] [x] [x] [] []    Painful Arc  [] [x] [] [x] []    Painful Arc 170-180 [] [x] [] [x] []    Instability         Shoulder Appreh. [] [x] [] [x] []    Labral         Gonzales's [] [x] [] [x] []       Cervical ROM Pain negative  Neurovascular: Intact to light touch  Neuro/ Psych: Awake, alert, oriented, normal mood and affect  Lymphatic: No LAD  Skin/ Soft Tissue: no rash, skin intact  Assessment:   IMAGING:  XR Ordered by me today 4 views of right shoulder reviewed and independently interpreted by me with findings suggestive of arthritic changes .  Awaiting radiologist findings.  Findings discussed with patient today.    MRI/CT: none    EMG Study: none    LABS: No results found for: "HGBA1C"  EMR REVIEW: completed with noted Referral documentation reviewed and prior visit 2/27/25 reviewed.  Diagnosis & Treatment Plan:   DIAGNOSIS: Moderate exacerbation of chronic Right Rotator Cuff Syndrome complicated by mild AC arthritis   1. Rotator " cuff syndrome, right    2. BMI 35.0-35.9,adult      TREATMENT PLAN:  Orders Placed This Encounter    X-ray Shoulder 2 or More Views Right     Treatment plan:    Patient reports adequate relief at this time and is clinically mild symptoms with exam. I recommend discussing with PCP continuing medication regimen and consistently doing HEP 2-3 times per week in order to maintain desired symptom relief.  Patient encouraged to f/u with PCP for continued primary care and RTC in future if symptoms return.   Ongoing education about DX, treatment recommendations and reasonable expectations including goal to decrease pain and increase function.  Conservative treatments including, but limited to:  activity modification as needed, daily HEP with TheraBand, BMI reduction goal 5-10% of body weight, adequate vit D/C, glucosamine 1500 mg/day and/or daily acetaminophen 1000 mg 3 times/ day if able to tolerate.    Procedure: CSI discussed as treatment options in future if conservative treatments fail.   RX Medications: continue medications as RX per PCP.  RTC:  PRN if symptoms worsen or return  NOTE: None    Leah Menard-Neumann FNP [facility]  Procedure Note:   None  Time Based Billing   Total Time Spent with Patient: 30 minutes   Visit Start Time: 1045  10 minutes spent prior to visit reviewing EMR, prior labs and x-rays  10 minutes spent in visit with patient face-to-face time completing exam, obtaining history, educating on DX and treatment plan.  10 minutes spent after visit completing EMR documentation.   Visit End Time: 1115     *Please be aware that this note has been generated with the assistance of MModal voice-to-text.  Please excuse any spelling or grammatical errors. Positive findings indicted by checkmark*         [1]   Current Outpatient Medications:     albuterol (PROVENTIL) 2.5 mg /3 mL (0.083 %) nebulizer solution, , Disp: , Rfl:     atorvastatin (LIPITOR) 10 MG tablet, Take 1 tablet  (10 mg total) by mouth once daily., Disp: 90 tablet, Rfl: 3    baclofen (LIORESAL) 20 MG tablet, Take 1 tablet every day by oral route at bedtime., Disp: , Rfl:     carvediloL (COREG) 12.5 MG tablet, Take 1 tablet (12.5 mg total) by mouth 2 (two) times daily with meals., Disp: 30 tablet, Rfl: 11    docusate sodium (COLACE) 100 MG capsule, 1 capsule as needed., Disp: , Rfl:     dorzolamide (TRUSOPT) 2 % ophthalmic solution, Place 1 drop into both eyes 3 (three) times daily., Disp: 10 mL, Rfl: 11    DULoxetine (CYMBALTA) 60 MG capsule, Take 60 mg by mouth once daily., Disp: , Rfl:     latanoprost 0.005 % ophthalmic solution, Place 1 drop into both eyes once daily., Disp: 2.5 mL, Rfl: 5    naproxen (NAPROSYN) 500 MG tablet, Take 500 mg by mouth 2 (two) times daily., Disp: , Rfl:     pantoprazole (PROTONIX) 40 MG tablet, Take 40 mg by mouth once daily., Disp: , Rfl:     tamsulosin (FLOMAX) 0.4 mg Cap, Take 1 capsule by mouth once daily., Disp: , Rfl:     traMADoL (ULTRAM) 50 mg tablet, Take 50 mg by mouth 2 (two) times daily as needed., Disp: , Rfl:     traZODone (DESYREL) 50 MG tablet, Take 50 mg by mouth nightly., Disp: , Rfl:     amoxicillin-clavulanate 875-125mg (AUGMENTIN) 875-125 mg per tablet, Take 1 tablet every 12 hours by oral route. (Patient not taking: Reported on 3/20/2025), Disp: , Rfl:     ANDROGEL 20.25 mg/1.25 gram (1.62 %) GlPm, Apply 2 pumps every day by transdermal route. (Patient not taking: Reported on 3/20/2025), Disp: , Rfl:     aspirin (ECOTRIN) 81 MG EC tablet, Take 1 tablet (81 mg total) by mouth once daily., Disp: 90 tablet, Rfl: 3    azithromycin (Z-NOEL) 250 MG tablet, TAKE 2 TABLETS (500 MG) BY ORAL ROUTE ONCE DAILY FOR 1 DAY THEN 1 TABLET (250 MG) BY ORAL ROUTE ONCE DAILY FOR 4 DAYS (Patient not taking: Reported on 3/20/2025), Disp: , Rfl:     brimonidine 0.15 % OPTH DROP (ALPHAGAN) 0.15 % ophthalmic solution, Place 1 drop into both eyes 3 (three) times daily. (Patient not taking: Reported  on 3/20/2025), Disp: 10 mL, Rfl: 5    brinzolamide (AZOPT) 1 % ophthalmic suspension, Place 1 drop into both eyes 3 (three) times daily. (Patient not taking: Reported on 3/20/2025), Disp: 10 mL, Rfl: 5    cetirizine (ZYRTEC) 10 MG tablet, Take 1 tablet by mouth once daily. (Patient not taking: Reported on 5/20/2024), Disp: , Rfl:     ciprofloxacin HCl (CILOXAN) 0.3 % ophthalmic solution, INSTILL 2 DROPS EVERY 6 HOURS TO (R) EAR. (Patient not taking: Reported on 7/10/2024), Disp: , Rfl:     ciprofloxacin-dexAMETHasone 0.3-0.1% (CIPRODEX) 0.3-0.1 % DrpS, , Disp: , Rfl:     diphenoxylate-atropine 2.5-0.025 mg (LOMOTIL) 2.5-0.025 mg per tablet, , Disp: , Rfl:     doxycycline (VIBRA-TABS) 100 MG tablet, Take 100 mg by mouth 2 (two) times daily. (Patient not taking: Reported on 3/20/2025), Disp: , Rfl:     erythromycin (ROMYCIN) ophthalmic ointment, , Disp: , Rfl:     gabapentin (NEURONTIN) 600 MG tablet, Take 600 mg by mouth 3 (three) times daily. (Patient not taking: Reported on 5/20/2024), Disp: , Rfl:     HYDROcodone-acetaminophen (NORCO)  mg per tablet, 1 tablet as needed. (Patient not taking: Reported on 5/20/2024), Disp: , Rfl:     isosorbide mononitrate (IMDUR) 60 MG 24 hr tablet, Take 1 tablet by mouth once daily. (Patient not taking: Reported on 3/20/2025), Disp: , Rfl:     lisinopriL-hydrochlorothiazide (PRINZIDE,ZESTORETIC) 20-12.5 mg per tablet, Take 1 tablet by mouth once daily., Disp: 90 tablet, Rfl: 3    loperamide (IMODIUM) 2 mg capsule, , Disp: , Rfl:     methocarbamoL (ROBAXIN) 750 MG Tab, Take 750 mg by mouth 3 (three) times daily. (Patient not taking: Reported on 3/20/2025), Disp: , Rfl:     methylPREDNISolone (MEDROL DOSEPACK) 4 mg tablet, follow package directions (Patient not taking: Reported on 3/20/2025), Disp: , Rfl:     metoclopramide HCl (REGLAN) 10 MG tablet, , Disp: , Rfl:     montelukast (SINGULAIR) 10 mg tablet, Take 1 tablet by mouth once daily. (Patient not taking: Reported on  3/20/2025), Disp: , Rfl:     nitroGLYCERIN (NITROSTAT) 0.4 MG SL tablet, Place 1 tablet (0.4 mg total) under the tongue every 5 (five) minutes as needed for Chest pain., Disp: 25 tablet, Rfl: 6    predniSONE (DELTASONE) 20 MG tablet, Take 1 tablet every day by oral route for 3 days. (Patient not taking: Reported on 3/20/2025), Disp: , Rfl:     sildenafiL (VIAGRA) 100 MG tablet, TAKE ONE TABLET BY MOUTH ONE HALF HOUR PRIOR TO SEX (Patient not taking: Reported on 3/20/2025), Disp: , Rfl:     Current Facility-Administered Medications:     diazePAM tablet 5 mg, 5 mg, Oral, On Call Procedure, Unruly Garcia, DAVIDP

## 2025-03-21 ENCOUNTER — DOCUMENTATION ONLY (OUTPATIENT)
Dept: OPHTHALMOLOGY | Facility: CLINIC | Age: 70
End: 2025-03-21
Payer: MEDICARE

## 2025-03-21 NOTE — PROGRESS NOTES
Patient discussed with Dr. Wilhelm. Most likely reason for poor laser uptake is that the hole is near ciliary nerves and was poorly tolerated. When barricade laser is next attempted, recommend first lasering superior and inferior to hole to minimize pain and attempt area nasal to hole last.     Dr. Wilhelm agrees elevated IOP is most pressing threat to vision loss and agrees with proceeding with SLT prior to further barricade laser on asymptomatic retinal hole.     RTC as scheduled for SLT

## 2025-05-07 ENCOUNTER — OFFICE VISIT (OUTPATIENT)
Dept: OPHTHALMOLOGY | Facility: CLINIC | Age: 70
End: 2025-05-07
Payer: MEDICARE

## 2025-05-07 VITALS — BODY MASS INDEX: 35.18 KG/M2 | HEIGHT: 71 IN | WEIGHT: 251.31 LBS

## 2025-05-07 DIAGNOSIS — H33.321 RETINAL HOLE OF RIGHT EYE: ICD-10-CM

## 2025-05-07 DIAGNOSIS — H47.20 OPTIC ATROPHY OF LEFT EYE: ICD-10-CM

## 2025-05-07 DIAGNOSIS — H02.88A MEIBOMIAN GLAND DYSFUNCTION (MGD) OF UPPER AND LOWER LIDS OF BOTH EYES: ICD-10-CM

## 2025-05-07 DIAGNOSIS — H40.1111 PRIMARY OPEN ANGLE GLAUCOMA (POAG) OF RIGHT EYE, MILD STAGE: ICD-10-CM

## 2025-05-07 DIAGNOSIS — H40.1123 PRIMARY OPEN ANGLE GLAUCOMA (POAG) OF LEFT EYE, SEVERE STAGE: Primary | ICD-10-CM

## 2025-05-07 DIAGNOSIS — H02.88B MEIBOMIAN GLAND DYSFUNCTION (MGD) OF UPPER AND LOWER LIDS OF BOTH EYES: ICD-10-CM

## 2025-05-07 PROCEDURE — 99213 OFFICE O/P EST LOW 20 MIN: CPT | Mod: PBBFAC,PN

## 2025-05-07 RX ORDER — ACETAZOLAMIDE 250 MG/1
500 TABLET ORAL 2 TIMES DAILY
Qty: 120 TABLET | Refills: 1 | Status: SHIPPED | OUTPATIENT
Start: 2025-05-07

## 2025-05-07 NOTE — PROGRESS NOTES
Testing  Tmax 28//38  //565    OCT RNFL  3/2017: 77//55 ( S, I red, T borderline, N green)  1/2019 OD: 81 yellow S, otherwise green // unable OS  12/22/20  OD - 80, yellow S, remainder green  OS - poor quality, red S  7/11/2023: 84//15, all green//poor study  10/2023: all green // all red  4/11/24:  77, Red S, Rest green (4/10 SS)  7/19//24: 79, red S, rest green (shifted superior peak)  2/28/25: 73, red S, yellow I, remaining green  3/12/25: 71, red S yellow I - progression since [id number]    Gonio  9/29/2020  OD: Open to   OS: Open to SS/  12/2014 Open OU    HVF 24-2  2/18/15 -- Full OD/IAD OS  3/2017 Full OD/ IAD +progression OS  5/13/19 OD only: full and reliable  9/29/2020  OD: 1/10 losses, reliable, early superior arcuate defect  OS: 0/11 losses, total defect  12/23/21  OD: few scattered superior defects, grossly full   OS: generalized depression  1/11/24  OD: FL 0/11, FP 0%, FN 0%, nonspecific scattered defects, essentially full  OS: unable     B-scan OS 7/24/20 - flat and attached, no tears/detachments appreciated   B scan os 3/12/25: flat and attached, no tears/detachments    1. Severe POAG OS  2. s/p CEIOL + AGV OS 10/29/2020  - s/p trab with multiple revisions, Due to persistent bleb leak with resultant hypotony with maculopathy s/p trab (11/18/17), trab revision x4 (2/21/18, 2/26/18, 4/3/18, 5/29/18)  - Tmax 38  - Intolerance: Timolol (arrhythmias), brimonidine (follicular reaction)  Drops: Dorzolamide TID, latanoprost qHS  - 10/23: IOP OK at 18, not using massage, pressure is adequate given poor vision potential  - 1/11/24: IOP stable at 18//14, using ocular massage sometimes; HVF full OD // unable OS; continue current regimen  - 4/11/24: IOP 16//13, stable. Pt using ocular massage 0-1x/day  - 8/2/24: IOP 14, no pain. Continue drops.  - 12/3/24: IOP 13, stable. Continue current drops.  - 12/20/24: pt with bilateral injection and tr follicular reaction OU in palpebral conj, discussed  with patient that this could be 2/2 pressure drops causing irritation. Consider changing drops at next general appointment due to possible irritation from drops    - 01/29/2025: patient with increased IOP after dilation on applanation and tonopen. Will have patient follow up in [place] [facility] 02/28/2025 given severe glaucoma and on near max drops (cannot tolerate timolol, can start rocklatan?).   - 2/28/25: [place] day: IOP 22 today, patient endorses good compliance. Discussed patient irritated eyes and higher IOP. Per Dr. Mills, irritation can cause increase in eye pressures. Switch from simbrinza patient to dorzolamide TID OU. Continue latanoprost qHS.  - 3/12/25: endorses compliance with drops. IOP. Continue current management. OCT RNFL unreadable, OCT mac with irregular findings concerning for SRF. DFE hazy but appears WNL, b scan flat. Continue current management.       3. POAG, at least mild, OD  - Tmax 28   - Goal ~19  - CDR: 0.3  - Current drops: Dorzolamide TID, Latanoprost QHS  - Intolerance: Timolol (arrhythmias), brimonidine (follicular reaction)  - 2/28/25: IOP today 18. Some progression on OCT RNFL, will repeat OCT RNFL next visit  - 3/12/25: IOP 16 via tonopen at presentation. Post-dilation IOP 30. Confirmed with tonopen. Gonio open OD post dilation. OCT today with confirmed progression OD from 2023/2024. Given progression and elevated IOP post dilation, recommend coming back for SLT in 6-8 weeks.   - 5/7/2025: IOP 24 OD. Here for SLT today, however, prior to procedure gonio with Dr. Hager. Narrow angles. Pt would likely benefit from LPI rather than SLT. Pt is monocular and odes not have  today. Will start Diamox and bring back for LPI.   - Will need updated VF testing after LPI  - Continue Dorzolamide, Latanoprost  - Start Diamox 500 mg BID    4. Dry Eye Syndrome vs Glaucoma Medicamentosa OS>OD  - PFATs 6 times a day, WC/LS. Start erythromycin jean OD (ok to also use OU) for dense confluent  PEEs   - Excess glaucoma drops in right eye may be contributing factor  - Discussed punctal plugs vs restasis, pt interested in restasis if possible (has never tried). Told to increase PFATs to at least 6 times daily and continue jean.   - could trial SLT OD to lower IOP and reduce drop burden  - could switch to preservative free glaucoma drops  - 8/28/24: Not using WC or lid scrubs. Discussed proper technique. Recommend Sy mask and avenova scrubs along with PFAT 4-6x/day. If not improved with compliance, could try doxycycline.  - 2/28/25: States irritation has significantly reduced. Wipes his eyelids around 30 minutes after putting in a drop - states helps significantly. Using ATs ~4x a day. Switching patient from simbrinza to dorzolamide.  - 3/12/25: improving now off of brimonidine. Encouraged increased use of PFATs.    5. Ptosis OS  - Pt states that he only has noticed it since the surgery  - MRD1 0.5 OS  - doesn't bother pt    6. S/p CEIOL OD  - 7/26/23 yag cap, BCVA 20/20    7. Optic atrophy OS  - May be secondary to the hypotony in 2017/2018    8. PVD OD  9. 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 No flashes/curtains   - Monocular precautions   - 7/19/24: Seen with Dr Wilhelm - holes small with pigmentary changes surrounding. Would hold off on treatment at this time given location, unlikely to progress given small size and pigmentary changes.  - 12/3/24: Patient with 1 week history of new floaters. Large PVD noted OD with no new changes in peripheral examination. Will bring back to [place] day for evaluation (does patient need barrier laser?)  -12/20/24: pt reports improving floaters in the right eye however, continues to experience occasional floaters. With essential monocular status of the right eye as seeing eye, with persistent floaters and low risk profile of peripheral laser, will schedule patient at next available procedure day for laser of  right eye peripheral atrophic holes. Discussed and seen with Dr. Wilhelm, given peripheral location and pigmentary changes surrounding, continues to be low risk for detachment, however, after discussion with pt, will move forward for laser retinopexy surrounding holes.   - 01/29/2025: presents for laser retinopexy right eye. 3 pigmented atrophic holes seen in far periphery, prior fundus photo reviewed with Dr. Hager. Discussed r/b/a, patient consented for retinopexy right eye. Patient tolerated procedure today with no complications.   - 3/12/25: no subretinal fluid on exam. Laser scars did not take well, not visible on optos or DFE. For now, will plan to perform SLT as elevated IOP in monocular patient is the priority. Will discuss with Dr. Wilhelm prior to patient's next appt.       RTC 4 weeks LPI OD

## 2025-05-15 ENCOUNTER — OFFICE VISIT (OUTPATIENT)
Dept: CARDIOLOGY | Facility: CLINIC | Age: 70
End: 2025-05-15
Payer: MEDICARE

## 2025-05-15 ENCOUNTER — LAB VISIT (OUTPATIENT)
Dept: LAB | Facility: HOSPITAL | Age: 70
End: 2025-05-15
Attending: NURSE PRACTITIONER
Payer: MEDICARE

## 2025-05-15 VITALS
SYSTOLIC BLOOD PRESSURE: 133 MMHG | TEMPERATURE: 98 F | HEART RATE: 60 BPM | DIASTOLIC BLOOD PRESSURE: 75 MMHG | WEIGHT: 246 LBS | BODY MASS INDEX: 34.44 KG/M2 | HEIGHT: 71 IN | OXYGEN SATURATION: 98 % | RESPIRATION RATE: 18 BRPM

## 2025-05-15 DIAGNOSIS — I25.10 CORONARY ARTERY DISEASE INVOLVING NATIVE CORONARY ARTERY OF NATIVE HEART WITHOUT ANGINA PECTORIS: ICD-10-CM

## 2025-05-15 DIAGNOSIS — E78.5 HYPERLIPIDEMIA LDL GOAL <70: ICD-10-CM

## 2025-05-15 DIAGNOSIS — G47.33 OSA TREATED WITH BIPAP: ICD-10-CM

## 2025-05-15 DIAGNOSIS — I25.118 CORONARY ARTERY DISEASE OF NATIVE ARTERY OF NATIVE HEART WITH STABLE ANGINA PECTORIS: ICD-10-CM

## 2025-05-15 DIAGNOSIS — R06.09 DOE (DYSPNEA ON EXERTION): ICD-10-CM

## 2025-05-15 DIAGNOSIS — I10 PRIMARY HYPERTENSION: Primary | ICD-10-CM

## 2025-05-15 DIAGNOSIS — I73.9 CLAUDICATION: ICD-10-CM

## 2025-05-15 LAB
ALBUMIN SERPL-MCNC: 3.5 G/DL (ref 3.4–4.8)
ALBUMIN/GLOB SERPL: 0.9 RATIO (ref 1.1–2)
ALP SERPL-CCNC: 58 UNIT/L (ref 40–150)
ALT SERPL-CCNC: 14 UNIT/L (ref 0–55)
ANION GAP SERPL CALC-SCNC: 5 MEQ/L
AST SERPL-CCNC: 20 UNIT/L (ref 11–45)
BILIRUB SERPL-MCNC: 0.6 MG/DL
BUN SERPL-MCNC: 23.7 MG/DL (ref 8.4–25.7)
CALCIUM SERPL-MCNC: 9 MG/DL (ref 8.8–10)
CHLORIDE SERPL-SCNC: 115 MMOL/L (ref 98–107)
CHOLEST SERPL-MCNC: 90 MG/DL
CHOLEST/HDLC SERPL: 4 {RATIO} (ref 0–5)
CO2 SERPL-SCNC: 22 MMOL/L (ref 23–31)
CREAT SERPL-MCNC: 1.14 MG/DL (ref 0.72–1.25)
CREAT/UREA NIT SERPL: 21
GFR SERPLBLD CREATININE-BSD FMLA CKD-EPI: >60 ML/MIN/1.73/M2
GLOBULIN SER-MCNC: 3.7 GM/DL (ref 2.4–3.5)
GLUCOSE SERPL-MCNC: 105 MG/DL (ref 82–115)
HDLC SERPL-MCNC: 21 MG/DL (ref 35–60)
LDLC SERPL CALC-MCNC: 32 MG/DL (ref 50–140)
POTASSIUM SERPL-SCNC: 3.7 MMOL/L (ref 3.5–5.1)
PROT SERPL-MCNC: 7.2 GM/DL (ref 5.8–7.6)
SODIUM SERPL-SCNC: 142 MMOL/L (ref 136–145)
TRIGL SERPL-MCNC: 184 MG/DL (ref 34–140)
VLDLC SERPL CALC-MCNC: 37 MG/DL

## 2025-05-15 PROCEDURE — 99215 OFFICE O/P EST HI 40 MIN: CPT | Mod: PBBFAC | Performed by: NURSE PRACTITIONER

## 2025-05-15 PROCEDURE — 80053 COMPREHEN METABOLIC PANEL: CPT

## 2025-05-15 PROCEDURE — 80061 LIPID PANEL: CPT

## 2025-05-15 PROCEDURE — 36415 COLL VENOUS BLD VENIPUNCTURE: CPT

## 2025-05-15 RX ORDER — LISINOPRIL AND HYDROCHLOROTHIAZIDE 12.5; 2 MG/1; MG/1
1 TABLET ORAL DAILY
Qty: 30 TABLET | Refills: 11 | Status: SHIPPED | OUTPATIENT
Start: 2025-05-15 | End: 2026-05-15

## 2025-05-15 RX ORDER — NITROGLYCERIN 0.4 MG/1
0.4 TABLET SUBLINGUAL EVERY 5 MIN PRN
Qty: 25 TABLET | Refills: 6 | Status: SHIPPED | OUTPATIENT
Start: 2025-05-15 | End: 2026-05-15

## 2025-05-15 RX ORDER — CARVEDILOL 12.5 MG/1
12.5 TABLET ORAL 2 TIMES DAILY WITH MEALS
Qty: 60 TABLET | Refills: 11 | Status: SHIPPED | OUTPATIENT
Start: 2025-05-15 | End: 2026-05-15

## 2025-05-15 NOTE — PATIENT INSTRUCTIONS
BINH of BLE  Follow up in Cardiology Clinic in 8 months or sooner if needed    Follow up with PCP as directed  Strict ED precautions

## 2025-05-15 NOTE — PROGRESS NOTES
CHIEF COMPLAINT:   Chief Complaint   Patient presents with    Follow-up     Denies any cardiac problems                Review of patient's allergies indicates:   Allergen Reactions    Penicillins      Other reaction(s): Passed out                                          HPI:  Doug Faye 69 y.o. male with a past medical history of Nonobstructive CAD per [facility] Aug. 2023, HLD, HTN, carotid artery stenosis (<50% to CHANDNI), obesity, ANGELITA/BiPAP, and GERD who presents to cardiology clinic for routine follow up and results of testing.  At his last clinic visit, the patient endorsed intermittent bilateral lower extremity edema.  He completed a subsequent bilateral venous insufficiency ultrasounds in December 2024 that revealed no evidence of DVT to bilateral lower extremities and GSV reflux of 1.5 seconds at the level of the knee in the left lower extremity. (See Report Below).  However, the patient opted to continue with conservative measures given minimal symptoms.    Latest Echocardiogram obtained on 12.28.23 revealed a preserved EF of 57% and mild tricuspid regurgitation.   Most recent ischemic evaluation includes a  Left Heart Catheterization on 8.21.23 due to an abnormal nuclear stress test that revealed nonobstructive CAD.  Notably, the patient underwent a previous [facility] on 3.15.21 that revealed nonobstructive CAD with ectatic RCA. (See reports below).    Today the patient reports that he feels well overall.  He continues to endorse occasional episodes of shortness of breath with over exertion but reportedly  is able to perform his ADLs and routine activities without experiencing any angina or angina equivalent symptoms.  Patient also does minor outside things including working on his 4 baeza without any significant shortness of breath.  Regarding his bilateral lower extremity, the patient notes symptoms that occurs of the end of the day but resolves with leg elevation.  He also endorses recent leg pain over the past  several months.  He denies any further cardiac complaints of chest pain, palpitations, orthopnea, PND, lightheadedness dizziness, near-syncope, syncope or falls.  He endorses nightly compliance with BiPAP and feels he is benefitting from use.      CARDIAC TESTING:  Venous US 12.16.24       There is no evidence of a right lower extremity DVT.    There is no evidence of a left lower extremity DVT.    The right superficial femoral middle vein is normal.    The contralateral (left) common femoral vein is patent.    The left superficial femoral middle vein is normal.    The contralateral (right) common femoral vein is patent.     There is GSV reflux of 1.5 seconds at the level of the knee in the left lower extremity.        ECHO 12.28.23    Left Ventricle: The left ventricle is normal in size. Normal wall thickness. There is normal systolic function. Biplane (2D) method of discs ejection fraction is 57%.    Right Ventricle: Normal right ventricular cavity size. Systolic function is normal.    Left Atrium: Left atrium is mildly dilated.    Right Atrium: Right atrium is mildly dilated.    Tricuspid Valve: There is mild regurgitation.    BINH of BLE 12.28.23  There are normal multiphasic Doppler waveforms at the right ankle.   The resting BINH on the right is normal at 1.24.  The post stress BINH on the right dropped mildly to 0.94.     There are normal multiphasic Doppler waveforms at the left ankle.   The resting BINH on the left is normal at 1.21.  The post stress BINH on the left dropped mildly to 1.01.    Carotid US 7.16.24  The right internal carotid artery demonstrated less than a 50% stenosis.  There is a minimal amount of mixed echogenicity plaque in the proximal ICA.  The right vertebral artery was patent with antegrade flow.     The left internal carotid artery demonstrated no hemodynamically significant stenosis.  There was no significant plaque identified.  The left vertebral artery was patent with antegrade  flow.        Cardiac Catheterization 8.21.23  Summary    The Prox RCA to Mid RCA lesion was 50% stenosed.    The RPDA lesion was 50% stenosed.    The pre-procedure left ventricular end diastolic pressure was 18.    The estimated blood loss was <50 mL.    There was non-obstructive coronary artery disease..     LVEDP:   18 mmHg     RECOMMENDATIONS  Medical management  Risk factor modifications     Nuclear Stress Test    Abnormal myocardial perfusion scan.    There is a moderate intensity, moderate sized, reversible perfusion abnormality that is consistent with ischemia in the basal to mid inferolateral wall(s) in the typical distribution of the RCA territory.    There are no other significant perfusion abnormalities.    The gated perfusion images showed an ejection fraction of 55% at rest. The gated perfusion images showed an ejection fraction of 63% post stress.    The patient reported no chest pain during the stress test.    There were no arrhythmias during stress.     On the nuclear stress test the EF is normal.  If the patient has an echo, the EF on the echo is considered more accurate.         The patient has a moderate risk nuclear stress test.      If clinically indicated, consider further evaluation with coronary angiogram.    PFT testing July 14, 2021:  Normal PFT    TTE 4.14.21  Left ventricular ejection fraction is measured approximately 50 to 55%  Structurally normal mitral valve  Structurally aortic valve is trileaflet  Tricuspid valve is structurally normal  There is mild tricuspid regurgitation with RVSP estimated at 34 mmHg  Pulmonic valve is grossly normal  Pulmonic regurgitation present  Mildly dilated left atrium  Left ventricular ejection fraction is measured approximately 50 to 55%  Mildly dilated right atrium  Normal right ventricular size with preserved RV function    Lexiscan stress test 2/25/2021:  Inferior and lateral ischemia  No scar    Echocardiogram January 24, 2017:  Normal left  ventricular cavity with overall normal systolic function, EF measured at approximately 59%    Echocardiogram April 25, 2016:  Normal left ventricular cavity with overall normal systolic function-assessed at 63%. Mild MR. Normal aortic valve. Mild TR. Trace pulmonic regurgitation is present. Mildly dilated left atrium. E/A flow reversal noted. Suggestive of diastolic dysfunction. Mildly enlarged right atrium size. Normal right ventricular size with preserved RV function. No evidence of significant pericardial effusion is noted.    Lexiscan 2/23/16  Impression:  Normal myocardial perfusion study. Negative for ischemia. Mildly  reduced LVEF of 46%       Patient Active Problem List   Diagnosis    Coronary artery disease involving native coronary artery of native heart without angina pectoris    Hyperlipidemia LDL goal <70    Primary hypertension    QUIROZ (dyspnea on exertion)    ANGELITA treated with BiPAP    PCO (posterior capsular opacification), right    Primary open angle glaucoma (POAG) of right eye, mild stage    Carpal tunnel syndrome, bilateral upper limbs    BMI 35.0-35.9,adult    Primary open angle glaucoma (POAG) of left eye, severe stage    Rotator cuff syndrome, right     Past Surgical History:   Procedure Laterality Date    ANGIOGRAM, CORONARY, WITH LEFT HEART CATHETERIZATION Left 8/21/2023    Procedure: Angiogram, Coronary, with Left Heart Cath;  Surgeon: Kishan De La Rosa MD;  Location: [facility] CATH LAB;  Service: Cardiology;  Laterality: Left;    APPENDECTOMY      blebectomy      COLONOSCOPY  07/25/2014    Dr Yariel Razo    HERNIA REPAIR       Social History     Socioeconomic History    Marital status:    Tobacco Use    Smoking status: Never    Smokeless tobacco: Never   Substance and Sexual Activity    Alcohol use: Not Currently    Drug use: Never     Social Drivers of Health     Financial Resource Strain: High Risk (11/8/2024)    Received from [facility] SDOH Screening     In the past  year, have you been unable to get any of the following when you really needed them? choose all that apply.: Medicine or health care     In the past year, have you been unable to get any of the following when you really needed them? choose all that apply.: Internet   Food Insecurity: High Risk (11/10/2024)    Received from [facility] SDOH Screening     In the past 2 months, did you or others you live with eat smaller meals or skip meals because you didn't have money for food?: Yes        Family History   Problem Relation Name Age of Onset    Hypertension Mother      Heart failure Father      Hypertension Father      Heart failure Sister      Hypertension Sister      Heart attack Sister      Stroke Brother       Current Outpatient Medications:     acetaZOLAMIDE (DIAMOX) 250 MG tablet, Take 2 tablets (500 mg total) by mouth 2 (two) times daily., Disp: 120 tablet, Rfl: 1    ANDROGEL 20.25 mg/1.25 gram (1.62 %) GlPm, , Disp: , Rfl:     aspirin (ECOTRIN) 81 MG EC tablet, Take 1 tablet (81 mg total) by mouth once daily., Disp: 90 tablet, Rfl: 3    atorvastatin (LIPITOR) 10 MG tablet, Take 1 tablet (10 mg total) by mouth once daily., Disp: 90 tablet, Rfl: 3    baclofen (LIORESAL) 20 MG tablet, Take 1 tablet every day by oral route at bedtime., Disp: , Rfl:     carvediloL (COREG) 12.5 MG tablet, Take 1 tablet (12.5 mg total) by mouth 2 (two) times daily with meals., Disp: 30 tablet, Rfl: 11    docusate sodium (COLACE) 100 MG capsule, 1 capsule as needed., Disp: , Rfl:     dorzolamide (TRUSOPT) 2 % ophthalmic solution, Place 1 drop into both eyes 3 (three) times daily., Disp: 10 mL, Rfl: 11    DULoxetine (CYMBALTA) 60 MG capsule, Take 60 mg by mouth once daily., Disp: , Rfl:     latanoprost 0.005 % ophthalmic solution, Place 1 drop into both eyes once daily., Disp: 2.5 mL, Rfl: 5    lisinopriL-hydrochlorothiazide (PRINZIDE,ZESTORETIC) 20-12.5 mg per tablet, Take 1 tablet by mouth once daily., Disp: 90 tablet,  "Rfl: 3    montelukast (SINGULAIR) 10 mg tablet, Take 1 tablet by mouth once daily., Disp: , Rfl:     naproxen (NAPROSYN) 500 MG tablet, Take 500 mg by mouth 2 (two) times daily., Disp: , Rfl:     nitroGLYCERIN (NITROSTAT) 0.4 MG SL tablet, Place 1 tablet (0.4 mg total) under the tongue every 5 (five) minutes as needed for Chest pain., Disp: 25 tablet, Rfl: 6    pantoprazole (PROTONIX) 40 MG tablet, Take 40 mg by mouth once daily., Disp: , Rfl:     tamsulosin (FLOMAX) 0.4 mg Cap, Take 1 capsule by mouth once daily., Disp: , Rfl:     traMADoL (ULTRAM) 50 mg tablet, Take 50 mg by mouth 2 (two) times daily as needed., Disp: , Rfl:     traZODone (DESYREL) 50 MG tablet, Take 50 mg by mouth nightly., Disp: , Rfl:     HYDROcodone-acetaminophen (NORCO)  mg per tablet, 1 tablet as needed. (Patient not taking: Reported on 5/20/2024), Disp: , Rfl:     sildenafiL (VIAGRA) 100 MG tablet, TAKE ONE TABLET BY MOUTH ONE HALF HOUR PRIOR TO SEX (Patient not taking: Reported on 5/15/2025), Disp: , Rfl:     Current Facility-Administered Medications:     diazePAM tablet 5 mg, 5 mg, Oral, On Call Procedure, Jose, Laterica L, FNP     ROS:                                                                                                                                                                             Review of Systems   Constitutional: Negative.    Eyes:         Visual impairment   Respiratory:  Positive for shortness of breath.    Cardiovascular:  Positive for claudication and leg swelling. Negative for chest pain and palpitations.   Gastrointestinal: Negative.    Musculoskeletal: Negative.    Skin: Negative.    Neurological: Negative.    Psychiatric/Behavioral: Negative.          Blood pressure 133/75, pulse 60, temperature 97.8 °F (36.6 °C), temperature source Oral, resp. rate 18, height 5' 11" (1.803 m), weight 111.6 kg (246 lb), SpO2 98%.     PE:  Physical Exam  Constitutional:       Appearance: Normal appearance. "   HENT:      Head: Normocephalic.   Eyes:      Extraocular Movements: Extraocular movements intact.   Cardiovascular:      Rate and Rhythm: Normal rate and regular rhythm.   Pulmonary:      Effort: Pulmonary effort is normal.   Abdominal:      Palpations: Abdomen is soft.   Musculoskeletal:         General: Normal range of motion.      Cervical back: Neck supple.   Skin:     General: Skin is warm and dry.   Neurological:      General: No focal deficit present.      Mental Status: He is alert and oriented to person, place, and time.   Psychiatric:         Mood and Affect: Mood normal.       ASSESSMENT/PLAN:  QUIROZ- stable   Nonobstructive CAD  - LHC- nonobstructive CAD (prox-mid RCA 50% stenosis, RPDA lesion was 50% stenosed) (8.21.23)  - LHC - nonobstructive CAD with ectatic RCA (3.15.21)  - Abnormal Stress Test- moderate intensity, moderate sized, reversible perfusion abnormality that is consistent with ischemia in the basal to mid inferolateral wall(s) in the typical distribution of the RCA territory.(7.24.23)   - EF- 57% and mild MR per ECHO 12.28.23  - EF - 50-55% per TTE 4.14.21  - Denies CP. Reports stable QUIROZ with over exertion.  ADLs unchanged.  - Continue Aspirin, Carvedilol 6.25 mg BID, Lisinopril/HCTZ 20/12.5 mg, Atorvastatin,and SL Nitro prn CP. Unable to tolerated Imdur due to dizziness  - Counseled on the importance of consuming a heart healthy diet and exercise as tolerated       HTN  - BP at goal 133/75  - Continue Carvedilol 6.25 mg BID, Lisinopril/HCTZ 20/12.5 mg  - Counseled on low salt diet and exercise as tolerated     HLD  - LDL -32- at goal, total chol-90, trig-184  - Continue Atorvastatin 10 mg (decreased dose) and fish oil  - Counseled on the importance of obtaining a healthy BMI, and consuming a heart healthy, low-cholesterol diet    GERD  - Management per PCP    ANGELITA/BiPAP  - Patient reports nightly compliance with BiPAP and feels he is benefitting from use   - Recommend continued nightly  BiPAP use      Chronic Back Pain  - Continue with pain management as directed    Obesity  - Encouraged weight loss through diet and increased activity as tolerated     Carotid Artery Stenosis   - Carotid US- demonstrated less than 50% stenosis to the right internal carotid artery and the left internal carotid artery demonstrated no hemodynamically significant stenosis (7.16.24)    Pain in legs   Venous insufficiency  - Venous Reflux Studies of BLE- GSV reflux of 1.5 seconds at the level of the knee in the left lower extremity.  Negative for DVT bilaterally (12.16.24)  - Reports intermittent lower extremity edema that occurs at the end of day.  Relieved with leg elevation.  No peripheral edema appreciated on exam.  - Advised patient on conservative measures including low-sodium diet, leg elevation and compression stockings.    - Will obtain BINH of BLE to assess for any significant arterial insufficiency given recent pain symptoms.  Previous ABIs in 2023 normal     BINH of BLE  Follow up in Cardiology Clinic in 8 months or sooner if needed    Follow up with PCP as directed

## 2025-05-19 DIAGNOSIS — H40.1111 PRIMARY OPEN ANGLE GLAUCOMA (POAG) OF RIGHT EYE, MILD STAGE: ICD-10-CM

## 2025-05-19 DIAGNOSIS — H40.1123 PRIMARY OPEN ANGLE GLAUCOMA (POAG) OF LEFT EYE, SEVERE STAGE: Primary | ICD-10-CM

## 2025-05-19 RX ORDER — DORZOLAMIDE HCL 20 MG/ML
1 SOLUTION/ DROPS OPHTHALMIC 3 TIMES DAILY
Qty: 10 ML | Refills: 11 | Status: SHIPPED | OUTPATIENT
Start: 2025-05-19 | End: 2026-05-19

## 2025-05-19 NOTE — TELEPHONE ENCOUNTER
----- Message from Mary sent at 5/19/2025 12:37 PM CDT -----  Regarding: refill  Pt called need a refill on his eye drop(orange drops). Pharmacy listed on file

## 2025-06-03 ENCOUNTER — TELEPHONE (OUTPATIENT)
Dept: OPHTHALMOLOGY | Facility: CLINIC | Age: 70
End: 2025-06-03
Payer: MEDICARE

## 2025-06-04 ENCOUNTER — CLINICAL SUPPORT (OUTPATIENT)
Dept: OPHTHALMOLOGY | Facility: CLINIC | Age: 70
End: 2025-06-04
Payer: MEDICARE

## 2025-06-04 VITALS — HEIGHT: 71 IN | WEIGHT: 246.06 LBS | BODY MASS INDEX: 34.45 KG/M2

## 2025-06-04 DIAGNOSIS — H40.1123 PRIMARY OPEN ANGLE GLAUCOMA (POAG) OF LEFT EYE, SEVERE STAGE: Primary | ICD-10-CM

## 2025-06-04 DIAGNOSIS — H26.491 PCO (POSTERIOR CAPSULAR OPACIFICATION), RIGHT: ICD-10-CM

## 2025-06-04 PROCEDURE — 66761 REVISION OF IRIS: CPT | Mod: PBBFAC,PN

## 2025-06-04 PROCEDURE — 99213 OFFICE O/P EST LOW 20 MIN: CPT | Mod: PBBFAC,PN

## 2025-06-04 RX ORDER — ACETAZOLAMIDE 250 MG/1
500 TABLET ORAL
Status: COMPLETED | OUTPATIENT
Start: 2025-06-04 | End: 2025-06-04

## 2025-06-04 RX ORDER — PREDNISOLONE ACETATE 10 MG/ML
1 SUSPENSION/ DROPS OPHTHALMIC 4 TIMES DAILY
Qty: 10 ML | Refills: 0 | Status: SHIPPED | OUTPATIENT
Start: 2025-06-04 | End: 2025-06-14

## 2025-06-04 RX ADMIN — ACETAZOLAMIDE 500 MG: 250 TABLET ORAL at 01:06

## 2025-06-05 ENCOUNTER — TELEPHONE (OUTPATIENT)
Dept: OPHTHALMOLOGY | Facility: CLINIC | Age: 70
End: 2025-06-05
Payer: MEDICARE

## 2025-06-13 ENCOUNTER — OFFICE VISIT (OUTPATIENT)
Dept: OPHTHALMOLOGY | Facility: CLINIC | Age: 70
End: 2025-06-13
Payer: MEDICARE

## 2025-06-13 VITALS — BODY MASS INDEX: 34.57 KG/M2 | WEIGHT: 246.94 LBS | HEIGHT: 71 IN

## 2025-06-13 DIAGNOSIS — Z98.890 POSTOPERATIVE EYE STATE: Primary | ICD-10-CM

## 2025-06-13 PROCEDURE — 99213 OFFICE O/P EST LOW 20 MIN: CPT | Mod: PBBFAC,PN

## 2025-06-13 NOTE — PROGRESS NOTES
HPI    RTC 1 week post LPI OD check    Episodes after getting up of distorted vision, kaleidoscope last a few   minutes, blurred vision for 15min or so  Last edited by Marge Menard, RN on 6/13/2025  8:13 AM.        Testing  Tmax 28//38  //565    OCT RNFL  3/2017: 77//55 ( S, I red, T borderline, N green)  1/2019 OD: 81 yellow S, otherwise green // unable OS  12/22/20  OD - 80, yellow S, remainder green  OS - poor quality, red S  7/11/2023: 84//15, all green//poor study  10/2023: all green // all red  4/11/24:  77, Red S, Rest green (4/10 SS)  7/19//24: 79, red S, rest green (shifted superior peak)  2/28/25: 73, red S, yellow I, remaining green  3/12/25: 71, red S yellow I - progression since [id number]    Gonio  9/29/2020  OD: Open to   OS: Open to SS/  12/2014 Open OU    HVF 24-2  2/18/15 -- Full OD/IAD OS  3/2017 Full OD/ IAD +progression OS  5/13/19 OD only: full and reliable  9/29/2020  OD: 1/10 losses, reliable, early superior arcuate defect  OS: 0/11 losses, total defect  12/23/21  OD: few scattered superior defects, grossly full   OS: generalized depression  1/11/24  OD: FL 0/11, FP 0%, FN 0%, nonspecific scattered defects, essentially full  OS: unable     B-scan OS 7/24/20 - flat and attached, no tears/detachments appreciated   B scan os 3/12/25: flat and attached, no tears/detachments    ASSESSMENT / PLAN:    1. Severe POAG OS  2. s/p CEIOL + AGV OS 10/29/2020  - s/p trab with multiple revisions, Due to persistent bleb leak with resultant hypotony with maculopathy s/p trab (11/18/17), trab revision x4 (2/21/18, 2/26/18, 4/3/18, 5/29/18)  - Tmax 38  - Intolerance: Timolol (arrhythmias), brimonidine (follicular reaction)  Drops: Dorzolamide TID, latanoprost qHS  - 10/23: IOP OK at 18, not using massage, pressure is adequate given poor vision potential  - 1/11/24: IOP stable at 18//14, using ocular massage sometimes; HVF full OD // unable OS; continue current regimen  - 4/11/24: IOP 16//13,  stable. Pt using ocular massage 0-1x/day  - 8/2/24: IOP 14, no pain. Continue drops.  - 12/3/24: IOP 13, stable. Continue current drops.  - 12/20/24: pt with bilateral injection and tr follicular reaction OU in palpebral conj, discussed with patient that this could be 2/2 pressure drops causing irritation. Consider changing drops at next general appointment due to possible irritation from drops    - 01/29/2025: patient with increased IOP after dilation on applanation and tonopen. Will have patient follow up in [facility] 02/28/2025 given severe glaucoma and on near max drops (cannot tolerate timolol, can start rocklatan?).   - 2/28/25: [facility] day: IOP 22 today, patient endorses good compliance. Discussed patient irritated eyes and higher IOP. Per Dr. Mills, irritation can cause increase in eye pressures. Switch from simbrinza patient to dorzolamide TID OU. Continue latanoprost qHS.  - 3/12/25: endorses compliance with drops. Continue current management. OCT RNFL unreadable, OCT mac with irregular findings concerning for SRF. DFE hazy but appears WNL, b scan flat. Continue current management.     3. Narrow angle glaucoma, at least mild stage OD  - Tmax 28   - Goal ~19  - CDR: 0.3  - Current drops: Dorzolamide TID, Latanoprost QHS  - Intolerance: Timolol (arrhythmias), brimonidine (follicular reaction)  - 2/28/25: IOP today 18. Some progression on OCT RNFL, will repeat OCT RNFL next visit  - 3/12/25: IOP 16 via tonopen at presentation. Post-dilation IOP 30. Confirmed with tonopen. Gonio open OD post dilation. OCT today with confirmed progression OD from 2023/2024. Given progression and elevated IOP post dilation, recommend coming back for SLT in 6-8 weeks.   - 5/7/2025: IOP 24 OD. Here for SLT today, however, prior to procedure gonio with Dr. Hager. Narrow angles. Pt would likely benefit from LPI rather than SLT. Pt is monocular and odes not have  today. Will start Diamox and bring back for LPI.   -  6/4/25: here for LPI OD, very thick iris stroma and higher energy beams with multiple shots put in for small area of transillumination. Of note, unknown reason why patient previously had open angles then became narrow especially with pseudophakic status.   - 6/13/25: 1 week s/p LPI, patient compliant with dorzolamide and latanoprost, using PF QID since last week. Patient reports headaches and seeing visual distortion since LPI, symptoms resolve after glaucoma gtts. IOP 27, AC d/q, PI barely patent (thick iris stroma during LPI) but does transilluminate. Stop PF and RTC 2-3 weeks for IOP check.   - Will need updated VF testing after LPI  - Continue Dorzolamide, Latanoprost  - Continue Diamox 500 mg BID    4. Dry Eye Syndrome vs Glaucoma Medicamentosa OS>OD  - PFATs 6 times a day, WC/LS. Start erythromycin jean OD (ok to also use OU) for dense confluent PEEs   - Excess glaucoma drops in right eye may be contributing factor  - Discussed punctal plugs vs restasis, pt interested in restasis if possible (has never tried). Told to increase PFATs to at least 6 times daily and continue jean.   - could trial SLT OD to lower IOP and reduce drop burden  - could switch to preservative free glaucoma drops  - 8/28/24: Not using WC or lid scrubs. Discussed proper technique. Recommend Sy mask and avenova scrubs along with PFAT 4-6x/day. If not improved with compliance, could try doxycycline.  - 2/28/25: States irritation has significantly reduced. Wipes his eyelids around 30 minutes after putting in a drop - states helps significantly. Using ATs ~4x a day. Switching patient from simbrinza to dorzolamide.  - 3/12/25: improving now off of brimonidine. Encouraged increased use of PFATs.    5. Ptosis OS  - Pt states that he only has noticed it since the surgery  - MRD1 0.5 OS  - doesn't bother pt    6. S/p CEIOL OD  - 7/26/23 yag cap, BCVA 20/20    7. Optic atrophy OS  - May be secondary to the hypotony in 2017/2018    8. PVD OD  9.  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 No flashes/curtains   - Monocular precautions   - 7/19/24: Seen with Dr Wilhelm - holes small with pigmentary changes surrounding. Would hold off on treatment at this time given location, unlikely to progress given small size and pigmentary changes.  - 12/3/24: Patient with 1 week history of new floaters. Large PVD noted OD with no new changes in peripheral examination. Will bring back to [facility] day for evaluation (does patient need barrier laser?)  -12/20/24: pt reports improving floaters in the right eye however, continues to experience occasional floaters. With essential monocular status of the right eye as seeing eye, with persistent floaters and low risk profile of peripheral laser, will schedule patient at next available procedure day for laser of right eye peripheral atrophic holes. Discussed and seen with Dr. Wilhelm, given peripheral location and pigmentary changes surrounding, continues to be low risk for detachment, however, after discussion with pt, will move forward for laser retinopexy surrounding holes.   - 01/29/2025: presents for laser retinopexy right eye. 3 pigmented atrophic holes seen in far periphery, prior fundus photo reviewed with Dr. Hager. Discussed r/b/a, patient consented for retinopexy right eye. Patient tolerated procedure today with no complications.   - 3/12/25: no subretinal fluid on exam. Laser scars did not take well, not visible on optos or DFE. For now, will plan to perform SLT as elevated IOP in monocular patient is the priority. Will discuss with Dr. Wilhelm prior to patient's next appt.     RTC 2-3wk IOP check

## 2025-07-01 ENCOUNTER — HOSPITAL ENCOUNTER (OUTPATIENT)
Dept: RADIOLOGY | Facility: HOSPITAL | Age: 70
Discharge: HOME | End: 2025-07-01
Attending: NURSE PRACTITIONER
Payer: MEDICARE

## 2025-07-01 DIAGNOSIS — I73.9 CLAUDICATION: ICD-10-CM

## 2025-07-01 PROCEDURE — 93925 LOWER EXTREMITY STUDY: CPT

## 2025-07-01 PROCEDURE — 93924 LWR XTR VASC STDY BILAT: CPT

## 2025-07-02 LAB
LEFT CFA PSV: 105 CM/S
LEFT DORSALIS PEDIS PSV: 28 CM/S
LEFT POPLITEAL PSV: 79 CM/S
LEFT POST TIBIAL SYS PSV: 57 CM/S
LEFT PROFUNDA SYS PSV: 77 CM/S
LEFT SUPER FEMORAL DIST SYS PSV: 77 CM/S
LEFT SUPER FEMORAL MID SYS PSV: 87 CM/S
LEFT SUPER FEMORAL PROX SYS PSV: 73 CM/S
OHS CV LEFT LOWER EXTREMITY ABI (NO CALC): 1.09
OHS CV RIGHT ABI LOWER EXTREMITY (NO CALC): 1.08
RIGHT CFA PSV: 86 CM/S
RIGHT DORSALIS PEDIS PSV: 43 CM/S
RIGHT POPLITEAL PSV: 65 CM/S
RIGHT POST TIBIAL SYS PSV: 68 CM/S
RIGHT PROFUNDA SYS PSV: 117 CM/S
RIGHT SUPER FEMORAL DIST SYS PSV: 69 CM/S
RIGHT SUPER FEMORAL MID SYS PSV: 97 CM/S
RIGHT SUPER FEMORAL PROX SYS PSV: 81 CM/S

## 2025-07-03 ENCOUNTER — RESULTS FOLLOW-UP (OUTPATIENT)
Dept: CARDIOLOGY | Facility: CLINIC | Age: 70
End: 2025-07-03

## 2025-07-03 ENCOUNTER — TELEPHONE (OUTPATIENT)
Dept: CARDIOLOGY | Facility: CLINIC | Age: 70
End: 2025-07-03
Payer: MEDICARE

## 2025-07-04 LAB
IMMEDIATE ARM BP: 142 MMHG
IMMEDIATE LEFT ABI: 1.07
IMMEDIATE LEFT TIBIAL: 152 MMHG
IMMEDIATE RIGHT ABI: 1.1
IMMEDIATE RIGHT TIBIAL: 156 MMHG
LEFT ABI: 1.09
LEFT ARM BP: 144 MMHG
LEFT DORSALIS PEDIS: 149 MMHG
LEFT POSTERIOR TIBIAL: 157 MMHG
RIGHT ABI: 1.08
RIGHT ARM BP: 136 MMHG
RIGHT DORSALIS PEDIS: 144 MMHG
RIGHT POSTERIOR TIBIAL: 156 MMHG

## 2025-07-07 RX ORDER — ACETAZOLAMIDE 250 MG/1
500 TABLET ORAL 2 TIMES DAILY
Qty: 120 TABLET | Refills: 1 | Status: SHIPPED | OUTPATIENT
Start: 2025-07-07

## 2025-07-30 DIAGNOSIS — G47.33 OSA (OBSTRUCTIVE SLEEP APNEA): Primary | ICD-10-CM

## 2025-07-31 DIAGNOSIS — N28.9 RENAL INSUFFICIENCY: Primary | ICD-10-CM

## 2025-08-12 DIAGNOSIS — N28.9 RENAL INSUFFICIENCY: Primary | ICD-10-CM

## 2025-08-13 ENCOUNTER — LAB VISIT (OUTPATIENT)
Dept: LAB | Facility: HOSPITAL | Age: 70
End: 2025-08-13
Attending: NURSE PRACTITIONER
Payer: MEDICARE

## 2025-08-13 DIAGNOSIS — N28.9 RENAL INSUFFICIENCY: ICD-10-CM

## 2025-08-13 LAB
ALBUMIN SERPL-MCNC: 3.3 G/DL (ref 3.4–4.8)
ALBUMIN/GLOB SERPL: 0.9 RATIO (ref 1.1–2)
ALP SERPL-CCNC: 55 UNIT/L (ref 40–150)
ALT SERPL-CCNC: 11 UNIT/L (ref 0–55)
ANION GAP SERPL CALC-SCNC: 7 MEQ/L
AST SERPL-CCNC: 16 UNIT/L (ref 11–45)
BACTERIA #/AREA URNS AUTO: ABNORMAL /HPF
BASOPHILS # BLD AUTO: 0.05 X10(3)/MCL
BASOPHILS NFR BLD AUTO: 0.6 %
BILIRUB SERPL-MCNC: 0.6 MG/DL
BILIRUB UR QL STRIP.AUTO: NEGATIVE
BUN SERPL-MCNC: 16.2 MG/DL (ref 8.4–25.7)
CALCIUM SERPL-MCNC: 8.8 MG/DL (ref 8.8–10)
CHLORIDE SERPL-SCNC: 113 MMOL/L (ref 98–107)
CLARITY UR: CLEAR
CO2 SERPL-SCNC: 23 MMOL/L (ref 23–31)
COLOR UR AUTO: YELLOW
CREAT SERPL-MCNC: 1.17 MG/DL (ref 0.72–1.25)
CREAT UR-MCNC: 105.9 MG/DL (ref 63–166)
CREAT/UREA NIT SERPL: 14
EOSINOPHIL # BLD AUTO: 0.35 X10(3)/MCL (ref 0–0.9)
EOSINOPHIL NFR BLD AUTO: 4.3 %
ERYTHROCYTE [DISTWIDTH] IN BLOOD BY AUTOMATED COUNT: 15.1 % (ref 11.5–17)
GFR SERPLBLD CREATININE-BSD FMLA CKD-EPI: >60 ML/MIN/1.73/M2
GLOBULIN SER-MCNC: 3.6 GM/DL (ref 2.4–3.5)
GLUCOSE SERPL-MCNC: 129 MG/DL (ref 82–115)
GLUCOSE UR QL STRIP: NORMAL
HCT VFR BLD AUTO: 42.4 % (ref 42–52)
HGB BLD-MCNC: 14 G/DL (ref 14–18)
HGB UR QL STRIP: NEGATIVE
HYALINE CASTS #/AREA URNS LPF: ABNORMAL /LPF
IMM GRANULOCYTES # BLD AUTO: 0.02 X10(3)/MCL (ref 0–0.04)
IMM GRANULOCYTES NFR BLD AUTO: 0.2 %
KETONES UR QL STRIP: NEGATIVE
LEUKOCYTE ESTERASE UR QL STRIP: NEGATIVE
LYMPHOCYTES # BLD AUTO: 2.18 X10(3)/MCL (ref 0.6–4.6)
LYMPHOCYTES NFR BLD AUTO: 26.5 %
MCH RBC QN AUTO: 29.3 PG (ref 27–31)
MCHC RBC AUTO-ENTMCNC: 33 G/DL (ref 33–36)
MCV RBC AUTO: 88.7 FL (ref 80–94)
MONOCYTES # BLD AUTO: 0.69 X10(3)/MCL (ref 0.1–1.3)
MONOCYTES NFR BLD AUTO: 8.4 %
MUCOUS THREADS URNS QL MICRO: ABNORMAL /LPF
NEUTROPHILS # BLD AUTO: 4.94 X10(3)/MCL (ref 2.1–9.2)
NEUTROPHILS NFR BLD AUTO: 60 %
NITRITE UR QL STRIP: NEGATIVE
NRBC BLD AUTO-RTO: 0 %
PH UR STRIP: 6 [PH]
PLATELET # BLD AUTO: 276 X10(3)/MCL (ref 130–400)
PMV BLD AUTO: 9.5 FL (ref 7.4–10.4)
POTASSIUM SERPL-SCNC: 3.8 MMOL/L (ref 3.5–5.1)
PROT SERPL-MCNC: 6.9 GM/DL (ref 5.8–7.6)
PROT UR QL STRIP: NEGATIVE
PROT UR STRIP-MCNC: <6.8 MG/DL
RBC # BLD AUTO: 4.78 X10(6)/MCL (ref 4.7–6.1)
RBC #/AREA URNS AUTO: ABNORMAL /HPF
SODIUM SERPL-SCNC: 143 MMOL/L (ref 136–145)
SP GR UR STRIP.AUTO: 1.02 (ref 1–1.03)
SQUAMOUS #/AREA URNS LPF: ABNORMAL /HPF
UROBILINOGEN UR STRIP-ACNC: NORMAL
WBC # BLD AUTO: 8.23 X10(3)/MCL (ref 4.5–11.5)
WBC #/AREA URNS AUTO: ABNORMAL /HPF

## 2025-08-13 PROCEDURE — 81001 URINALYSIS AUTO W/SCOPE: CPT

## 2025-08-13 PROCEDURE — 80053 COMPREHEN METABOLIC PANEL: CPT

## 2025-08-13 PROCEDURE — 36415 COLL VENOUS BLD VENIPUNCTURE: CPT

## 2025-08-13 PROCEDURE — 85025 COMPLETE CBC W/AUTO DIFF WBC: CPT

## 2025-08-13 PROCEDURE — 82570 ASSAY OF URINE CREATININE: CPT

## 2025-08-18 ENCOUNTER — OFFICE VISIT (OUTPATIENT)
Dept: NEPHROLOGY | Facility: CLINIC | Age: 70
End: 2025-08-18
Payer: MEDICARE

## 2025-08-18 VITALS
HEART RATE: 62 BPM | BODY MASS INDEX: 35.93 KG/M2 | OXYGEN SATURATION: 97 % | RESPIRATION RATE: 18 BRPM | DIASTOLIC BLOOD PRESSURE: 70 MMHG | TEMPERATURE: 98 F | HEIGHT: 70 IN | WEIGHT: 251 LBS | SYSTOLIC BLOOD PRESSURE: 104 MMHG

## 2025-08-18 DIAGNOSIS — I10 PRIMARY HYPERTENSION: ICD-10-CM

## 2025-08-18 DIAGNOSIS — N17.9 ACUTE KIDNEY INJURY: Primary | ICD-10-CM

## 2025-08-18 DIAGNOSIS — E66.01 MORBID (SEVERE) OBESITY DUE TO EXCESS CALORIES: ICD-10-CM

## 2025-08-18 PROCEDURE — 3074F SYST BP LT 130 MM HG: CPT | Mod: CPTII,,, | Performed by: NURSE PRACTITIONER

## 2025-08-18 PROCEDURE — 3288F FALL RISK ASSESSMENT DOCD: CPT | Mod: CPTII,,, | Performed by: NURSE PRACTITIONER

## 2025-08-18 PROCEDURE — 99215 OFFICE O/P EST HI 40 MIN: CPT | Mod: PBBFAC | Performed by: NURSE PRACTITIONER

## 2025-08-18 PROCEDURE — 3066F NEPHROPATHY DOC TX: CPT | Mod: CPTII,,, | Performed by: NURSE PRACTITIONER

## 2025-08-18 PROCEDURE — 1125F AMNT PAIN NOTED PAIN PRSNT: CPT | Mod: CPTII,,, | Performed by: NURSE PRACTITIONER

## 2025-08-18 PROCEDURE — 1159F MED LIST DOCD IN RCRD: CPT | Mod: CPTII,,, | Performed by: NURSE PRACTITIONER

## 2025-08-18 PROCEDURE — 3008F BODY MASS INDEX DOCD: CPT | Mod: CPTII,,, | Performed by: NURSE PRACTITIONER

## 2025-08-18 PROCEDURE — 4010F ACE/ARB THERAPY RXD/TAKEN: CPT | Mod: CPTII,,, | Performed by: NURSE PRACTITIONER

## 2025-08-18 PROCEDURE — 99204 OFFICE O/P NEW MOD 45 MIN: CPT | Mod: S$PBB,,, | Performed by: NURSE PRACTITIONER

## 2025-08-18 PROCEDURE — 3078F DIAST BP <80 MM HG: CPT | Mod: CPTII,,, | Performed by: NURSE PRACTITIONER

## 2025-08-18 PROCEDURE — 1101F PT FALLS ASSESS-DOCD LE1/YR: CPT | Mod: CPTII,,, | Performed by: NURSE PRACTITIONER

## 2025-08-18 RX ORDER — DICLOFENAC SODIUM 30 MG/G
2 GEL TOPICAL 3 TIMES DAILY
COMMUNITY
Start: 2025-05-05

## 2025-08-18 RX ORDER — BRINZOLAMIDE 10 MG/ML
1 SUSPENSION/ DROPS OPHTHALMIC 3 TIMES DAILY
COMMUNITY
Start: 2025-06-20

## 2025-08-18 RX ORDER — CIPROFLOXACIN HYDROCHLORIDE 3 MG/ML
2 SOLUTION/ DROPS OPHTHALMIC EVERY 6 HOURS
COMMUNITY

## 2025-08-18 RX ORDER — LISINOPRIL 10 MG/1
10 TABLET ORAL DAILY
Qty: 90 TABLET | Refills: 0 | Status: SHIPPED | OUTPATIENT
Start: 2025-08-18 | End: 2025-11-16

## 2025-09-04 RX ORDER — ACETAZOLAMIDE 250 MG/1
500 TABLET ORAL 2 TIMES DAILY
Qty: 120 TABLET | Refills: 2 | Status: SHIPPED | OUTPATIENT
Start: 2025-09-04 | End: 2025-12-03

## (undated) DEVICE — NDL BLUNT FILL 18G 1IN

## (undated) DEVICE — DRAPE RADIAL BRACHIAL 29X42IN

## (undated) DEVICE — COVER CIV-FLEX PROBE US 58IN

## (undated) DEVICE — CATH OPTITORQUE RADIAL 5FR

## (undated) DEVICE — HEMOSTAT VASC BAND REG 24CM

## (undated) DEVICE — OMNIPAQUE 350MG 150ML VIAL

## (undated) DEVICE — GUIDEWIRE EMERALD 3MM 175X5CM

## (undated) DEVICE — Device

## (undated) DEVICE — DRSNG POLYSKIN TRNSPAR 4X4.75

## (undated) DEVICE — INTRODUCER TRNSRADIAL 6F 10CM